# Patient Record
Sex: FEMALE | Race: WHITE | NOT HISPANIC OR LATINO | Employment: OTHER | URBAN - METROPOLITAN AREA
[De-identification: names, ages, dates, MRNs, and addresses within clinical notes are randomized per-mention and may not be internally consistent; named-entity substitution may affect disease eponyms.]

---

## 2017-02-15 ENCOUNTER — LAB VISIT (OUTPATIENT)
Dept: LAB | Facility: HOSPITAL | Age: 47
End: 2017-02-15
Attending: SURGERY
Payer: MEDICARE

## 2017-02-15 DIAGNOSIS — E46 PROTEIN CALORIE MALNUTRITION: ICD-10-CM

## 2017-02-15 DIAGNOSIS — E66.01 MORBID OBESITY DUE TO EXCESS CALORIES: ICD-10-CM

## 2017-02-15 LAB
ALBUMIN SERPL BCP-MCNC: 3.6 G/DL
ALP SERPL-CCNC: 91 U/L
ALT SERPL W/O P-5'-P-CCNC: 11 U/L
ANION GAP SERPL CALC-SCNC: 9 MMOL/L
AST SERPL-CCNC: 15 U/L
BASOPHILS # BLD AUTO: 0 K/UL
BASOPHILS NFR BLD: 0.5 %
BILIRUB SERPL-MCNC: 0.3 MG/DL
BUN SERPL-MCNC: 10 MG/DL
CALCIUM SERPL-MCNC: 9.2 MG/DL
CHLORIDE SERPL-SCNC: 105 MMOL/L
CHOLEST/HDLC SERPL: 4.5 {RATIO}
CO2 SERPL-SCNC: 27 MMOL/L
CREAT SERPL-MCNC: 0.7 MG/DL
DIFFERENTIAL METHOD: ABNORMAL
EOSINOPHIL # BLD AUTO: 0.1 K/UL
EOSINOPHIL NFR BLD: 1.7 %
ERYTHROCYTE [DISTWIDTH] IN BLOOD BY AUTOMATED COUNT: 18 %
EST. GFR  (AFRICAN AMERICAN): >60 ML/MIN/1.73 M^2
EST. GFR  (NON AFRICAN AMERICAN): >60 ML/MIN/1.73 M^2
GLUCOSE SERPL-MCNC: 68 MG/DL
HCT VFR BLD AUTO: 33.2 %
HDL/CHOLESTEROL RATIO: 22.1 %
HDLC SERPL-MCNC: 244 MG/DL
HDLC SERPL-MCNC: 54 MG/DL
HGB BLD-MCNC: 10.6 G/DL
IRON SERPL-MCNC: 39 UG/DL
LDLC SERPL CALC-MCNC: 168.2 MG/DL
LYMPHOCYTES # BLD AUTO: 1.8 K/UL
LYMPHOCYTES NFR BLD: 37.5 %
MCH RBC QN AUTO: 27.1 PG
MCHC RBC AUTO-ENTMCNC: 31.9 %
MCV RBC AUTO: 85 FL
MONOCYTES # BLD AUTO: 0.3 K/UL
MONOCYTES NFR BLD: 7 %
NEUTROPHILS # BLD AUTO: 2.5 K/UL
NEUTROPHILS NFR BLD: 53.3 %
NONHDLC SERPL-MCNC: 190 MG/DL
PLATELET # BLD AUTO: 308 K/UL
PMV BLD AUTO: 8.9 FL
POTASSIUM SERPL-SCNC: 3.9 MMOL/L
PROT SERPL-MCNC: 7.1 G/DL
RBC # BLD AUTO: 3.91 M/UL
SATURATED IRON: 8 %
SODIUM SERPL-SCNC: 141 MMOL/L
TOTAL IRON BINDING CAPACITY: 465 UG/DL
TRANSFERRIN SERPL-MCNC: 314 MG/DL
TRIGL SERPL-MCNC: 109 MG/DL
VIT B12 SERPL-MCNC: 357 PG/ML
WBC # BLD AUTO: 4.7 K/UL

## 2017-02-15 PROCEDURE — 36415 COLL VENOUS BLD VENIPUNCTURE: CPT

## 2017-02-15 PROCEDURE — 85025 COMPLETE CBC W/AUTO DIFF WBC: CPT

## 2017-02-15 PROCEDURE — 84425 ASSAY OF VITAMIN B-1: CPT

## 2017-02-15 PROCEDURE — 83540 ASSAY OF IRON: CPT

## 2017-02-15 PROCEDURE — 80061 LIPID PANEL: CPT

## 2017-02-15 PROCEDURE — 80053 COMPREHEN METABOLIC PANEL: CPT

## 2017-02-15 PROCEDURE — 82607 VITAMIN B-12: CPT

## 2017-02-16 ENCOUNTER — OFFICE VISIT (OUTPATIENT)
Dept: BARIATRICS | Facility: CLINIC | Age: 47
End: 2017-02-16
Payer: MEDICARE

## 2017-02-16 VITALS
HEIGHT: 65 IN | HEART RATE: 63 BPM | DIASTOLIC BLOOD PRESSURE: 86 MMHG | RESPIRATION RATE: 16 BRPM | BODY MASS INDEX: 31.52 KG/M2 | TEMPERATURE: 98 F | WEIGHT: 189.19 LBS | SYSTOLIC BLOOD PRESSURE: 131 MMHG

## 2017-02-16 DIAGNOSIS — E55.9 VITAMIN D DEFICIENCY: ICD-10-CM

## 2017-02-16 DIAGNOSIS — E78.5 HYPERLIPIDEMIA, UNSPECIFIED HYPERLIPIDEMIA TYPE: ICD-10-CM

## 2017-02-16 DIAGNOSIS — E46 PROTEIN CALORIE MALNUTRITION: ICD-10-CM

## 2017-02-16 DIAGNOSIS — E11.9 TYPE 2 DIABETES MELLITUS WITHOUT COMPLICATION, WITHOUT LONG-TERM CURRENT USE OF INSULIN: ICD-10-CM

## 2017-02-16 DIAGNOSIS — E66.01 MORBID OBESITY, UNSPECIFIED OBESITY TYPE: Primary | ICD-10-CM

## 2017-02-16 DIAGNOSIS — E66.01 MORBID OBESITY WITH BMI OF 40.0-44.9, ADULT: ICD-10-CM

## 2017-02-16 PROCEDURE — 3044F HG A1C LEVEL LT 7.0%: CPT | Mod: S$GLB,,, | Performed by: SURGERY

## 2017-02-16 PROCEDURE — 99213 OFFICE O/P EST LOW 20 MIN: CPT | Mod: S$GLB,,, | Performed by: SURGERY

## 2017-02-16 PROCEDURE — 3079F DIAST BP 80-89 MM HG: CPT | Mod: S$GLB,,, | Performed by: SURGERY

## 2017-02-16 PROCEDURE — 99999 PR PBB SHADOW E&M-EST. PATIENT-LVL III: CPT | Mod: PBBFAC,,, | Performed by: SURGERY

## 2017-02-16 PROCEDURE — 3075F SYST BP GE 130 - 139MM HG: CPT | Mod: S$GLB,,, | Performed by: SURGERY

## 2017-02-16 PROCEDURE — 2022F DILAT RTA XM EVC RTNOPTHY: CPT | Mod: S$GLB,,, | Performed by: SURGERY

## 2017-02-16 PROCEDURE — 3060F POS MICROALBUMINURIA REV: CPT | Mod: S$GLB,,, | Performed by: SURGERY

## 2017-02-16 RX ORDER — MULTIVITAMIN
1 TABLET ORAL DAILY
COMMUNITY

## 2017-02-16 RX ORDER — SIMVASTATIN 20 MG/1
20 TABLET, FILM COATED ORAL NIGHTLY
Qty: 90 TABLET | Refills: 3 | Status: SHIPPED | OUTPATIENT
Start: 2017-02-16 | End: 2017-11-07 | Stop reason: SDUPTHER

## 2017-02-16 RX ORDER — FERROUS SULFATE 325(65) MG
325 TABLET ORAL DAILY
Qty: 90 TABLET | Refills: 0 | Status: SHIPPED | OUTPATIENT
Start: 2017-02-16 | End: 2018-10-09

## 2017-02-16 NOTE — MR AVS SNAPSHOT
[facility]  [address]  [address]  [place] [address]  Phone: [phone]  Fax: [phone]                  Bessie Elliott   2017 8:15 AM   Office Visit    Description:  Female : 1970   Provider:  Peggy Jo MD   Department:  [place] [facility]           Reason for Visit     Obesity           Diagnoses this Visit        Comments    Morbid obesity, unspecified obesity type    -  Primary     Morbid obesity with BMI of 40.0-44.9, adult         Type 2 diabetes mellitus without complication, without long-term current use of insulin         Hyperlipidemia, unspecified hyperlipidemia type         Protein calorie malnutrition         Vitamin D deficiency                To Do List           Future Appointments        Provider Department Dept Phone    2017 8:20 AM LAB, [facility] [phone]    2017 9:15 AM MD [facility] [phone]      Goals (5 Years of Data)     None      Follow-Up and Disposition     Return in about 3 months (around 2017).    Follow-up and Disposition History       These Medications        Disp Refills Start End    simvastatin (ZOCOR) 20 MG tablet 90 tablet 3 2017    Take 1 tablet (20 mg total) by mouth every evening. - Oral    Pharmacy: [facility], [address] Ph #: [phone]       ferrous sulfate 325 mg (65 mg iron) Tab tablet 90 tablet 0 2017     Take 1 tablet (325 mg total) by mouth once daily. - Oral    Pharmacy: [facility], [address] Ph #: [phone]         [facility]     [facility] -  Assistance  Registered nurses in the [facility] provide clinical advisement, health education, appointment booking, and other advisory services.  Call for this free service at [phone].            "  Medications           Message regarding Medications     Verify the changes and/or additions to your medication regime listed below are the same as discussed with your clinician today.  If any of these changes or additions are incorrect, please notify your healthcare provider.        START taking these NEW medications        Refills    simvastatin (ZOCOR) 20 MG tablet 3    Sig: Take 1 tablet (20 mg total) by mouth every evening.    Class: Normal    Route: Oral    ferrous sulfate 325 mg (65 mg iron) Tab tablet 0    Sig: Take 1 tablet (325 mg total) by mouth once daily.    Class: Normal    Route: Oral      STOP taking these medications     metronidazole (FLAGYL) 500 MG tablet TAKE ONE TABLET BY MOUTH EVERY 6 HOURS FOR 10 DAYS           Verify that the below list of medications is an accurate representation of the medications you are currently taking.  If none reported, the list may be blank. If incorrect, please contact your healthcare provider. Carry this list with you in case of emergency.           Current Medications     ALBUTEROL SULFATE (VENTOLIN HFA INHL) Inhale into the lungs.    alprazolam (XANAX) 1 MG tablet     aripiprazole (ABILIFY) 20 MG Tab Take 5 mg by mouth once daily.    BD INSULIN PEN NEEDLE UF MINI 31 gauge x 3/16" Ndle     dexlansoprazole (DEXILANT) 60 mg capsule Take 60 mg by mouth once daily.    lamotrigine (LAMICTAL) 150 MG Tab Take 300 mg by mouth every evening.     methocarbamol (ROBAXIN) 500 MG Tab     multivitamin (THERAGRAN) per tablet Take 1 tablet by mouth once daily.    ondansetron (ZOFRAN-ODT) 4 MG TbDL Take 1 tablet (4 mg total) by mouth every 6 (six) hours as needed.    promethazine (PHENERGAN) 25 MG tablet Take 25 mg by mouth every 4 (four) hours.    sertraline (ZOLOFT) 100 MG tablet Take 150 mg by mouth every evening.     sucralfate (CARAFATE) 100 mg/mL suspension Take 10 mLs (1 g total) by mouth 4 (four) times daily.    VICTOZA 3-JOHN 0.6 mg/0.1 mL (18 mg/3 mL) PnIj     zolpidem " "(AMBIEN) 10 mg Tab Take 5 mg by mouth nightly as needed.    ferrous sulfate 325 mg (65 mg iron) Tab tablet Take 1 tablet (325 mg total) by mouth once daily.    simvastatin (ZOCOR) 20 MG tablet Take 1 tablet (20 mg total) by mouth every evening.    ursodiol (ACTIGALL) 300 mg capsule            Clinical Reference Information           Your Vitals Were     BP Pulse Temp Resp Height Weight    131/86 63 98 °F (36.7 °C) (Oral) 16 5' 5" (1.651 m) 85.8 kg (189 lb 3.2 oz)    BMI                31.48 kg/m2          Blood Pressure          Most Recent Value    BP  131/86      Allergies as of 2/16/2017     Ace Inhibitors      Immunizations Administered on Date of Encounter - 2/16/2017     None      Orders Placed During Today's Visit     Future Labs/Procedures Expected by Expires    B12  2/16/2017 4/17/2018    B1  2/16/2017 4/17/2018    CBC w/ Auto Differential  2/16/2017 4/17/2018    CMP  2/16/2017 4/17/2018    Iron Studies  2/16/2017 4/17/2018    Lipid Panel  2/16/2017 4/17/2018    Vitamin D 25 Hydroxy  2/16/2017 4/17/2018      Language Assistance Services     ATTENTION: Language assistance services are available, free of charge. Please call [phone].      ATENCIÓN: Si habla jacob, tiene a zhong disposición servicios gratuitos de asistencia lingüística. Llame al [phone].     CHÚ Ý: N?u b?n nói Ti?ng Vi?t, có các d?ch v? h? tr? ngôn ng? mi?n phí dành cho b?n. G?i s? [phone].         [facility] complies with applicable Federal civil rights laws and does not discriminate on the basis of race, color, national origin, age, disability, or sex.        "

## 2017-02-16 NOTE — PROGRESS NOTES
Post Op Note    Surgery: gastric bypass surgery  Date: 5/16/16  Initial weight: 248  Last weight: 199  Current weight: 189    Constipation: none  Reflux: having some heartburn  Vomiting: milk and hot dogs cause vomiting    Diet:  Breakfast: skip sometimes, egg boiled or scrambled  Drinking plenty of water  Lunch: protein shake or deli meat  Dinner: baked or broiled meat with mixed vegetables  Snack: piece of cake once  Protein shake: permier    Still staying away from sodas    Exercise:  Bought a bicycle to use but limited by knee    MVI: bariatric vitamin, 1 mvi, calcium, b12    Vitals:    02/16/17 0825   BP: 131/86   Pulse: 63   Resp: 16   Temp: 98 °F (36.7 °C)       Body comp:  Fat Percent:  43.1 %  Fat Mass:  81.6 lb  FFM:  107.6 lb  TBW: 77 lb  TBW %:  40.7 %  BMR: 1506 kcal    PE:  NAD  RRR  Soft/nt/nd  Incisions: well healed    Labs: high cholesterol    A/P: s/p gastric bypass    Go back to see Dr. Clarissa German for ongoing knee pain after replacement  Restart cholesterol medicine   Restart iron    Counseling for patient:    Diet: avoid sodas still; stay away from carbs  Exercise: start exercising 20 minutes 3 x week  Vitamins: continue regimen - add iron    Co morbidities:     1. Morbid obesity, unspecified obesity type  CBC w/ Auto Differential    CMP    B1    Lipid Panel   2. Morbid obesity with BMI of 40.0-44.9, adult     3. Type 2 diabetes mellitus without complication, without long-term current use of insulin     4. Hyperlipidemia, unspecified hyperlipidemia type     5. Protein calorie malnutrition  B12    Iron Studies   6. Vitamin D deficiency  Vitamin D 25 Hydroxy       : I met with the patient for 15 minutes and counseled her for over 50% of that time

## 2017-02-17 LAB — VIT B1 SERPL-MCNC: 40 UG/L (ref 38–122)

## 2017-04-06 ENCOUNTER — PATIENT MESSAGE (OUTPATIENT)
Dept: SURGERY | Facility: CLINIC | Age: 47
End: 2017-04-06

## 2017-05-16 ENCOUNTER — OFFICE VISIT (OUTPATIENT)
Dept: BARIATRICS | Facility: CLINIC | Age: 47
End: 2017-05-16
Payer: MEDICARE

## 2017-05-16 VITALS
DIASTOLIC BLOOD PRESSURE: 71 MMHG | WEIGHT: 168 LBS | BODY MASS INDEX: 27.99 KG/M2 | SYSTOLIC BLOOD PRESSURE: 123 MMHG | RESPIRATION RATE: 16 BRPM | HEART RATE: 71 BPM | HEIGHT: 65 IN | TEMPERATURE: 98 F

## 2017-05-16 DIAGNOSIS — E78.5 HYPERLIPIDEMIA, UNSPECIFIED HYPERLIPIDEMIA TYPE: ICD-10-CM

## 2017-05-16 DIAGNOSIS — E11.9 TYPE 2 DIABETES MELLITUS WITHOUT COMPLICATION, WITHOUT LONG-TERM CURRENT USE OF INSULIN: ICD-10-CM

## 2017-05-16 DIAGNOSIS — E55.9 VITAMIN D DEFICIENCY: ICD-10-CM

## 2017-05-16 DIAGNOSIS — E53.8 B12 DEFICIENCY: ICD-10-CM

## 2017-05-16 DIAGNOSIS — E53.8 FOLATE DEFICIENCY: ICD-10-CM

## 2017-05-16 DIAGNOSIS — E51.9 THIAMINE DEFICIENCY: ICD-10-CM

## 2017-05-16 DIAGNOSIS — E66.01 MORBID OBESITY, UNSPECIFIED OBESITY TYPE: Primary | ICD-10-CM

## 2017-05-16 DIAGNOSIS — L98.7 LOOSE SKIN: ICD-10-CM

## 2017-05-16 DIAGNOSIS — E66.01 MORBID OBESITY WITH BMI OF 40.0-44.9, ADULT: ICD-10-CM

## 2017-05-16 DIAGNOSIS — E61.1 IRON DEFICIENCY: ICD-10-CM

## 2017-05-16 PROCEDURE — 99213 OFFICE O/P EST LOW 20 MIN: CPT | Mod: S$GLB,,, | Performed by: SURGERY

## 2017-05-16 PROCEDURE — 99999 PR PBB SHADOW E&M-EST. PATIENT-LVL III: CPT | Mod: PBBFAC,,, | Performed by: SURGERY

## 2017-05-16 PROCEDURE — 3044F HG A1C LEVEL LT 7.0%: CPT | Mod: S$GLB,,, | Performed by: SURGERY

## 2017-05-16 PROCEDURE — 3074F SYST BP LT 130 MM HG: CPT | Mod: S$GLB,,, | Performed by: SURGERY

## 2017-05-16 PROCEDURE — 3060F POS MICROALBUMINURIA REV: CPT | Mod: 8P,S$GLB,, | Performed by: SURGERY

## 2017-05-16 PROCEDURE — 3078F DIAST BP <80 MM HG: CPT | Mod: S$GLB,,, | Performed by: SURGERY

## 2017-05-16 PROCEDURE — 1160F RVW MEDS BY RX/DR IN RCRD: CPT | Mod: S$GLB,,, | Performed by: SURGERY

## 2017-05-16 RX ORDER — ALBUTEROL SULFATE 0.83 MG/ML
2.5 SOLUTION RESPIRATORY (INHALATION) EVERY 6 HOURS PRN
COMMUNITY

## 2017-05-16 RX ORDER — SUMATRIPTAN SUCCINATE 100 MG/1
TABLET ORAL
Refills: 6 | COMMUNITY
Start: 2017-04-03 | End: 2020-12-10 | Stop reason: RX

## 2017-05-16 RX ORDER — HYDROCODONE BITARTRATE AND ACETAMINOPHEN 10; 325 MG/1; MG/1
1 TABLET ORAL 2 TIMES DAILY PRN
Refills: 0 | Status: ON HOLD | COMMUNITY
Start: 2017-05-03 | End: 2017-08-16

## 2017-05-16 RX ORDER — EPINASTINE HYDROCHLORIDE 0.5 MG/ML
1 SOLUTION/ DROPS OPHTHALMIC 2 TIMES DAILY
Refills: 99 | COMMUNITY
Start: 2017-04-03

## 2017-05-16 RX ORDER — TOPIRAMATE 200 MG/1
300 TABLET ORAL 2 TIMES DAILY
COMMUNITY
Start: 2015-09-29 | End: 2020-12-10

## 2017-05-16 RX ORDER — FLUTICASONE PROPIONATE AND SALMETEROL 250; 50 UG/1; UG/1
1 POWDER RESPIRATORY (INHALATION) EVERY MORNING
COMMUNITY
End: 2020-12-10

## 2017-05-16 NOTE — PROGRESS NOTES
Post Op Note    Surgery: gastric bypass surgery  Date: 5/16/16  Initial weight: 248  Last weight: 189  Current weight: 168    Constipation: stool softener, stopped iron pills  Reflux: none  Vomiting: none    Diet:  Breakfast:  Protein shake  Lunch: may do another protein shake, sometimes scrambled egg  Dinner: steamed vegetables with a small meat- broiled hamburger alex  Snack: none  Protein shake: premier    Exercise:  Walking 2-3 times per week 15 minutes  Active throughout day, mows lawn, walking stairs    MVI: 3 mvi daily, b12, calcium with vitamin D, stopped iron pill    Vitals:    05/16/17 0902   BP: 123/71   Pulse: 71   Resp: 16   Temp: 97.8 °F (36.6 °C)       Body comp:  Fat Percent:  39.3 %  Fat Mass:  66 lb  FFM:  102 lb  TBW: 72 lb  TBW %:  42.9 %  BMR: 1413 kcal    PE:  NAD  RRR  Soft/nt/nd  Incisions: well healed    Labs: none    A/P: s/p kam en y gastric bypass     Counseling for patient:    Diet: We talked about ok for healthy eating which includes healthy carbs like bread and some vegetables not on our list.  Still avoid high sugar food products, soda should be a never as well as other sweets avoid pretzels.  Exercise: keep up your exercise routine, as time progresses start increasing duration, frequency, or intensity   Vitamins: will wait on labs to see if we can decrease multivitamin    Co morbidities:     1. Morbid obesity, unspecified obesity type  BMP    CBC w/ Auto Differential    Hepatic Panel    Magnesium    Phosphorus   2. Morbid obesity with BMI of 40.0-44.9, adult     3. Type 2 diabetes mellitus without complication, without long-term current use of insulin  Hg A1c   4. Hyperlipidemia, unspecified hyperlipidemia type  Lipid Profile   5. Vitamin D deficiency  Vitamin D 25 Hydroxy   6. B12 deficiency  Vitamin B12   7. Thiamine deficiency  Vitamin B1   8. Folate deficiency  Folate Serum   9. Loose skin  Ambulatory Referral to Plastic Surgery       : I met with the patient for 15 minutes  and counseled her for over 50% of that time

## 2017-05-16 NOTE — MR AVS SNAPSHOT
[facility]   [address], [address]  [place] [address]  Phone: [phone]  Fax: [phone]                  Bessie Elliott   2017 9:15 AM   Office Visit    Description:  Female : 1970   Provider:  Peggy Jo MD   Department:  [facility]           Reason for Visit     Follow-up           Diagnoses this Visit        Comments    Morbid obesity, unspecified obesity type    -  Primary     Morbid obesity with BMI of 40.0-44.9, adult         Type 2 diabetes mellitus without complication, without long-term current use of insulin         Hyperlipidemia, unspecified hyperlipidemia type         Vitamin D deficiency         B12 deficiency         Thiamine deficiency         Folate deficiency         Loose skin         Iron deficiency                To Do List           Goals (5 Years of Data)     None      Follow-Up and Disposition     Return in about 1 year (around 2018).    Follow-up and Disposition History      [facility]     [facility] Nurse Care Line -  Assistance  Unless otherwise directed by your provider, please contact [facility], our nurse care line that is available for  assistance.     Registered nurses in the [facility] provide: appointment scheduling, clinical advisement, health education, and other advisory services.  Call: [phone] (toll free)               Medications           Message regarding Medications     Verify the changes and/or additions to your medication regime listed below are the same as discussed with your clinician today.  If any of these changes or additions are incorrect, please notify your healthcare provider.             Verify that the below list of medications is an accurate representation of the medications you are currently taking.  If none reported, the list may be blank. If incorrect, please contact your healthcare provider. Carry this list with you in case of emergency.     "       Current Medications     alprazolam (XANAX) 1 MG tablet     aripiprazole (ABILIFY) 20 MG Tab Take 5 mg by mouth once daily.    BD INSULIN PEN NEEDLE UF MINI 31 gauge x 3/16" Ndle     dexlansoprazole (DEXILANT) 60 mg capsule Take 60 mg by mouth once daily.    epinastine 0.05 % ophthalmic solution Place 1 drop into both eyes 2 (two) times daily.    ferrous sulfate 325 mg (65 mg iron) Tab tablet Take 1 tablet (325 mg total) by mouth once daily.    hydrocodone-acetaminophen 10-325mg (NORCO)  mg Tab Take 1 tablet by mouth 2 (two) times daily as needed.    lamotrigine (LAMICTAL) 150 MG Tab Take 300 mg by mouth every evening.     methocarbamol (ROBAXIN) 500 MG Tab     multivitamin (THERAGRAN) per tablet Take 1 tablet by mouth once daily.    sertraline (ZOLOFT) 100 MG tablet Take 150 mg by mouth every evening.     simvastatin (ZOCOR) 20 MG tablet Take 1 tablet (20 mg total) by mouth every evening.    sucralfate (CARAFATE) 100 mg/mL suspension Take 10 mLs (1 g total) by mouth 4 (four) times daily.    topiramate (TOPAMAX) 200 MG Tab Take 200 mg by mouth.    VICTOZA 3-JOHN 0.6 mg/0.1 mL (18 mg/3 mL) PnIj     zolpidem (AMBIEN) 10 mg Tab Take 5 mg by mouth nightly as needed.    albuterol (PROVENTIL) 2.5 mg /3 mL (0.083 %) nebulizer solution 2.5 mg.    ALBUTEROL SULFATE (VENTOLIN HFA INHL) Inhale into the lungs.    fluticasone-salmeterol 250-50 mcg/dose (ADVAIR) 250-50 mcg/dose diskus inhaler Inhale 1 puff into the lungs.    ondansetron (ZOFRAN-ODT) 4 MG TbDL Take 1 tablet (4 mg total) by mouth every 6 (six) hours as needed.    promethazine (PHENERGAN) 25 MG tablet Take 25 mg by mouth every 4 (four) hours.    sumatriptan (IMITREX) 100 MG tablet TAKE ONE TABLET BY MOUTH EVERY DAY AS DIRECTED. may REPEAT one dose in 2 HOURS if no relief. may start with ONE-HALF TABLET    ursodiol (ACTIGALL) 300 mg capsule            Clinical Reference Information           Your Vitals Were     BP Pulse Temp Resp Height Weight    123/71 " "71 97.8 °F (36.6 °C) (Oral) 16 5' 5" (1.651 m) 76.2 kg (168 lb)    BMI                27.96 kg/m2          Blood Pressure          Most Recent Value    BP  123/71      Allergies as of 5/16/2017     Ace Inhibitors      Immunizations Administered on Date of Encounter - 5/16/2017     None      Orders Placed During Today's Visit      Normal Orders This Visit    Ambulatory Referral to Plastic Surgery     Future Labs/Procedures Expected by Expires    BMP  5/16/2017 7/15/2018    CBC w/ Auto Differential  5/16/2017 7/15/2018    Folate Serum  5/16/2017 7/15/2018    Hepatic Panel  5/16/2017 7/15/2018    Hg A1c  5/16/2017 7/15/2018    Iron & TIBC  5/16/2017 7/15/2018    Lipid Profile  5/16/2017 7/15/2018    Magnesium  5/16/2017 7/15/2018    Phosphorus  5/16/2017 7/15/2018    Vitamin B12  5/16/2017 7/15/2018    Vitamin B1  5/16/2017 7/15/2018    Vitamin D 25 Hydroxy  5/16/2017 7/15/2018      Language Assistance Services     ATTENTION: Language assistance services are available, free of charge. Please call [phone].      ATENCIÓN: Si habla jeffañol, tiene a zhong disposición servicios gratuitos de asistencia lingüística. Llame al [phone].     CHÚ Ý: N?u b?n nói Ti?ng Vi?t, có các d?ch v? h? tr? ngôn ng? mi?n phí dành cho b?n. G?i s? [phone].         [facility] complies with applicable Federal civil rights laws and does not discriminate on the basis of race, color, national origin, age, disability, or sex.        "

## 2017-07-27 ENCOUNTER — HOSPITAL ENCOUNTER (EMERGENCY)
Facility: HOSPITAL | Age: 47
Discharge: HOME | End: 2017-07-27
Attending: EMERGENCY MEDICINE
Payer: MEDICARE

## 2017-07-27 VITALS
OXYGEN SATURATION: 100 % | WEIGHT: 160 LBS | HEART RATE: 61 BPM | RESPIRATION RATE: 16 BRPM | BODY MASS INDEX: 26.63 KG/M2 | DIASTOLIC BLOOD PRESSURE: 70 MMHG | TEMPERATURE: 97 F | SYSTOLIC BLOOD PRESSURE: 129 MMHG

## 2017-07-27 DIAGNOSIS — R53.83 FATIGUE, UNSPECIFIED TYPE: Primary | ICD-10-CM

## 2017-07-27 LAB
ALBUMIN SERPL BCP-MCNC: 3.7 G/DL
ALP SERPL-CCNC: 96 U/L
ALT SERPL W/O P-5'-P-CCNC: 11 U/L
ANION GAP SERPL CALC-SCNC: 9 MMOL/L
AST SERPL-CCNC: 14 U/L
BASOPHILS # BLD AUTO: 0 K/UL
BASOPHILS NFR BLD: 0.6 %
BILIRUB SERPL-MCNC: 0.3 MG/DL
BUN SERPL-MCNC: 14 MG/DL
CALCIUM SERPL-MCNC: 8.8 MG/DL
CHLORIDE SERPL-SCNC: 109 MMOL/L
CO2 SERPL-SCNC: 21 MMOL/L
CREAT SERPL-MCNC: 0.9 MG/DL
DIFFERENTIAL METHOD: ABNORMAL
EOSINOPHIL # BLD AUTO: 0.1 K/UL
EOSINOPHIL NFR BLD: 1.1 %
ERYTHROCYTE [DISTWIDTH] IN BLOOD BY AUTOMATED COUNT: 18.1 %
EST. GFR  (AFRICAN AMERICAN): >60 ML/MIN/1.73 M^2
EST. GFR  (NON AFRICAN AMERICAN): >60 ML/MIN/1.73 M^2
GLUCOSE SERPL-MCNC: 86 MG/DL
HCT VFR BLD AUTO: 32.8 %
HGB BLD-MCNC: 10.6 G/DL
LYMPHOCYTES # BLD AUTO: 2.3 K/UL
LYMPHOCYTES NFR BLD: 37 %
MAGNESIUM SERPL-MCNC: 2.2 MG/DL
MCH RBC QN AUTO: 27.6 PG
MCHC RBC AUTO-ENTMCNC: 32.4 G/DL
MCV RBC AUTO: 85 FL
MONOCYTES # BLD AUTO: 0.5 K/UL
MONOCYTES NFR BLD: 8.7 %
NEUTROPHILS # BLD AUTO: 3.2 K/UL
NEUTROPHILS NFR BLD: 52.6 %
PHOSPHATE SERPL-MCNC: 4 MG/DL
PLATELET # BLD AUTO: 260 K/UL
PMV BLD AUTO: 8.3 FL
POTASSIUM SERPL-SCNC: 3.4 MMOL/L
PROT SERPL-MCNC: 7.1 G/DL
RBC # BLD AUTO: 3.85 M/UL
SODIUM SERPL-SCNC: 139 MMOL/L
TSH SERPL DL<=0.005 MIU/L-ACNC: 1.97 UIU/ML
WBC # BLD AUTO: 6.1 K/UL

## 2017-07-27 PROCEDURE — 36415 COLL VENOUS BLD VENIPUNCTURE: CPT

## 2017-07-27 PROCEDURE — 80053 COMPREHEN METABOLIC PANEL: CPT

## 2017-07-27 PROCEDURE — 83735 ASSAY OF MAGNESIUM: CPT

## 2017-07-27 PROCEDURE — 85025 COMPLETE CBC W/AUTO DIFF WBC: CPT

## 2017-07-27 PROCEDURE — 99284 EMERGENCY DEPT VISIT MOD MDM: CPT

## 2017-07-27 PROCEDURE — 84100 ASSAY OF PHOSPHORUS: CPT

## 2017-07-27 PROCEDURE — 84443 ASSAY THYROID STIM HORMONE: CPT

## 2017-07-27 PROCEDURE — 93005 ELECTROCARDIOGRAM TRACING: CPT

## 2017-07-27 PROCEDURE — 25000003 PHARM REV CODE 250: Performed by: EMERGENCY MEDICINE

## 2017-07-27 RX ORDER — POTASSIUM CHLORIDE 20 MEQ/1
20 TABLET, EXTENDED RELEASE ORAL
Status: COMPLETED | OUTPATIENT
Start: 2017-07-27 | End: 2017-07-27

## 2017-07-27 RX ORDER — ACETAMINOPHEN 325 MG/1
650 TABLET ORAL
Status: COMPLETED | OUTPATIENT
Start: 2017-07-27 | End: 2017-07-27

## 2017-07-27 RX ADMIN — POTASSIUM CHLORIDE 20 MEQ: 1500 TABLET, EXTENDED RELEASE ORAL at 10:07

## 2017-07-27 RX ADMIN — ACETAMINOPHEN 650 MG: 325 TABLET, FILM COATED ORAL at 09:07

## 2017-07-27 NOTE — ED PROVIDER NOTES
"Chief complaint:  Fatigue (cant stay awake)       HPI:  Bessie Elliott is a 46 y.o. female with hx of GBP, prior hysterectomy, anemia presenting with feeling of fatigue.  The patient has a history of chronic, unchanged migraine headaches.  She denies confusion, visual changes, numbness, weakness.  She has been compliant with diet.  The patient simply states that she easily falls asleep during the day despite adequate sleep at night.    ROS: As per HPI and below:  Positive for chronic, unchanged headaches.  No visual changes, numbness, weakness, chest pain, syncope, dyspnea him a cough, rhinorrhea, urinary complaints such as dysuria, oliguria, fever, abdominal pain, vomiting, diarrhea, blood in the stools, rashes, swelling.    Review of patient's allergies indicates:   Allergen Reactions    Ace inhibitors Other (See Comments)     Cough       Discharge Medication List as of 7/27/2017 10:05 AM      CONTINUE these medications which have NOT CHANGED    Details   albuterol (PROVENTIL) 2.5 mg /3 mL (0.083 %) nebulizer solution 2.5 mg., Until Discontinued, Historical Med      ALBUTEROL SULFATE (VENTOLIN HFA INHL) Inhale into the lungs., Until Discontinued, Historical Med      alprazolam (XANAX) 1 MG tablet Starting 5/25/2016, Until Discontinued, Historical Med      aripiprazole (ABILIFY) 20 MG Tab Take 5 mg by mouth once daily., Until Discontinued, Historical Med      BD INSULIN PEN NEEDLE UF MINI 31 gauge x 3/16" Ndle Starting 2/10/2016, Until Discontinued, Historical Med      dexlansoprazole (DEXILANT) 60 mg capsule Take 60 mg by mouth once daily., Until Discontinued, Historical Med      epinastine 0.05 % ophthalmic solution Place 1 drop into both eyes 2 (two) times daily., Starting 4/3/2017, Until Discontinued, Historical Med      ferrous sulfate 325 mg (65 mg iron) Tab tablet Take 1 tablet (325 mg total) by mouth once daily., Starting 2/16/2017, Until Discontinued, Normal      fluticasone-salmeterol 250-50 mcg/dose " (ADVAIR) 250-50 mcg/dose diskus inhaler Inhale 1 puff into the lungs., Until Discontinued, Historical Med      hydrocodone-acetaminophen 10-325mg (NORCO)  mg Tab Take 1 tablet by mouth 2 (two) times daily as needed., Starting 5/3/2017, Until Discontinued, Historical Med      lamotrigine (LAMICTAL) 150 MG Tab Take 300 mg by mouth every evening. , Starting 3/28/2016, Until Discontinued, Historical Med      methocarbamol (ROBAXIN) 500 MG Tab Starting 11/3/2016, Until Discontinued, Historical Med      multivitamin (THERAGRAN) per tablet Take 1 tablet by mouth once daily., Until Discontinued, Historical Med      ondansetron (ZOFRAN-ODT) 4 MG TbDL Take 1 tablet (4 mg total) by mouth every 6 (six) hours as needed., Starting 12/8/2016, Until Discontinued, Normal      promethazine (PHENERGAN) 25 MG tablet Take 25 mg by mouth every 4 (four) hours., Until Discontinued, Historical Med      sertraline (ZOLOFT) 100 MG tablet Take 150 mg by mouth every evening. , Until Discontinued, Historical Med      simvastatin (ZOCOR) 20 MG tablet Take 1 tablet (20 mg total) by mouth every evening., Starting 2/16/2017, Until Fri 2/16/18, Normal      sucralfate (CARAFATE) 100 mg/mL suspension Take 10 mLs (1 g total) by mouth 4 (four) times daily., Starting 11/21/2016, Until Discontinued, Normal      sumatriptan (IMITREX) 100 MG tablet TAKE ONE TABLET BY MOUTH EVERY DAY AS DIRECTED. may REPEAT one dose in 2 HOURS if no relief. may start with ONE-HALF TABLET, Historical Med      topiramate (TOPAMAX) 200 MG Tab Take 200 mg by mouth., Starting 9/29/2015, Until Discontinued, Historical Med      ursodiol (ACTIGALL) 300 mg capsule Starting 5/18/2016, Until Discontinued, Historical Med      VICTOZA 3-JOHN 0.6 mg/0.1 mL (18 mg/3 mL) PnIj Starting 11/3/2016, Until Discontinued, Historical Med      zolpidem (AMBIEN) 10 mg Tab Take 5 mg by mouth nightly as needed., Until Discontinued, Historical Med             PMH:  As per HPI and below:  Past  Medical History:   Diagnosis Date    Arthritis     bilateral knees and hands    Asthma     Bipolar 1 disorder     Diabetes mellitus     Hiatal hernia     HLD (hyperlipidemia)     S/P gastric bypass 2016    by     Shoulder injury     left shoulder rotator cuff repair     Past Surgical History:   Procedure Laterality Date     SECTION      GASTRIC BYPASS  2016        HYSTERECTOMY      JOINT REPLACEMENT      bilateral knees    ROTATOR CUFF REPAIR Left 2016    TOTAL KNEE ARTHROPLASTY Bilateral        Social History     Social History    Marital status:      Spouse name: N/A    Number of children: N/A    Years of education: N/A     Social History Main Topics    Smoking status: Never Smoker    Smokeless tobacco: Never Used    Alcohol use No    Drug use: No    Sexual activity: Not Asked     Other Topics Concern    None     Social History Narrative    None       Family History   Problem Relation Age of Onset    Diabetes Mother     Hyperlipidemia Mother     Hypertension Mother     Heart disease Father     Hypertension Brother        Physical Exam:    Vitals:    17 1018   BP: 129/70   Pulse: 61   Resp: 16   Temp:      GENERAL:  No apparent distress.  Alert.    HEENT:  Moist mucous membranes.  Normocephalic and atraumatic.  Normal-appearing conjunctiva.  NECK:  No swelling.  Midline trachea.  Supple.  CARDIOVASCULAR:  Regular rate and rhythm.  2+ radial pulses.  No murmurs auscultated.  PULMONARY:  Lungs clear to auscultation bilaterally.  No wheezes, rales, or rhonci.    ABDOMEN:  Non-tender and non-distended.    EXTREMITIES:  Warm and well perfused.  Brisk capillary refill.  No peripheral edema.  2+ DP and PT pulses.  NEUROLOGICAL:  Normal mental status.  Appropriate and conversant.  5/5 strength and sensation.  CN III through XII intact.    SKIN:  No rashes or ecchymoses.    BACK:  Atraumatic.  No CVA tenderness to palpation.      Labs  "Reviewed   CBC W/ AUTO DIFFERENTIAL - Abnormal; Notable for the following:        Result Value    RBC 3.85 (*)     Hemoglobin 10.6 (*)     Hematocrit 32.8 (*)     RDW 18.1 (*)     MPV 8.3 (*)     All other components within normal limits   COMPREHENSIVE METABOLIC PANEL - Abnormal; Notable for the following:     Potassium 3.4 (*)     CO2 21 (*)     All other components within normal limits   MAGNESIUM   PHOSPHORUS   TSH       Discharge Medication List as of 7/27/2017 10:05 AM      CONTINUE these medications which have NOT CHANGED    Details   albuterol (PROVENTIL) 2.5 mg /3 mL (0.083 %) nebulizer solution 2.5 mg., Until Discontinued, Historical Med      ALBUTEROL SULFATE (VENTOLIN HFA INHL) Inhale into the lungs., Until Discontinued, Historical Med      alprazolam (XANAX) 1 MG tablet Starting 5/25/2016, Until Discontinued, Historical Med      aripiprazole (ABILIFY) 20 MG Tab Take 5 mg by mouth once daily., Until Discontinued, Historical Med      BD INSULIN PEN NEEDLE UF MINI 31 gauge x 3/16" Ndle Starting 2/10/2016, Until Discontinued, Historical Med      dexlansoprazole (DEXILANT) 60 mg capsule Take 60 mg by mouth once daily., Until Discontinued, Historical Med      epinastine 0.05 % ophthalmic solution Place 1 drop into both eyes 2 (two) times daily., Starting 4/3/2017, Until Discontinued, Historical Med      ferrous sulfate 325 mg (65 mg iron) Tab tablet Take 1 tablet (325 mg total) by mouth once daily., Starting 2/16/2017, Until Discontinued, Normal      fluticasone-salmeterol 250-50 mcg/dose (ADVAIR) 250-50 mcg/dose diskus inhaler Inhale 1 puff into the lungs., Until Discontinued, Historical Med      hydrocodone-acetaminophen 10-325mg (NORCO)  mg Tab Take 1 tablet by mouth 2 (two) times daily as needed., Starting 5/3/2017, Until Discontinued, Historical Med      lamotrigine (LAMICTAL) 150 MG Tab Take 300 mg by mouth every evening. , Starting 3/28/2016, Until Discontinued, Historical Med      methocarbamol " (ROBAXIN) 500 MG Tab Starting 11/3/2016, Until Discontinued, Historical Med      multivitamin (THERAGRAN) per tablet Take 1 tablet by mouth once daily., Until Discontinued, Historical Med      ondansetron (ZOFRAN-ODT) 4 MG TbDL Take 1 tablet (4 mg total) by mouth every 6 (six) hours as needed., Starting 12/8/2016, Until Discontinued, Normal      promethazine (PHENERGAN) 25 MG tablet Take 25 mg by mouth every 4 (four) hours., Until Discontinued, Historical Med      sertraline (ZOLOFT) 100 MG tablet Take 150 mg by mouth every evening. , Until Discontinued, Historical Med      simvastatin (ZOCOR) 20 MG tablet Take 1 tablet (20 mg total) by mouth every evening., Starting 2/16/2017, Until Fri 2/16/18, Normal      sucralfate (CARAFATE) 100 mg/mL suspension Take 10 mLs (1 g total) by mouth 4 (four) times daily., Starting 11/21/2016, Until Discontinued, Normal      sumatriptan (IMITREX) 100 MG tablet TAKE ONE TABLET BY MOUTH EVERY DAY AS DIRECTED. may REPEAT one dose in 2 HOURS if no relief. may start with ONE-HALF TABLET, Historical Med      topiramate (TOPAMAX) 200 MG Tab Take 200 mg by mouth., Starting 9/29/2015, Until Discontinued, Historical Med      ursodiol (ACTIGALL) 300 mg capsule Starting 5/18/2016, Until Discontinued, Historical Med      VICTOZA 3-JOHN 0.6 mg/0.1 mL (18 mg/3 mL) PnIj Starting 11/3/2016, Until Discontinued, Historical Med      zolpidem (AMBIEN) 10 mg Tab Take 5 mg by mouth nightly as needed., Until Discontinued, Historical Med             Orders Placed This Encounter   Procedures    CBC auto differential    Comprehensive metabolic panel    Magnesium    Phosphorus    TSH    EKG 12-lead       Imaging Results    None         ED Course   Comment By Time   EKG:  Sinus bradycardia, rate of 55, normal intervals and axis.  There are no acute ST or T wave changes suggestive of acute ischemia or infarction.  No arrhythmia.   Melvin Stokes MD 07/27 0919         MDM:    46 y.o. female with  feeling of fatigue with low sleep latency during the day.  Very low suspicion for emergent intracranial process such as CVA.  I do not think further brain imaging is indicated.  Acetaminophen given for headache consistent with migraine headache here.  Broad workup with other laboratories done to assess for any new process such as electrolyte deficiency, worsened anemia, hypothyroid state.  EKG reviewed with no sign of arrhythmia or ischemia.  I do not think further cardiac biomarker is indicated.  Labs reviewed and patient voiced understanding of need for follow-up for further investigation.  Detailed return precautions reviewed.    Diagnoses:    1. Fatigue     Melvin Stokes MD  07/27/17 [id number]

## 2017-08-14 ENCOUNTER — HOSPITAL ENCOUNTER (OUTPATIENT)
Facility: HOSPITAL | Age: 47
Discharge: LEFT BEFORE DISCHARGE | End: 2017-08-16
Attending: EMERGENCY MEDICINE | Admitting: INTERNAL MEDICINE
Payer: MEDICARE

## 2017-08-14 DIAGNOSIS — R10.13 EPIGASTRIC PAIN: Primary | ICD-10-CM

## 2017-08-14 LAB
ALBUMIN SERPL BCP-MCNC: 3.9 G/DL
ALP SERPL-CCNC: 94 U/L
ALT SERPL W/O P-5'-P-CCNC: 12 U/L
ANION GAP SERPL CALC-SCNC: 10 MMOL/L
AST SERPL-CCNC: 17 U/L
BASOPHILS # BLD AUTO: 0 K/UL
BASOPHILS NFR BLD: 0.5 %
BILIRUB SERPL-MCNC: 0.2 MG/DL
BUN SERPL-MCNC: 13 MG/DL
CALCIUM SERPL-MCNC: 9.1 MG/DL
CHLORIDE SERPL-SCNC: 110 MMOL/L
CO2 SERPL-SCNC: 22 MMOL/L
CREAT SERPL-MCNC: 1 MG/DL
DIFFERENTIAL METHOD: ABNORMAL
EOSINOPHIL # BLD AUTO: 0.2 K/UL
EOSINOPHIL NFR BLD: 2.4 %
ERYTHROCYTE [DISTWIDTH] IN BLOOD BY AUTOMATED COUNT: 17.4 %
EST. GFR  (AFRICAN AMERICAN): >60 ML/MIN/1.73 M^2
EST. GFR  (NON AFRICAN AMERICAN): >60 ML/MIN/1.73 M^2
GLUCOSE SERPL-MCNC: 84 MG/DL
HCT VFR BLD AUTO: 34.1 %
HGB BLD-MCNC: 11.1 G/DL
LIPASE SERPL-CCNC: 17 U/L
LYMPHOCYTES # BLD AUTO: 3.3 K/UL
LYMPHOCYTES NFR BLD: 47.7 %
MCH RBC QN AUTO: 27.9 PG
MCHC RBC AUTO-ENTMCNC: 32.5 G/DL
MCV RBC AUTO: 86 FL
MONOCYTES # BLD AUTO: 0.5 K/UL
MONOCYTES NFR BLD: 7 %
NEUTROPHILS # BLD AUTO: 3 K/UL
NEUTROPHILS NFR BLD: 42.4 %
PLATELET # BLD AUTO: 230 K/UL
PMV BLD AUTO: 9.9 FL
POTASSIUM SERPL-SCNC: 3.8 MMOL/L
PROT SERPL-MCNC: 7.6 G/DL
RBC # BLD AUTO: 3.96 M/UL
SODIUM SERPL-SCNC: 142 MMOL/L
WBC # BLD AUTO: 7 K/UL

## 2017-08-14 PROCEDURE — 96376 TX/PRO/DX INJ SAME DRUG ADON: CPT

## 2017-08-14 PROCEDURE — 99285 EMERGENCY DEPT VISIT HI MDM: CPT | Mod: 25

## 2017-08-14 PROCEDURE — 25000003 PHARM REV CODE 250: Performed by: EMERGENCY MEDICINE

## 2017-08-14 PROCEDURE — 96375 TX/PRO/DX INJ NEW DRUG ADDON: CPT

## 2017-08-14 PROCEDURE — 25500020 PHARM REV CODE 255

## 2017-08-14 PROCEDURE — 85025 COMPLETE CBC W/AUTO DIFF WBC: CPT

## 2017-08-14 PROCEDURE — 83690 ASSAY OF LIPASE: CPT

## 2017-08-14 PROCEDURE — 63600175 PHARM REV CODE 636 W HCPCS: Performed by: EMERGENCY MEDICINE

## 2017-08-14 PROCEDURE — 36415 COLL VENOUS BLD VENIPUNCTURE: CPT

## 2017-08-14 PROCEDURE — 80053 COMPREHEN METABOLIC PANEL: CPT

## 2017-08-14 PROCEDURE — 96374 THER/PROPH/DIAG INJ IV PUSH: CPT

## 2017-08-14 PROCEDURE — 96361 HYDRATE IV INFUSION ADD-ON: CPT

## 2017-08-14 RX ORDER — HYDROMORPHONE HYDROCHLORIDE 1 MG/ML
1 INJECTION, SOLUTION INTRAMUSCULAR; INTRAVENOUS; SUBCUTANEOUS
Status: COMPLETED | OUTPATIENT
Start: 2017-08-14 | End: 2017-08-14

## 2017-08-14 RX ORDER — CELECOXIB 100 MG/1
100 CAPSULE ORAL DAILY
COMMUNITY

## 2017-08-14 RX ORDER — BUTALBITAL, ACETAMINOPHEN AND CAFFEINE 50; 325; 40 MG/1; MG/1; MG/1
1 TABLET ORAL 2 TIMES DAILY PRN
COMMUNITY
End: 2020-11-24

## 2017-08-14 RX ORDER — ONDANSETRON 2 MG/ML
8 INJECTION INTRAMUSCULAR; INTRAVENOUS
Status: COMPLETED | OUTPATIENT
Start: 2017-08-14 | End: 2017-08-14

## 2017-08-14 RX ADMIN — HYDROMORPHONE HYDROCHLORIDE 1 MG: 1 INJECTION, SOLUTION INTRAMUSCULAR; INTRAVENOUS; SUBCUTANEOUS at 09:08

## 2017-08-14 RX ADMIN — HYDROMORPHONE HYDROCHLORIDE 1 MG: 1 INJECTION, SOLUTION INTRAMUSCULAR; INTRAVENOUS; SUBCUTANEOUS at 11:08

## 2017-08-14 RX ADMIN — IOHEXOL 30 ML: 350 INJECTION, SOLUTION INTRAVENOUS at 09:08

## 2017-08-14 RX ADMIN — IOHEXOL 75 ML: 350 INJECTION, SOLUTION INTRAVENOUS at 11:08

## 2017-08-14 RX ADMIN — SODIUM CHLORIDE 1000 ML: 0.9 INJECTION, SOLUTION INTRAVENOUS at 09:08

## 2017-08-14 RX ADMIN — ONDANSETRON 8 MG: 2 INJECTION INTRAMUSCULAR; INTRAVENOUS at 09:08

## 2017-08-15 LAB
ALBUMIN SERPL BCP-MCNC: 3.3 G/DL
ALP SERPL-CCNC: 81 U/L
ALT SERPL W/O P-5'-P-CCNC: 9 U/L
ANION GAP SERPL CALC-SCNC: 6 MMOL/L
AST SERPL-CCNC: 15 U/L
BASOPHILS # BLD AUTO: 0 K/UL
BASOPHILS NFR BLD: 0.4 %
BILIRUB SERPL-MCNC: 0.2 MG/DL
BUN SERPL-MCNC: 10 MG/DL
CALCIUM SERPL-MCNC: 8.7 MG/DL
CHLORIDE SERPL-SCNC: 110 MMOL/L
CO2 SERPL-SCNC: 24 MMOL/L
CREAT SERPL-MCNC: 0.9 MG/DL
DIFFERENTIAL METHOD: ABNORMAL
EOSINOPHIL # BLD AUTO: 0.1 K/UL
EOSINOPHIL NFR BLD: 2.2 %
ERYTHROCYTE [DISTWIDTH] IN BLOOD BY AUTOMATED COUNT: 17.2 %
EST. GFR  (AFRICAN AMERICAN): >60 ML/MIN/1.73 M^2
EST. GFR  (NON AFRICAN AMERICAN): >60 ML/MIN/1.73 M^2
GLUCOSE SERPL-MCNC: 78 MG/DL
HCT VFR BLD AUTO: 30.3 %
HGB BLD-MCNC: 10.1 G/DL
LYMPHOCYTES # BLD AUTO: 2.7 K/UL
LYMPHOCYTES NFR BLD: 52 %
MAGNESIUM SERPL-MCNC: 2.2 MG/DL
MCH RBC QN AUTO: 28.6 PG
MCHC RBC AUTO-ENTMCNC: 33.2 G/DL
MCV RBC AUTO: 86 FL
MONOCYTES # BLD AUTO: 0.4 K/UL
MONOCYTES NFR BLD: 7.5 %
NEUTROPHILS # BLD AUTO: 2 K/UL
NEUTROPHILS NFR BLD: 37.9 %
PHOSPHATE SERPL-MCNC: 3.4 MG/DL
PLATELET # BLD AUTO: 201 K/UL
PMV BLD AUTO: 9.6 FL
POTASSIUM SERPL-SCNC: 3.6 MMOL/L
PROT SERPL-MCNC: 6.4 G/DL
RBC # BLD AUTO: 3.52 M/UL
SODIUM SERPL-SCNC: 140 MMOL/L
WBC # BLD AUTO: 5.2 K/UL

## 2017-08-15 PROCEDURE — 94640 AIRWAY INHALATION TREATMENT: CPT

## 2017-08-15 PROCEDURE — 84100 ASSAY OF PHOSPHORUS: CPT

## 2017-08-15 PROCEDURE — 83735 ASSAY OF MAGNESIUM: CPT

## 2017-08-15 PROCEDURE — 25000003 PHARM REV CODE 250: Performed by: NURSE PRACTITIONER

## 2017-08-15 PROCEDURE — 63600175 PHARM REV CODE 636 W HCPCS: Performed by: HOSPITALIST

## 2017-08-15 PROCEDURE — 63600175 PHARM REV CODE 636 W HCPCS: Performed by: NURSE PRACTITIONER

## 2017-08-15 PROCEDURE — 85025 COMPLETE CBC W/AUTO DIFF WBC: CPT

## 2017-08-15 PROCEDURE — G0378 HOSPITAL OBSERVATION PER HR: HCPCS

## 2017-08-15 PROCEDURE — 96376 TX/PRO/DX INJ SAME DRUG ADON: CPT

## 2017-08-15 PROCEDURE — 80053 COMPREHEN METABOLIC PANEL: CPT

## 2017-08-15 PROCEDURE — 36415 COLL VENOUS BLD VENIPUNCTURE: CPT

## 2017-08-15 PROCEDURE — 99215 OFFICE O/P EST HI 40 MIN: CPT | Mod: ,,, | Performed by: INTERNAL MEDICINE

## 2017-08-15 PROCEDURE — 99214 OFFICE O/P EST MOD 30 MIN: CPT | Mod: ,,, | Performed by: SURGERY

## 2017-08-15 PROCEDURE — 25000242 PHARM REV CODE 250 ALT 637 W/ HCPCS: Performed by: NURSE PRACTITIONER

## 2017-08-15 PROCEDURE — 94761 N-INVAS EAR/PLS OXIMETRY MLT: CPT

## 2017-08-15 PROCEDURE — 25000003 PHARM REV CODE 250: Performed by: HOSPITALIST

## 2017-08-15 RX ORDER — SUCRALFATE 1 G/10ML
1 SUSPENSION ORAL 4 TIMES DAILY
Status: DISCONTINUED | OUTPATIENT
Start: 2017-08-15 | End: 2017-08-16 | Stop reason: HOSPADM

## 2017-08-15 RX ORDER — LANOLIN ALCOHOL/MO/W.PET/CERES
800 CREAM (GRAM) TOPICAL
Status: DISCONTINUED | OUTPATIENT
Start: 2017-08-15 | End: 2017-08-16 | Stop reason: HOSPADM

## 2017-08-15 RX ORDER — MORPHINE SULFATE 2 MG/ML
6 INJECTION, SOLUTION INTRAMUSCULAR; INTRAVENOUS EVERY 4 HOURS PRN
Status: DISCONTINUED | OUTPATIENT
Start: 2017-08-15 | End: 2017-08-16 | Stop reason: HOSPADM

## 2017-08-15 RX ORDER — HYDROCODONE BITARTRATE AND ACETAMINOPHEN 10; 325 MG/1; MG/1
1 TABLET ORAL EVERY 4 HOURS PRN
Status: DISCONTINUED | OUTPATIENT
Start: 2017-08-15 | End: 2017-08-16 | Stop reason: HOSPADM

## 2017-08-15 RX ORDER — FLUTICASONE FUROATE AND VILANTEROL 100; 25 UG/1; UG/1
1 POWDER RESPIRATORY (INHALATION) DAILY
Status: DISCONTINUED | OUTPATIENT
Start: 2017-08-15 | End: 2017-08-16 | Stop reason: HOSPADM

## 2017-08-15 RX ORDER — MORPHINE SULFATE 4 MG/ML
4 INJECTION, SOLUTION INTRAMUSCULAR; INTRAVENOUS EVERY 4 HOURS PRN
Status: DISCONTINUED | OUTPATIENT
Start: 2017-08-15 | End: 2017-08-15

## 2017-08-15 RX ORDER — HYDROCODONE BITARTRATE AND ACETAMINOPHEN 10; 325 MG/1; MG/1
1 TABLET ORAL EVERY 4 HOURS PRN
Status: DISCONTINUED | OUTPATIENT
Start: 2017-08-15 | End: 2017-08-15

## 2017-08-15 RX ORDER — ALPRAZOLAM 1 MG/1
1 TABLET ORAL 2 TIMES DAILY PRN
Status: DISCONTINUED | OUTPATIENT
Start: 2017-08-15 | End: 2017-08-16 | Stop reason: HOSPADM

## 2017-08-15 RX ORDER — ACETAMINOPHEN 500 MG
1000 TABLET ORAL EVERY 6 HOURS PRN
Status: DISCONTINUED | OUTPATIENT
Start: 2017-08-15 | End: 2017-08-16 | Stop reason: HOSPADM

## 2017-08-15 RX ORDER — LAMOTRIGINE 100 MG/1
300 TABLET ORAL NIGHTLY
Status: DISCONTINUED | OUTPATIENT
Start: 2017-08-15 | End: 2017-08-16 | Stop reason: HOSPADM

## 2017-08-15 RX ORDER — SERTRALINE HYDROCHLORIDE 50 MG/1
150 TABLET, FILM COATED ORAL NIGHTLY
Status: DISCONTINUED | OUTPATIENT
Start: 2017-08-15 | End: 2017-08-16 | Stop reason: HOSPADM

## 2017-08-15 RX ORDER — AMOXICILLIN 250 MG
1 CAPSULE ORAL 2 TIMES DAILY
Status: DISCONTINUED | OUTPATIENT
Start: 2017-08-15 | End: 2017-08-16 | Stop reason: HOSPADM

## 2017-08-15 RX ORDER — ZOLPIDEM TARTRATE 5 MG/1
5 TABLET ORAL NIGHTLY PRN
Status: DISCONTINUED | OUTPATIENT
Start: 2017-08-15 | End: 2017-08-16 | Stop reason: HOSPADM

## 2017-08-15 RX ORDER — FERROUS SULFATE 325(65) MG
325 TABLET, DELAYED RELEASE (ENTERIC COATED) ORAL DAILY
Status: DISCONTINUED | OUTPATIENT
Start: 2017-08-15 | End: 2017-08-16 | Stop reason: HOSPADM

## 2017-08-15 RX ORDER — POTASSIUM CHLORIDE 20 MEQ/15ML
60 SOLUTION ORAL
Status: DISCONTINUED | OUTPATIENT
Start: 2017-08-15 | End: 2017-08-16 | Stop reason: HOSPADM

## 2017-08-15 RX ORDER — POTASSIUM CHLORIDE 20 MEQ/15ML
40 SOLUTION ORAL
Status: DISCONTINUED | OUTPATIENT
Start: 2017-08-15 | End: 2017-08-16 | Stop reason: HOSPADM

## 2017-08-15 RX ORDER — HYDROCODONE BITARTRATE AND ACETAMINOPHEN 5; 325 MG/1; MG/1
1 TABLET ORAL EVERY 4 HOURS PRN
Status: DISCONTINUED | OUTPATIENT
Start: 2017-08-15 | End: 2017-08-15

## 2017-08-15 RX ORDER — SIMVASTATIN 10 MG/1
20 TABLET, FILM COATED ORAL NIGHTLY
Status: DISCONTINUED | OUTPATIENT
Start: 2017-08-15 | End: 2017-08-16 | Stop reason: HOSPADM

## 2017-08-15 RX ORDER — ONDANSETRON 2 MG/ML
8 INJECTION INTRAMUSCULAR; INTRAVENOUS EVERY 8 HOURS PRN
Status: DISCONTINUED | OUTPATIENT
Start: 2017-08-15 | End: 2017-08-16 | Stop reason: HOSPADM

## 2017-08-15 RX ORDER — METHOCARBAMOL 500 MG/1
500 TABLET, FILM COATED ORAL 3 TIMES DAILY PRN
Status: DISCONTINUED | OUTPATIENT
Start: 2017-08-15 | End: 2017-08-16 | Stop reason: HOSPADM

## 2017-08-15 RX ORDER — TOPIRAMATE 100 MG/1
300 TABLET, FILM COATED ORAL 2 TIMES DAILY
Status: DISCONTINUED | OUTPATIENT
Start: 2017-08-15 | End: 2017-08-16 | Stop reason: HOSPADM

## 2017-08-15 RX ADMIN — FLUTICASONE FUROATE AND VILANTEROL TRIFENATATE 1 PUFF: 100; 25 POWDER RESPIRATORY (INHALATION) at 08:08

## 2017-08-15 RX ADMIN — STANDARDIZED SENNA CONCENTRATE AND DOCUSATE SODIUM 1 TABLET: 8.6; 5 TABLET, FILM COATED ORAL at 08:08

## 2017-08-15 RX ADMIN — LAMOTRIGINE 300 MG: 100 TABLET ORAL at 08:08

## 2017-08-15 RX ADMIN — HYDROCODONE BITARTRATE AND ACETAMINOPHEN 1 TABLET: 10; 325 TABLET ORAL at 04:08

## 2017-08-15 RX ADMIN — SERTRALINE HYDROCHLORIDE 150 MG: 50 TABLET ORAL at 08:08

## 2017-08-15 RX ADMIN — ONDANSETRON 8 MG: 2 INJECTION INTRAMUSCULAR; INTRAVENOUS at 11:08

## 2017-08-15 RX ADMIN — SIMVASTATIN 20 MG: 10 TABLET, FILM COATED ORAL at 08:08

## 2017-08-15 RX ADMIN — HYDROCODONE BITARTRATE AND ACETAMINOPHEN 1 TABLET: 10; 325 TABLET ORAL at 06:08

## 2017-08-15 RX ADMIN — TOPIRAMATE 300 MG: 100 TABLET, FILM COATED ORAL at 08:08

## 2017-08-15 RX ADMIN — PROMETHAZINE HYDROCHLORIDE 12.5 MG: 25 INJECTION INTRAMUSCULAR; INTRAVENOUS at 05:08

## 2017-08-15 RX ADMIN — SUCRALFATE 1 G: 1 SUSPENSION ORAL at 04:08

## 2017-08-15 RX ADMIN — HYDROCODONE BITARTRATE AND ACETAMINOPHEN 1 TABLET: 10; 325 TABLET ORAL at 01:08

## 2017-08-15 RX ADMIN — SUCRALFATE 1 G: 1 SUSPENSION ORAL at 11:08

## 2017-08-15 RX ADMIN — METHOCARBAMOL 500 MG: 500 TABLET ORAL at 04:08

## 2017-08-15 RX ADMIN — MORPHINE SULFATE 6 MG: 2 INJECTION, SOLUTION INTRAMUSCULAR; INTRAVENOUS at 07:08

## 2017-08-15 RX ADMIN — HYDROCODONE BITARTRATE AND ACETAMINOPHEN 1 TABLET: 10; 325 TABLET ORAL at 11:08

## 2017-08-15 NOTE — HPI
Ms. Elliott presented to ER last night with abdominal pain.  Location:  Epigastric.  Radiation? No.  Associated with vomiting.  Duration: about one week.  Onset:  Sudden.  Severity: 10/10.  No relief after taking her pain killers at home.  She underwent gastric bypass surgery a year ago.  ER physician last night contacted the surgeon, who requested admission to hospitalist service.

## 2017-08-15 NOTE — ED NOTES
Patient identifiers for Bessie Elliott checked and correct.  LOC: Patient is awake, alert, and aware of environment with an appropriate affect. Patient is oriented x 3 and speaking appropriately.  APPEARANCE: Patient resting comfortably and in no acute distress. Patient is clean and well groomed, patient's clothing is properly fastened.  SKIN: The skin is warm and dry. Patient has normal skin turgor and moist mucus membrances. Skin is intact; no bruising or breakdown noted.  MUSCULOSKELETAL: Patient is moving all extremities well, no obvious deformities noted. Pulses intact.   RESPIRATORY: Airway is open and patent. Respirations are spontaneous and non-labored with normal effort and rate.  CARDIAC: Patient has a normal rate and rhythm. No peripheral edema noted. Capillary refill < 3 seconds.  ABDOMEN: No distention noted. Bowel sounds active in all 4 quadrants. Epigastric tenderness, intermittent since surgery, c/o vomiting last night. No vomiting noted. Reports pain is relieved by Dilaudid  NEUROLOGICAL: PERRL. Facial expression is symmetrical. Hand grasps are equal bilaterally. Normal sensation in all extremities when touched with finger.  Allergies reported:   Review of patient's allergies indicates:   Allergen Reactions    Ace inhibitors Other (See Comments)     Cough

## 2017-08-15 NOTE — ED PROVIDER NOTES
Encounter Date: 2017    SCRIBE #1 NOTE: IMounika, am scribing for, and in the presence of, Dr. Michaud.       History     Chief Complaint   Patient presents with    Abdominal Pain     Associated nausea and vomiting. Gastric bypass a year ago. Spoke with Dr. Jo and she said he told her he would admit her.        2017 9:19 PM     Chief Complaint: Upper Abdominal Pain      Bessie Elliott is a 46 y.o. female with a history of Dm, HLD, hiatal hernia, S/P gastric bypass who presents to the ED with complaints of upper abdominal pain associated with nausea and vomiting. She as had abdominal pain for a week. The patient believes the vomiting is secondary to the pain. She has had a gastric bypass a year ago. The patient endorses recently having low iron, potassium, and vitamin C levels. Patient denies chest pain, back pain, coughing, diarrhea, urinary abnormalities, and SOB. SHx includes hysterectomy and gastric bypass. Patient's known drug allergies includes ace inhibitors.       The history is provided by the patient.     Review of patient's allergies indicates:   Allergen Reactions    Ace inhibitors Other (See Comments)     Cough     Past Medical History:   Diagnosis Date    Arthritis     bilateral knees and hands    Asthma     Bipolar 1 disorder     Diabetes mellitus     Hiatal hernia     HLD (hyperlipidemia)     S/P gastric bypass 2016    by     Shoulder injury     left shoulder rotator cuff repair     Past Surgical History:   Procedure Laterality Date     SECTION      GASTRIC BYPASS  2016        HYSTERECTOMY      JOINT REPLACEMENT      bilateral knees    ROTATOR CUFF REPAIR Left 2016    TOTAL KNEE ARTHROPLASTY Bilateral 2015     Family History   Problem Relation Age of Onset    Diabetes Mother     Hyperlipidemia Mother     Hypertension Mother     Heart disease Father     Hypertension Brother      Social History   Substance Use Topics     Smoking status: Never Smoker    Smokeless tobacco: Never Used    Alcohol use No     Review of Systems   Constitutional: Negative for fever.   HENT: Negative for sore throat.    Respiratory: Negative for shortness of breath.    Cardiovascular: Negative for chest pain.   Gastrointestinal: Positive for abdominal pain, nausea and vomiting.   Genitourinary: Negative for dysuria.   Musculoskeletal: Negative for back pain.   Skin: Negative for rash.   Neurological: Negative for weakness.   Hematological: Does not bruise/bleed easily.   All other systems reviewed and are negative.      Physical Exam     Initial Vitals [08/14/17 2040]   BP Pulse Resp Temp SpO2   136/76 (!) 50 16 97.5 °F (36.4 °C) 100 %      MAP       96         Physical Exam    Nursing note and vitals reviewed.  Constitutional: She appears well-developed and well-nourished.   HENT:   Head: Normocephalic and atraumatic.   Eyes: EOM are normal.   Neck: Normal range of motion. Neck supple.   Cardiovascular: Normal rate, regular rhythm and normal heart sounds.   Pulmonary/Chest: Breath sounds normal. No respiratory distress.   Abdominal: Soft. There is tenderness (Mild) in the epigastric area.   Musculoskeletal: Normal range of motion.   Neurological: She is alert and oriented to person, place, and time.   Skin: Skin is warm and dry.   Psychiatric: She has a normal mood and affect. Her behavior is normal. Judgment and thought content normal.         ED Course   Procedures  Labs Reviewed   CBC W/ AUTO DIFFERENTIAL   COMPREHENSIVE METABOLIC PANEL             Medical Decision Making:   History:   Old Medical Records: I decided to obtain old medical records.  Clinical Tests:   Lab Tests: Reviewed and Ordered  Radiological Study: Ordered            Scribe Attestation:   Scribe #1: I performed the above scribed service and the documentation accurately describes the services I performed. I attest to the accuracy of the note.    Attending Attestation:            Physician Attestation for Scribe:  Physician Attestation Statement for Scribe #1: I, Dr. Michaud, reviewed documentation, as scribed by Mounika Bryan in my presence, and it is both accurate and complete.                 ED Course   Comment By Time   Kike to f/u CT read, will be admitted regardless per dr ousmane Michaud MD 08/15 0007   CT-AP:  NAD. S/p GBP.  Small contrast reflux with hiatal hernia. (rad read) Melvin Stokes MD 08/15 0013     Clinical Impression:   There were no encounter diagnoses.            46-year-old female with a gastric bypass years ago presents for a week of epigastric pain and vomiting which has been constant but worsening at times.  I do not suspect myocardial infarction.  Referred to the emergency room by her gastric surgeon whose instructions were to admit her regardless of the CT report.  CT report signed over to Dr. Stokes for follow-up.               Lester Mihcaud MD  08/15/17 [id number]

## 2017-08-15 NOTE — ASSESSMENT & PLAN NOTE
Will consult with Dr. Jo, her bariatric surgeon, for advice on further workup.  Likely will need endoscopic eval to rule out anastomotic ulcer.  Will provide analgesia and anti-emetics.

## 2017-08-15 NOTE — CONSULTS
[facility]  General Surgery  Consult Note    Patient Name: Bessie Elliott  MRN: [id number]  Code Status: Full Code  Admission Date: 8/14/2017  Hospital Length of Stay: 0 days  Attending Physician: Slick Angela MD  Primary Care Provider: SHAY Sanderson    Patient information was obtained from patient and ER records.     Inpatient consult to General Surgery  Consult performed by: BERNICE HENSLEY  Consult ordered by: BEAU EDMONDS  Reason for consult: abdominal pain  Assessment/Recommendations: Gi consult for EGD to eval for marginal ulcer  ruq ultrasound        Subjective:     Principal Problem: Abdominal pain    History of Present Illness: 45 y/o with a history of gastric bypass presents with 1 week worth of sharp epigastric stabbing pain and tenderness, 10/10, associated with nausea and vomiting. Non radiating, intermittent, no worsening factors no alleviating factors.  Happened 1 month ago, CT negative.  Denies smoking, denies nsaid use.    No current facility-administered medications on file prior to encounter.      Current Outpatient Prescriptions on File Prior to Encounter   Medication Sig    albuterol (PROVENTIL) 2.5 mg /3 mL (0.083 %) nebulizer solution Take 2.5 mg by nebulization every 6 (six) hours as needed for Wheezing or Shortness of Breath.     ALBUTEROL SULFATE (VENTOLIN HFA INHL) Inhale into the lungs.    alprazolam (XANAX) 1 MG tablet Take 1 mg by mouth 2 (two) times daily as needed for Anxiety.     dexlansoprazole (DEXILANT) 60 mg capsule Take 60 mg by mouth once daily.    epinastine 0.05 % ophthalmic solution Place 1 drop into both eyes 2 (two) times daily.    ferrous sulfate 325 mg (65 mg iron) Tab tablet Take 1 tablet (325 mg total) by mouth once daily.    fluticasone-salmeterol 250-50 mcg/dose (ADVAIR) 250-50 mcg/dose diskus inhaler Inhale 1 puff into the lungs every morning.     hydrocodone-acetaminophen 10-325mg (NORCO)  mg Tab Take 1 tablet by  mouth 2 (two) times daily as needed for Pain.     lamotrigine (LAMICTAL) 150 MG Tab Take 300 mg by mouth every evening.     methocarbamol (ROBAXIN) 500 MG Tab Take 500 mg by mouth 3 (three) times daily as needed.     multivitamin (THERAGRAN) per tablet Take 1 tablet by mouth once daily.    ondansetron (ZOFRAN-ODT) 4 MG TbDL Take 1 tablet (4 mg total) by mouth every 6 (six) hours as needed. (Patient taking differently: Take 4 mg by mouth every 6 (six) hours as needed (nausea). )    promethazine (PHENERGAN) 25 MG tablet Take 25 mg by mouth 2 (two) times daily as needed for Nausea.     sertraline (ZOLOFT) 100 MG tablet Take 150 mg by mouth every evening.     simvastatin (ZOCOR) 20 MG tablet Take 1 tablet (20 mg total) by mouth every evening.    sucralfate (CARAFATE) 100 mg/mL suspension Take 10 mLs (1 g total) by mouth 4 (four) times daily.    sumatriptan (IMITREX) 100 MG tablet TAKE ONE TABLET BY MOUTH EVERY DAY AS DIRECTED. may REPEAT one dose in 2 HOURS if no relief. may start with ONE-HALF TABLET prn migraines    topiramate (TOPAMAX) 200 MG Tab Take 300 mg by mouth 2 (two) times daily.     zolpidem (AMBIEN) 10 mg Tab Take 10 mg by mouth nightly as needed (insomnia).        Review of patient's allergies indicates:   Allergen Reactions    Ace inhibitors Other (See Comments)     Cough       Past Medical History:   Diagnosis Date    Arthritis     bilateral knees and hands    Asthma     Bipolar 1 disorder     Diabetes mellitus     Hiatal hernia     HLD (hyperlipidemia)     S/P gastric bypass 2016    by     Shoulder injury     left shoulder rotator cuff repair     Past Surgical History:   Procedure Laterality Date     SECTION      GASTRIC BYPASS  2016        HYSTERECTOMY      JOINT REPLACEMENT      bilateral knees    ROTATOR CUFF REPAIR Left 2016    TOTAL KNEE ARTHROPLASTY Bilateral 2015     Family History     Problem Relation (Age of Onset)    Diabetes  Mother    Heart disease Father    Hyperlipidemia Mother    Hypertension Mother, Brother        Social History Main Topics    Smoking status: Never Smoker    Smokeless tobacco: Never Used    Alcohol use No    Drug use: No    Sexual activity: Not on file     Review of Systems   Constitutional: Negative for activity change, appetite change, chills, fatigue and fever.   HENT: Negative for congestion, sinus pressure and sneezing.    Eyes: Negative for discharge and itching.   Respiratory: Negative for apnea, cough, choking and chest tightness.    Cardiovascular: Negative for chest pain and palpitations.   Gastrointestinal: Positive for abdominal pain, nausea and vomiting. Negative for abdominal distention, anal bleeding, blood in stool, constipation, diarrhea and rectal pain.   Endocrine: Negative for polydipsia and polyphagia.   Genitourinary: Negative for difficulty urinating and dysuria.   Musculoskeletal: Negative for arthralgias and back pain.   Skin: Negative for rash and wound.   Neurological: Negative for dizziness, light-headedness and headaches.   Psychiatric/Behavioral: Negative for agitation and confusion.     Objective:     Vital Signs (Most Recent):  Temp: 97.5 °F (36.4 °C) (08/15/17 0738)  Pulse: (!) 53 (08/15/17 0847)  Resp: 16 (08/15/17 0847)  BP: 126/65 (08/15/17 0738)  SpO2: 100 % (08/15/17 0847) Vital Signs (24h Range):  Temp:  [97.5 °F (36.4 °C)] 97.5 °F (36.4 °C)  Pulse:  [50-60] 53  Resp:  [16-18] 16  SpO2:  [100 %] 100 %  BP: (126-152)/(65-80) 126/65     Weight: 76.2 kg (167 lb 15.9 oz)  Body mass index is 27.96 kg/m².    Physical Exam   Constitutional: She is oriented to person, place, and time. She appears well-developed and well-nourished. No distress.   HENT:   Head: Normocephalic and atraumatic.   Mouth/Throat: No oropharyngeal exudate.   Eyes: Conjunctivae and EOM are normal. Pupils are equal, round, and reactive to light. No scleral icterus.   Neck: Normal range of motion. Neck supple.  No JVD present. No tracheal deviation present.   Cardiovascular: Normal rate, regular rhythm and normal heart sounds.    Pulmonary/Chest: Effort normal and breath sounds normal. No stridor.   Abdominal: Soft. Bowel sounds are normal. She exhibits no distension and no mass. There is tenderness. There is no rebound and no guarding.   Well healed gastric bypass scars  Tender in epigastrum     Musculoskeletal: Normal range of motion. She exhibits no edema or tenderness.   Neurological: She is alert and oriented to person, place, and time. No cranial nerve deficit.   Skin: Skin is warm and dry. No rash noted. She is not diaphoretic. No erythema.   Psychiatric: She has a normal mood and affect. Her behavior is normal.   Nursing note and vitals reviewed.      Significant Labs:  CBC:   Recent Labs  Lab 08/15/17  0453   WBC 5.20   RBC 3.52*   HGB 10.1*   HCT 30.3*      MCV 86   MCH 28.6   MCHC 33.2     CMP:   Recent Labs  Lab 08/15/17  0453   GLU 78   CALCIUM 8.7   ALBUMIN 3.3*   PROT 6.4      K 3.6   CO2 24      BUN 10   CREATININE 0.9   ALKPHOS 81   ALT 9*   AST 15   BILITOT 0.2       Significant Diagnostics:  CT: I have reviewed all pertinent results/findings within the past 24 hours and my personal findings are:  no internal hernia, no mesenteric swirling, gallbladder fluid filled distended, no obvious stones, small hiatal hernia    Assessment/Plan:     Epigastric pain    Suspect marginal ulcer vs cholecystitis.  Unlikely internal hernia as she has no signs of obstruction although intermittent internal hernia remains possibility.    RUQ us to assess Gallbladder, may need hida if no obvious stones  GI consult for endoscopy to evaluate for marginal ulcer- continue ppi          VTE Risk Mitigation         Ordered     Low Risk of VTE  Once      08/15/17 0123          Thank you for your consult. I will follow-up with patient. Please contact us if you have any additional questions.    Peggy Jo,  MD  General Surgery  [facility]

## 2017-08-15 NOTE — PLAN OF CARE
08/15/17 1524   Discharge Assessment   Assessment Type Discharge Planning Assessment   Confirmed/corrected address and phone number on facesheet? Yes   Assessment information obtained from? Patient   Prior to hospitilization cognitive status: Alert/Oriented   Prior to hospitalization functional status: Independent   Current cognitive status: Alert/Oriented   Current Functional Status: Independent   Arrived From home or self-care   Lives With alone   Able to Return to Prior Arrangements yes   Is patient able to care for self after discharge? Yes   Patient's perception of discharge disposition home or selfcare   Readmission Within The Last 30 Days no previous admission in last 30 days   Patient currently being followed by outpatient case management? No   Patient currently receives home health services? No   Does the patient currently use HME? No   Patient currently receives private duty nursing? No   Patient currently receives any other outside agency services? No   Equipment Currently Used at Home none   Do you have any problems affording any of your prescribed medications? No   Is the patient taking medications as prescribed? yes   Do you have any financial concerns preventing you from receiving the healthcare you need? No   Does the patient have transportation to healthcare appointments? Yes   Transportation Available car;family or friend will provide   On Dialysis? No   Does the patient receive services at the [facility]? No   Discharge Plan A Home   Patient/Family In Agreement With Plan yes

## 2017-08-15 NOTE — ASSESSMENT & PLAN NOTE
Suspect marginal ulcer vs cholecystitis.  Unlikely internal hernia as she has no signs of obstruction although intermittent internal hernia remains possibility.    RUQ us to assess Gallbladder, may need hida if no obvious stones  GI consult for endoscopy to evaluate for marginal ulcer- continue ppi

## 2017-08-15 NOTE — HPI
45 y/o with a history of gastric bypass presents with 1 week worth of sharp epigastric stabbing pain and tenderness, 10/10, associated with nausea and vomiting. Non radiating, intermittent, no worsening factors no alleviating factors.  Happened 1 month ago, CT negative.  Denies smoking, denies nsaid use.

## 2017-08-15 NOTE — SUBJECTIVE & OBJECTIVE
Past Medical History:   Diagnosis Date    Arthritis     bilateral knees and hands    Asthma     Bipolar 1 disorder     Diabetes mellitus     Hiatal hernia     HLD (hyperlipidemia)     S/P gastric bypass 2016    by     Shoulder injury     left shoulder rotator cuff repair       Past Surgical History:   Procedure Laterality Date     SECTION      GASTRIC BYPASS  2016        HYSTERECTOMY      JOINT REPLACEMENT      bilateral knees    ROTATOR CUFF REPAIR Left 2016    TOTAL KNEE ARTHROPLASTY Bilateral 2015       Review of patient's allergies indicates:   Allergen Reactions    Ace inhibitors Other (See Comments)     Cough       No current facility-administered medications on file prior to encounter.      Current Outpatient Prescriptions on File Prior to Encounter   Medication Sig    albuterol (PROVENTIL) 2.5 mg /3 mL (0.083 %) nebulizer solution Take 2.5 mg by nebulization every 6 (six) hours as needed for Wheezing or Shortness of Breath.     ALBUTEROL SULFATE (VENTOLIN HFA INHL) Inhale into the lungs.    alprazolam (XANAX) 1 MG tablet Take 1 mg by mouth 2 (two) times daily as needed for Anxiety.     dexlansoprazole (DEXILANT) 60 mg capsule Take 60 mg by mouth once daily.    epinastine 0.05 % ophthalmic solution Place 1 drop into both eyes 2 (two) times daily.    ferrous sulfate 325 mg (65 mg iron) Tab tablet Take 1 tablet (325 mg total) by mouth once daily.    fluticasone-salmeterol 250-50 mcg/dose (ADVAIR) 250-50 mcg/dose diskus inhaler Inhale 1 puff into the lungs every morning.     hydrocodone-acetaminophen 10-325mg (NORCO)  mg Tab Take 1 tablet by mouth 2 (two) times daily as needed for Pain.     lamotrigine (LAMICTAL) 150 MG Tab Take 300 mg by mouth every evening.     methocarbamol (ROBAXIN) 500 MG Tab Take 500 mg by mouth 3 (three) times daily as needed.     multivitamin (THERAGRAN) per tablet Take 1 tablet by mouth once daily.    ondansetron  (ZOFRAN-ODT) 4 MG TbDL Take 1 tablet (4 mg total) by mouth every 6 (six) hours as needed. (Patient taking differently: Take 4 mg by mouth every 6 (six) hours as needed (nausea). )    promethazine (PHENERGAN) 25 MG tablet Take 25 mg by mouth 2 (two) times daily as needed for Nausea.     sertraline (ZOLOFT) 100 MG tablet Take 150 mg by mouth every evening.     simvastatin (ZOCOR) 20 MG tablet Take 1 tablet (20 mg total) by mouth every evening.    sucralfate (CARAFATE) 100 mg/mL suspension Take 10 mLs (1 g total) by mouth 4 (four) times daily.    sumatriptan (IMITREX) 100 MG tablet TAKE ONE TABLET BY MOUTH EVERY DAY AS DIRECTED. may REPEAT one dose in 2 HOURS if no relief. may start with ONE-HALF TABLET prn migraines    topiramate (TOPAMAX) 200 MG Tab Take 300 mg by mouth 2 (two) times daily.     zolpidem (AMBIEN) 10 mg Tab Take 10 mg by mouth nightly as needed (insomnia).      Family History     Problem Relation (Age of Onset)    Diabetes Mother    Heart disease Father    Hyperlipidemia Mother    Hypertension Mother, Brother        Social History Main Topics    Smoking status: Never Smoker    Smokeless tobacco: Never Used    Alcohol use No    Drug use: No    Sexual activity: Not on file     Review of Systems   Constitutional: Negative for chills and fever.   Gastrointestinal: Negative for blood in stool, constipation and diarrhea.   All other systems reviewed and are negative.    Objective:     Vital Signs (Most Recent):  Temp: 97.9 °F (36.6 °C) (08/15/17 1100)  Pulse: (!) 53 (08/15/17 1100)  Resp: 20 (08/15/17 1100)  BP: (!) 122/59 (08/15/17 1100)  SpO2: 100 % (08/15/17 1100) Vital Signs (24h Range):  Temp:  [97.5 °F (36.4 °C)-97.9 °F (36.6 °C)] 97.9 °F (36.6 °C)  Pulse:  [50-60] 53  Resp:  [16-20] 20  SpO2:  [100 %] 100 %  BP: (122-152)/(59-80) 122/59     Weight: 76.2 kg (167 lb 15.9 oz)  Body mass index is 27.96 kg/m².    Physical Exam   Constitutional: She is oriented to person, place, and time. No  distress.   HENT:   Head: Normocephalic and atraumatic.   Eyes: EOM are normal. Right eye exhibits no discharge. Left eye exhibits no discharge.   Neck: Normal range of motion. No JVD present.   Cardiovascular: Normal rate and regular rhythm.    No murmur heard.  Pulmonary/Chest: Effort normal and breath sounds normal. She exhibits no tenderness.   Abdominal: Soft. Bowel sounds are normal. She exhibits no distension. There is tenderness in the epigastric area.   Musculoskeletal: She exhibits no edema.   Neurological: She is alert and oriented to person, place, and time.   Skin: Skin is warm and dry. No rash noted. She is not diaphoretic.   Psychiatric: She has a normal mood and affect. Her behavior is normal.        Significant Labs: All pertinent labs within the past 24 hours have been reviewed.    Significant Imaging: I have reviewed all pertinent imaging results/findings within the past 24 hours.

## 2017-08-15 NOTE — SUBJECTIVE & OBJECTIVE
No current facility-administered medications on file prior to encounter.      Current Outpatient Prescriptions on File Prior to Encounter   Medication Sig    albuterol (PROVENTIL) 2.5 mg /3 mL (0.083 %) nebulizer solution Take 2.5 mg by nebulization every 6 (six) hours as needed for Wheezing or Shortness of Breath.     ALBUTEROL SULFATE (VENTOLIN HFA INHL) Inhale into the lungs.    alprazolam (XANAX) 1 MG tablet Take 1 mg by mouth 2 (two) times daily as needed for Anxiety.     dexlansoprazole (DEXILANT) 60 mg capsule Take 60 mg by mouth once daily.    epinastine 0.05 % ophthalmic solution Place 1 drop into both eyes 2 (two) times daily.    ferrous sulfate 325 mg (65 mg iron) Tab tablet Take 1 tablet (325 mg total) by mouth once daily.    fluticasone-salmeterol 250-50 mcg/dose (ADVAIR) 250-50 mcg/dose diskus inhaler Inhale 1 puff into the lungs every morning.     hydrocodone-acetaminophen 10-325mg (NORCO)  mg Tab Take 1 tablet by mouth 2 (two) times daily as needed for Pain.     lamotrigine (LAMICTAL) 150 MG Tab Take 300 mg by mouth every evening.     methocarbamol (ROBAXIN) 500 MG Tab Take 500 mg by mouth 3 (three) times daily as needed.     multivitamin (THERAGRAN) per tablet Take 1 tablet by mouth once daily.    ondansetron (ZOFRAN-ODT) 4 MG TbDL Take 1 tablet (4 mg total) by mouth every 6 (six) hours as needed. (Patient taking differently: Take 4 mg by mouth every 6 (six) hours as needed (nausea). )    promethazine (PHENERGAN) 25 MG tablet Take 25 mg by mouth 2 (two) times daily as needed for Nausea.     sertraline (ZOLOFT) 100 MG tablet Take 150 mg by mouth every evening.     simvastatin (ZOCOR) 20 MG tablet Take 1 tablet (20 mg total) by mouth every evening.    sucralfate (CARAFATE) 100 mg/mL suspension Take 10 mLs (1 g total) by mouth 4 (four) times daily.    sumatriptan (IMITREX) 100 MG tablet TAKE ONE TABLET BY MOUTH EVERY DAY AS DIRECTED. may REPEAT one dose in 2 HOURS if no relief.  may start with ONE-HALF TABLET prn migraines    topiramate (TOPAMAX) 200 MG Tab Take 300 mg by mouth 2 (two) times daily.     zolpidem (AMBIEN) 10 mg Tab Take 10 mg by mouth nightly as needed (insomnia).        Review of patient's allergies indicates:   Allergen Reactions    Ace inhibitors Other (See Comments)     Cough       Past Medical History:   Diagnosis Date    Arthritis     bilateral knees and hands    Asthma     Bipolar 1 disorder     Diabetes mellitus     Hiatal hernia     HLD (hyperlipidemia)     S/P gastric bypass 2016    by     Shoulder injury     left shoulder rotator cuff repair     Past Surgical History:   Procedure Laterality Date     SECTION      GASTRIC BYPASS  2016        HYSTERECTOMY      JOINT REPLACEMENT      bilateral knees    ROTATOR CUFF REPAIR Left 2016    TOTAL KNEE ARTHROPLASTY Bilateral 2015     Family History     Problem Relation (Age of Onset)    Diabetes Mother    Heart disease Father    Hyperlipidemia Mother    Hypertension Mother, Brother        Social History Main Topics    Smoking status: Never Smoker    Smokeless tobacco: Never Used    Alcohol use No    Drug use: No    Sexual activity: Not on file     Review of Systems   Constitutional: Negative for activity change, appetite change, chills, fatigue and fever.   HENT: Negative for congestion, sinus pressure and sneezing.    Eyes: Negative for discharge and itching.   Respiratory: Negative for apnea, cough, choking and chest tightness.    Cardiovascular: Negative for chest pain and palpitations.   Gastrointestinal: Positive for abdominal pain, nausea and vomiting. Negative for abdominal distention, anal bleeding, blood in stool, constipation, diarrhea and rectal pain.   Endocrine: Negative for polydipsia and polyphagia.   Genitourinary: Negative for difficulty urinating and dysuria.   Musculoskeletal: Negative for arthralgias and back pain.   Skin: Negative for rash and  wound.   Neurological: Negative for dizziness, light-headedness and headaches.   Psychiatric/Behavioral: Negative for agitation and confusion.     Objective:     Vital Signs (Most Recent):  Temp: 97.5 °F (36.4 °C) (08/15/17 0738)  Pulse: (!) 53 (08/15/17 0847)  Resp: 16 (08/15/17 0847)  BP: 126/65 (08/15/17 0738)  SpO2: 100 % (08/15/17 0847) Vital Signs (24h Range):  Temp:  [97.5 °F (36.4 °C)] 97.5 °F (36.4 °C)  Pulse:  [50-60] 53  Resp:  [16-18] 16  SpO2:  [100 %] 100 %  BP: (126-152)/(65-80) 126/65     Weight: 76.2 kg (167 lb 15.9 oz)  Body mass index is 27.96 kg/m².    Physical Exam   Constitutional: She is oriented to person, place, and time. She appears well-developed and well-nourished. No distress.   HENT:   Head: Normocephalic and atraumatic.   Mouth/Throat: No oropharyngeal exudate.   Eyes: Conjunctivae and EOM are normal. Pupils are equal, round, and reactive to light. No scleral icterus.   Neck: Normal range of motion. Neck supple. No JVD present. No tracheal deviation present.   Cardiovascular: Normal rate, regular rhythm and normal heart sounds.    Pulmonary/Chest: Effort normal and breath sounds normal. No stridor.   Abdominal: Soft. Bowel sounds are normal. She exhibits no distension and no mass. There is tenderness. There is no rebound and no guarding.   Well healed gastric bypass scars  Tender in epigastrum     Musculoskeletal: Normal range of motion. She exhibits no edema or tenderness.   Neurological: She is alert and oriented to person, place, and time. No cranial nerve deficit.   Skin: Skin is warm and dry. No rash noted. She is not diaphoretic. No erythema.   Psychiatric: She has a normal mood and affect. Her behavior is normal.   Nursing note and vitals reviewed.      Significant Labs:  CBC:   Recent Labs  Lab 08/15/17  0453   WBC 5.20   RBC 3.52*   HGB 10.1*   HCT 30.3*      MCV 86   MCH 28.6   MCHC 33.2     CMP:   Recent Labs  Lab 08/15/17  0454   GLU 78   CALCIUM 8.7   ALBUMIN 3.3*    PROT 6.4      K 3.6   CO2 24      BUN 10   CREATININE 0.9   ALKPHOS 81   ALT 9*   AST 15   BILITOT 0.2       Significant Diagnostics:  CT: I have reviewed all pertinent results/findings within the past 24 hours and my personal findings are:  no internal hernia, no mesenteric swirling, gallbladder fluid filled distended, no obvious stones, small hiatal hernia

## 2017-08-15 NOTE — PLAN OF CARE
Problem: Patient Care Overview  Goal: Plan of Care Review  No acute distress noted Safety maintained Will continue to monitor

## 2017-08-15 NOTE — PROVIDER PROGRESS NOTES - EMERGENCY DEPT.
Encounter Date: 8/14/2017    ED Physician Progress Notes        Physician Note:   This patient was signed out to me by Dr. Michaud pending CT of the abdomen and pelvis with oral and IV contrast for greater than one week of epigastric pain.  I had spoken with Dr. Jo earlier tenderness evening who requested imaging as well as admission to medicine even if negative for further workup.  Unlikely internal hernia or other surgical consultation at this point.  Very low suspicion for emergent obstructive process.  Hiatal hernia evident on CT with anastomotic ulcer also possible.  On my examination shortly before admission the patient has minor epigastric tenderness without voluntary or involuntary guarding.  No distention.  No palpable hernias.  I have spoken with hospitalist service who will assume care.  Imaging and laboratory results from this evening reviewed prior to admission.

## 2017-08-15 NOTE — H&P
[facility]  History & Physical    Patient Name: Bessie Elliott  MRN: [id number]  Admission Date: 2017  Attending Physician: Slick Angela MD   Primary Care Provider: SHAY Sanderson         Patient information was obtained from patient and ER records.     Subjective:     Principal Problem:Epigastric pain    Chief Complaint:   Chief Complaint   Patient presents with    Abdominal Pain     Associated nausea and vomiting. Gastric bypass a year ago. Spoke with Dr. Jo and she said he told her he would admit her.         HPI: Ms. Elliott presented to ER last night with abdominal pain.  Location:  Epigastric.  Radiation? No.  Associated with vomiting.  Duration: about one week.  Onset:  Sudden.  Severity: 10/10.  No relief after taking her pain killers at home.  She underwent gastric bypass surgery a year ago.  ER physician last night contacted the surgeon, who requested admission to hospitalist service.    Past Medical History:   Diagnosis Date    Arthritis     bilateral knees and hands    Asthma     Bipolar 1 disorder     Diabetes mellitus     Hiatal hernia     HLD (hyperlipidemia)     S/P gastric bypass 2016    by     Shoulder injury     left shoulder rotator cuff repair       Past Surgical History:   Procedure Laterality Date     SECTION      GASTRIC BYPASS  2016        HYSTERECTOMY      JOINT REPLACEMENT      bilateral knees    ROTATOR CUFF REPAIR Left 2016    TOTAL KNEE ARTHROPLASTY Bilateral 2015       Review of patient's allergies indicates:   Allergen Reactions    Ace inhibitors Other (See Comments)     Cough       No current facility-administered medications on file prior to encounter.      Current Outpatient Prescriptions on File Prior to Encounter   Medication Sig    albuterol (PROVENTIL) 2.5 mg /3 mL (0.083 %) nebulizer solution Take 2.5 mg by nebulization every 6 (six) hours as needed for Wheezing or  Shortness of Breath.     ALBUTEROL SULFATE (VENTOLIN HFA INHL) Inhale into the lungs.    alprazolam (XANAX) 1 MG tablet Take 1 mg by mouth 2 (two) times daily as needed for Anxiety.     dexlansoprazole (DEXILANT) 60 mg capsule Take 60 mg by mouth once daily.    epinastine 0.05 % ophthalmic solution Place 1 drop into both eyes 2 (two) times daily.    ferrous sulfate 325 mg (65 mg iron) Tab tablet Take 1 tablet (325 mg total) by mouth once daily.    fluticasone-salmeterol 250-50 mcg/dose (ADVAIR) 250-50 mcg/dose diskus inhaler Inhale 1 puff into the lungs every morning.     hydrocodone-acetaminophen 10-325mg (NORCO)  mg Tab Take 1 tablet by mouth 2 (two) times daily as needed for Pain.     lamotrigine (LAMICTAL) 150 MG Tab Take 300 mg by mouth every evening.     methocarbamol (ROBAXIN) 500 MG Tab Take 500 mg by mouth 3 (three) times daily as needed.     multivitamin (THERAGRAN) per tablet Take 1 tablet by mouth once daily.    ondansetron (ZOFRAN-ODT) 4 MG TbDL Take 1 tablet (4 mg total) by mouth every 6 (six) hours as needed. (Patient taking differently: Take 4 mg by mouth every 6 (six) hours as needed (nausea). )    promethazine (PHENERGAN) 25 MG tablet Take 25 mg by mouth 2 (two) times daily as needed for Nausea.     sertraline (ZOLOFT) 100 MG tablet Take 150 mg by mouth every evening.     simvastatin (ZOCOR) 20 MG tablet Take 1 tablet (20 mg total) by mouth every evening.    sucralfate (CARAFATE) 100 mg/mL suspension Take 10 mLs (1 g total) by mouth 4 (four) times daily.    sumatriptan (IMITREX) 100 MG tablet TAKE ONE TABLET BY MOUTH EVERY DAY AS DIRECTED. may REPEAT one dose in 2 HOURS if no relief. may start with ONE-HALF TABLET prn migraines    topiramate (TOPAMAX) 200 MG Tab Take 300 mg by mouth 2 (two) times daily.     zolpidem (AMBIEN) 10 mg Tab Take 10 mg by mouth nightly as needed (insomnia).      Family History     Problem Relation (Age of Onset)    Diabetes Mother    Heart disease  Father    Hyperlipidemia Mother    Hypertension Mother, Brother        Social History Main Topics    Smoking status: Never Smoker    Smokeless tobacco: Never Used    Alcohol use No    Drug use: No    Sexual activity: Not on file     Review of Systems   Constitutional: Negative for chills and fever.   Gastrointestinal: Negative for blood in stool, constipation and diarrhea.   All other systems reviewed and are negative.    Objective:     Vital Signs (Most Recent):  Temp: 97.9 °F (36.6 °C) (08/15/17 1100)  Pulse: (!) 53 (08/15/17 1100)  Resp: 20 (08/15/17 1100)  BP: (!) 122/59 (08/15/17 1100)  SpO2: 100 % (08/15/17 1100) Vital Signs (24h Range):  Temp:  [97.5 °F (36.4 °C)-97.9 °F (36.6 °C)] 97.9 °F (36.6 °C)  Pulse:  [50-60] 53  Resp:  [16-20] 20  SpO2:  [100 %] 100 %  BP: (122-152)/(59-80) 122/59     Weight: 76.2 kg (167 lb 15.9 oz)  Body mass index is 27.96 kg/m².    Physical Exam   Constitutional: She is oriented to person, place, and time. No distress.   HENT:   Head: Normocephalic and atraumatic.   Eyes: EOM are normal. Right eye exhibits no discharge. Left eye exhibits no discharge.   Neck: Normal range of motion. No JVD present.   Cardiovascular: Normal rate and regular rhythm.    No murmur heard.  Pulmonary/Chest: Effort normal and breath sounds normal. She exhibits no tenderness.   Abdominal: Soft. Bowel sounds are normal. She exhibits no distension. There is tenderness in the epigastric area.   Musculoskeletal: She exhibits no edema.   Neurological: She is alert and oriented to person, place, and time.   Skin: Skin is warm and dry. No rash noted. She is not diaphoretic.   Psychiatric: She has a normal mood and affect. Her behavior is normal.        Significant Labs: All pertinent labs within the past 24 hours have been reviewed.    Significant Imaging: I have reviewed all pertinent imaging results/findings within the past 24 hours.    Assessment/Plan:     * Epigastric pain    Will consult with   Deysi, her bariatric surgeon, for advice on further workup.  Likely will need endoscopic eval to rule out anastomotic ulcer.  Will provide analgesia and anti-emetics.            VTE Risk Mitigation         Ordered     Low Risk of VTE  Once      08/15/17 0123             Slick Angela MD  Department of Hospital Medicine   [facility]

## 2017-08-16 ENCOUNTER — TELEPHONE (OUTPATIENT)
Dept: SURGERY | Facility: HOSPITAL | Age: 47
End: 2017-08-16

## 2017-08-16 ENCOUNTER — ANESTHESIA EVENT (OUTPATIENT)
Dept: ENDOSCOPY | Facility: HOSPITAL | Age: 47
End: 2017-08-16
Payer: MEDICARE

## 2017-08-16 ENCOUNTER — ANESTHESIA (OUTPATIENT)
Dept: ENDOSCOPY | Facility: HOSPITAL | Age: 47
End: 2017-08-16
Payer: MEDICARE

## 2017-08-16 ENCOUNTER — SURGERY (OUTPATIENT)
Age: 47
End: 2017-08-16

## 2017-08-16 VITALS
RESPIRATION RATE: 20 BRPM | SYSTOLIC BLOOD PRESSURE: 127 MMHG | RESPIRATION RATE: 30 BRPM | WEIGHT: 168 LBS | OXYGEN SATURATION: 98 % | HEIGHT: 65 IN | BODY MASS INDEX: 27.99 KG/M2 | DIASTOLIC BLOOD PRESSURE: 68 MMHG | TEMPERATURE: 98 F | HEART RATE: 46 BPM

## 2017-08-16 DIAGNOSIS — R10.9 ABDOMINAL PAIN, UNSPECIFIED LOCATION: Primary | ICD-10-CM

## 2017-08-16 LAB
ALBUMIN SERPL BCP-MCNC: 3.3 G/DL
ALP SERPL-CCNC: 79 U/L
ALT SERPL W/O P-5'-P-CCNC: 9 U/L
ANION GAP SERPL CALC-SCNC: 7 MMOL/L
AST SERPL-CCNC: 12 U/L
BASOPHILS # BLD AUTO: 0 K/UL
BASOPHILS NFR BLD: 0.3 %
BILIRUB SERPL-MCNC: 0.2 MG/DL
BUN SERPL-MCNC: 9 MG/DL
CALCIUM SERPL-MCNC: 8.6 MG/DL
CHLORIDE SERPL-SCNC: 111 MMOL/L
CO2 SERPL-SCNC: 22 MMOL/L
CREAT SERPL-MCNC: 0.9 MG/DL
DIFFERENTIAL METHOD: ABNORMAL
EOSINOPHIL # BLD AUTO: 0.1 K/UL
EOSINOPHIL NFR BLD: 1.8 %
ERYTHROCYTE [DISTWIDTH] IN BLOOD BY AUTOMATED COUNT: 16.8 %
EST. GFR  (AFRICAN AMERICAN): >60 ML/MIN/1.73 M^2
EST. GFR  (NON AFRICAN AMERICAN): >60 ML/MIN/1.73 M^2
GLUCOSE SERPL-MCNC: 88 MG/DL
HCT VFR BLD AUTO: 30.9 %
HGB BLD-MCNC: 10.2 G/DL
LYMPHOCYTES # BLD AUTO: 2.5 K/UL
LYMPHOCYTES NFR BLD: 48.2 %
MAGNESIUM SERPL-MCNC: 2.2 MG/DL
MCH RBC QN AUTO: 28.5 PG
MCHC RBC AUTO-ENTMCNC: 32.9 G/DL
MCV RBC AUTO: 87 FL
MONOCYTES # BLD AUTO: 0.3 K/UL
MONOCYTES NFR BLD: 6.6 %
NEUTROPHILS # BLD AUTO: 2.3 K/UL
NEUTROPHILS NFR BLD: 43.1 %
PHOSPHATE SERPL-MCNC: 4 MG/DL
PLATELET # BLD AUTO: 202 K/UL
PMV BLD AUTO: 9.4 FL
POTASSIUM SERPL-SCNC: 3.6 MMOL/L
PROT SERPL-MCNC: 6.4 G/DL
RBC # BLD AUTO: 3.57 M/UL
SODIUM SERPL-SCNC: 140 MMOL/L
WBC # BLD AUTO: 5.3 K/UL

## 2017-08-16 PROCEDURE — 36415 COLL VENOUS BLD VENIPUNCTURE: CPT

## 2017-08-16 PROCEDURE — 83735 ASSAY OF MAGNESIUM: CPT

## 2017-08-16 PROCEDURE — 63600175 PHARM REV CODE 636 W HCPCS: Performed by: NURSE ANESTHETIST, CERTIFIED REGISTERED

## 2017-08-16 PROCEDURE — G0378 HOSPITAL OBSERVATION PER HR: HCPCS

## 2017-08-16 PROCEDURE — 84100 ASSAY OF PHOSPHORUS: CPT

## 2017-08-16 PROCEDURE — 25000003 PHARM REV CODE 250: Performed by: INTERNAL MEDICINE

## 2017-08-16 PROCEDURE — 85025 COMPLETE CBC W/AUTO DIFF WBC: CPT

## 2017-08-16 PROCEDURE — D9220A PRA ANESTHESIA: Mod: CRNA,,, | Performed by: NURSE ANESTHETIST, CERTIFIED REGISTERED

## 2017-08-16 PROCEDURE — 94640 AIRWAY INHALATION TREATMENT: CPT

## 2017-08-16 PROCEDURE — 43235 EGD DIAGNOSTIC BRUSH WASH: CPT | Mod: ,,, | Performed by: INTERNAL MEDICINE

## 2017-08-16 PROCEDURE — 63600175 PHARM REV CODE 636 W HCPCS: Performed by: HOSPITALIST

## 2017-08-16 PROCEDURE — 94761 N-INVAS EAR/PLS OXIMETRY MLT: CPT

## 2017-08-16 PROCEDURE — 43235 EGD DIAGNOSTIC BRUSH WASH: CPT | Performed by: INTERNAL MEDICINE

## 2017-08-16 PROCEDURE — D9220A PRA ANESTHESIA: Mod: ANES,,, | Performed by: ANESTHESIOLOGY

## 2017-08-16 PROCEDURE — 37000009 HC ANESTHESIA EA ADD 15 MINS: Performed by: INTERNAL MEDICINE

## 2017-08-16 PROCEDURE — 37000008 HC ANESTHESIA 1ST 15 MINUTES: Performed by: INTERNAL MEDICINE

## 2017-08-16 PROCEDURE — 80053 COMPREHEN METABOLIC PANEL: CPT

## 2017-08-16 PROCEDURE — 25000003 PHARM REV CODE 250: Performed by: NURSE PRACTITIONER

## 2017-08-16 RX ORDER — PROPOFOL 10 MG/ML
VIAL (ML) INTRAVENOUS
Status: DISCONTINUED | OUTPATIENT
Start: 2017-08-16 | End: 2017-08-16

## 2017-08-16 RX ORDER — SUMATRIPTAN 6 MG/.5ML
6 INJECTION, SOLUTION SUBCUTANEOUS ONCE
Status: COMPLETED | OUTPATIENT
Start: 2017-08-16 | End: 2017-08-16

## 2017-08-16 RX ORDER — HYDROCODONE BITARTRATE AND ACETAMINOPHEN 7.5; 325 MG/15ML; MG/15ML
30 SOLUTION ORAL EVERY 4 HOURS PRN
Qty: 473 ML | Refills: 0 | Status: SHIPPED | OUTPATIENT
Start: 2017-08-16 | End: 2017-08-22

## 2017-08-16 RX ORDER — SODIUM CHLORIDE 9 MG/ML
INJECTION, SOLUTION INTRAVENOUS CONTINUOUS
Status: DISCONTINUED | OUTPATIENT
Start: 2017-08-16 | End: 2017-08-16 | Stop reason: HOSPADM

## 2017-08-16 RX ORDER — PROPOFOL 10 MG/ML
INJECTION, EMULSION INTRAVENOUS
Status: DISCONTINUED
Start: 2017-08-16 | End: 2017-08-16 | Stop reason: HOSPADM

## 2017-08-16 RX ORDER — LIDOCAINE HYDROCHLORIDE 20 MG/ML
INJECTION, SOLUTION EPIDURAL; INFILTRATION; INTRACAUDAL; PERINEURAL
Status: DISCONTINUED
Start: 2017-08-16 | End: 2017-08-16 | Stop reason: HOSPADM

## 2017-08-16 RX ORDER — LIDOCAINE HCL/PF 100 MG/5ML
SYRINGE (ML) INTRAVENOUS
Status: DISCONTINUED | OUTPATIENT
Start: 2017-08-16 | End: 2017-08-16

## 2017-08-16 RX ORDER — GLYCOPYRROLATE 0.2 MG/ML
INJECTION INTRAMUSCULAR; INTRAVENOUS
Status: DISCONTINUED
Start: 2017-08-16 | End: 2017-08-16 | Stop reason: HOSPADM

## 2017-08-16 RX ADMIN — ACETAMINOPHEN 1000 MG: 500 TABLET, FILM COATED ORAL at 12:08

## 2017-08-16 RX ADMIN — LIDOCAINE HYDROCHLORIDE 100 MG: 20 INJECTION, SOLUTION INTRAVENOUS at 12:08

## 2017-08-16 RX ADMIN — PROPOFOL 150 MG: 10 INJECTION, EMULSION INTRAVENOUS at 12:08

## 2017-08-16 RX ADMIN — SODIUM CHLORIDE 1000 ML: 0.9 INJECTION, SOLUTION INTRAVENOUS at 11:08

## 2017-08-16 RX ADMIN — SUMATRIPTAN 6 MG: 6 INJECTION, SOLUTION SUBCUTANEOUS at 10:08

## 2017-08-16 RX ADMIN — SUCRALFATE 1 G: 1 SUSPENSION ORAL at 12:08

## 2017-08-16 RX ADMIN — PROPOFOL 50 MG: 10 INJECTION, EMULSION INTRAVENOUS at 12:08

## 2017-08-16 RX ADMIN — ACETAMINOPHEN 1000 MG: 500 TABLET, FILM COATED ORAL at 07:08

## 2017-08-16 NOTE — ANESTHESIA POSTPROCEDURE EVALUATION
"Anesthesia Post Evaluation    Patient: Bessie Elliott    Procedure(s) Performed: Procedure(s) (LRB):  ESOPHAGOGASTRODUODENOSCOPY (EGD) (N/A)    Final Anesthesia Type: general  Patient location during evaluation: PACU  Patient participation: Yes- Able to Participate  Level of consciousness: awake and alert  Post-procedure vital signs: reviewed and stable  Pain management: adequate  Airway patency: patent  PONV status at discharge: No PONV  Anesthetic complications: no      Cardiovascular status: hemodynamically stable  Respiratory status: unassisted and room air  Hydration status: euvolemic  Follow-up not needed.        Visit Vitals  /68   Pulse (!) 46   Temp 36.5 °C (97.7 °F) (Oral)   Resp 20   Ht 5' 5" (1.651 m)   Wt 76.2 kg (167 lb 15.9 oz)   SpO2 98%   Breastfeeding? No   BMI 27.96 kg/m²       Pain/Gerald Score: Pain Assessment Performed: Yes (8/16/2017  1:15 PM)  Presence of Pain: denies (8/16/2017  1:15 PM)  Pain Rating Prior to Med Admin: 8 (8/16/2017  7:25 AM)  Pain Rating Post Med Admin: 0 (8/15/2017  8:28 PM)  Gerald Score: 10 (8/16/2017  1:15 PM)      "

## 2017-08-16 NOTE — ANESTHESIA PREPROCEDURE EVALUATION
08/16/2017  Bessie Elliott is a 46 y.o., female.    Anesthesia Evaluation    I have reviewed the Patient Summary Reports.    I have reviewed the Nursing Notes.   I have reviewed the Medications.     Review of Systems  Anesthesia Hx:  No problems with previous Anesthesia    Hematology/Oncology:  Hematology Normal   Oncology Normal     EENT/Dental:   Upper dentures   Cardiovascular:   hyperlipidemia    Pulmonary:   Asthma    Renal/:  Renal/ Normal     Hepatic/GI:   Hiatal Hernia,    Musculoskeletal:   Arthritis     Neurological:   Neuromuscular Disease,    Endocrine:   Diabetes, type 2    Psych:   Psychiatric History          Physical Exam  General:  Well nourished    Airway/Jaw/Neck:  Airway Findings: Mouth Opening: Normal Tongue: Normal  General Airway Assessment: Adult  Mallampati: I  TM Distance: Normal, at least 6 cm        Eyes/Ears/Nose:  EYES/EARS/NOSE FINDINGS: Normal   Dental:  Dental Findings: Upper Dentures   Chest/Lungs:  Chest/Lungs Findings: Clear to auscultation, Normal Respiratory Rate     Heart/Vascular:  Heart Findings: Rate: Normal  Rhythm: Regular Rhythm  Sounds: Normal  Heart murmur: negative Vascular Findings: Normal    Abdomen:  Abdomen Findings: Normal    Musculoskeletal:  Musculoskeletal Findings: Normal    Mental Status:  Mental Status Findings: Normal        Anesthesia Plan  Type of Anesthesia, risks & benefits discussed:  Anesthesia Type:  general  Patient's Preference:   Intra-op Monitoring Plan: standard ASA monitors  Intra-op Monitoring Plan Comments:   Post Op Pain Control Plan:   Post Op Pain Control Plan Comments:   Induction:   IV  Beta Blocker:  Patient is not currently on a Beta-Blocker (No further documentation required).       Informed Consent: Patient understands risks and agrees with Anesthesia plan.  Questions answered. Anesthesia consent signed with  patient.  ASA Score: 2     Day of Surgery Review of History & Physical: I have interviewed and examined the patient. I have reviewed the patient's H&P dated:    H&P update referred to the provider.         Ready For Surgery From Anesthesia Perspective.

## 2017-08-16 NOTE — TRANSFER OF CARE
"Anesthesia Transfer of Care Note    Patient: Bessie Elliott    Procedure(s) Performed: Procedure(s) (LRB):  ESOPHAGOGASTRODUODENOSCOPY (EGD) (N/A)    Patient location: PACU    Anesthesia Type: general    Transport from OR: Transported from OR on room air with adequate spontaneous ventilation    Post pain: adequate analgesia    Post assessment: no apparent anesthetic complications and tolerated procedure well    Post vital signs: stable    Level of consciousness: awake    Nausea/Vomiting: no nausea/vomiting    Complications: none    Transfer of care protocol was followed      Last vitals:   Visit Vitals  /67   Pulse (!) 50   Temp 36.5 °C (97.7 °F) (Oral)   Resp 17   Ht 5' 5" (1.651 m)   Wt 76.2 kg (167 lb 15.9 oz)   SpO2 100%   Breastfeeding? No   BMI 27.96 kg/m²     "

## 2017-08-16 NOTE — PROGRESS NOTES
EGD done.  Normal findings with postoperative gastric bypass anatomy.  No ulcer or inflammation.  No biopsies taken.  Resume clear liquids and advance as tolerated.

## 2017-08-16 NOTE — NURSING
"Patient is requesting "food"  Stating "Im starving and haven't eaten since Monday".   Patient reminded she is on a clear liquid diet. Patient then states "Oh and my stomach  Hurts".  No distress noted  "

## 2017-08-16 NOTE — CONSULTS
[facility]     CC: Epigastric pain    HPI 46 y.o. female with epigastric pain, onset a couple of months ago, triggered/worsened by eating although not exacerbated by particular foods, with no alleviating factors.  Pain is sharp and burning without concomitant reflux.  No GI bleeding, dysphagia.  She had gastric bypass about a year ago and symptoms appear to be worsening.  She denies change in bowel habits.  She has had an EGD with me in the past in 10/2016 which did not show ulcer at that time.  EGD was done for similar indication then.  She denies any new complaints.  Consult requested by Dr. Jo to evaluate for marginal ulcer.    Past Medical History:   Diagnosis Date    Arthritis     bilateral knees and hands    Asthma     Bipolar 1 disorder     Diabetes mellitus     Hiatal hernia     HLD (hyperlipidemia)     S/P gastric bypass 2016    by     Shoulder injury     left shoulder rotator cuff repair       Past Surgical History:   Procedure Laterality Date     SECTION      GASTRIC BYPASS  2016        HYSTERECTOMY      JOINT REPLACEMENT      bilateral knees    ROTATOR CUFF REPAIR Left 2016    TOTAL KNEE ARTHROPLASTY Bilateral 2015       Social History   Substance Use Topics    Smoking status: Never Smoker    Smokeless tobacco: Never Used    Alcohol use No       Family History   Problem Relation Age of Onset    Diabetes Mother     Hyperlipidemia Mother     Hypertension Mother     Heart disease Father     Hypertension Brother        Review of Systems  General ROS: negative for - chills, fever or weight loss  Psychological ROS: negative for - hallucination, depression or suicidal ideation  Ophthalmic ROS: negative for - blurry vision, photophobia or eye pain  ENT ROS: negative for - epistaxis, sore throat or rhinorrhea  Respiratory ROS: no cough, shortness of breath, or wheezing  Cardiovascular ROS: no chest pain or dyspnea on  "exertion  Gastrointestinal ROS: + epigastric pain  Genito-Urinary ROS: no dysuria, trouble voiding, or hematuria  Musculoskeletal ROS: negative for - arthralgia, myalgia, weakness  Neurological ROS: no syncope or seizures; no ataxia  Dermatological ROS: negative for pruritis, rash and jaundice    Physical Examination  /69 (BP Location: Left arm, Patient Position: Lying)   Pulse 65   Temp 97.8 °F (36.6 °C) (Oral)   Resp 18   Ht 5' 5" (1.651 m)   Wt 76.2 kg (167 lb 15.9 oz)   SpO2 100%   Breastfeeding? No   BMI 27.96 kg/m²   General appearance: alert, cooperative, no distress  HENT: Normocephalic, atraumatic, neck symmetrical, no nasal discharge   Eyes: conjunctivae/corneas clear, PERRL, EOM's intact, sclera anicteric  Lungs: clear to auscultation bilaterally, no dullness to percussion bilaterally, symmetric expansion, breathing unlabored  Heart: regular rate and rhythm without rub; no displacement of the PMI   Abdomen: Soft with TTP in epigastrum.  + BS  Extremities: extremities symmetric; no clubbing, cyanosis, or edema  Integument: Skin color, texture, turgor normal; no rashes; hair distrubution normal, no jaundice  Neurologic: Alert and oriented X 3, no focal sensory or motor neurologic deficits  Psychiatric: no pressured speech; normal affect; no evidence of impaired cognition, no anxiety/depression     Labs:  Lab Results   Component Value Date    WBC 5.20 08/15/2017    HGB 10.1 (L) 08/15/2017    HCT 30.3 (L) 08/15/2017    MCV 86 08/15/2017     08/15/2017       CMP  Sodium   Date Value Ref Range Status   08/15/2017 140 136 - 145 mmol/L Final     Potassium   Date Value Ref Range Status   08/15/2017 3.6 3.5 - 5.1 mmol/L Final     Chloride   Date Value Ref Range Status   08/15/2017 110 95 - 110 mmol/L Final     CO2   Date Value Ref Range Status   08/15/2017 24 23 - 29 mmol/L Final     Glucose   Date Value Ref Range Status   08/15/2017 78 70 - 110 mg/dL Final     BUN, Bld   Date Value Ref Range " Status   08/15/2017 10 6 - 20 mg/dL Final     Creatinine   Date Value Ref Range Status   08/15/2017 0.9 0.5 - 1.4 mg/dL Final     Calcium   Date Value Ref Range Status   08/15/2017 8.7 8.7 - 10.5 mg/dL Final     Total Protein   Date Value Ref Range Status   08/15/2017 6.4 6.0 - 8.4 g/dL Final     Albumin   Date Value Ref Range Status   08/15/2017 3.3 (L) 3.5 - 5.2 g/dL Final     Total Bilirubin   Date Value Ref Range Status   08/15/2017 0.2 0.1 - 1.0 mg/dL Final     Comment:     For infants and newborns, interpretation of results should be based  on gestational age, weight and in agreement with clinical  observations.  Premature Infant recommended reference ranges:  Up to 24 hours.............<8.0 mg/dL  Up to 48 hours............<12.0 mg/dL  3-5 days..................<15.0 mg/dL  6-29 days.................<15.0 mg/dL       Alkaline Phosphatase   Date Value Ref Range Status   08/15/2017 81 55 - 135 U/L Final     AST   Date Value Ref Range Status   08/15/2017 15 10 - 40 U/L Final     ALT   Date Value Ref Range Status   08/15/2017 9 (L) 10 - 44 U/L Final     Anion Gap   Date Value Ref Range Status   08/15/2017 6 (L) 8 - 16 mmol/L Final     eGFR if    Date Value Ref Range Status   08/15/2017 >60 >60 mL/min/1.73 m^2 Final     eGFR if non    Date Value Ref Range Status   08/15/2017 >60 >60 mL/min/1.73 m^2 Final     Comment:     Calculation used to obtain the estimated glomerular filtration  rate (eGFR) is the CKD-EPI equation. Since race is unknown   in our information system, the eGFR values for   -American and Non--American patients are given   for each creatinine result.             Imaging:  Ultrasound and CT scan were independently visualized and reviewed by me and showed findings consistent with gastric reduction surgery.    I have personally reviewed these images    Case discussed with nursing who relates that patient is NPO.      Assessment:   1.  Epigastric pain  2.   History of gastric bypass      Plan:  1.  Continue current care  2.  PPI  3.  Full liquid diet  4.  EGD tomorrow  5.  Further recommendations to follow after above.  6.  Communication will be sent to the referring MD, Dr. Angela regarding my assessment and plan on this patient via EPIC.      Dudley Torres MD  [facility]  [address]  [place] [address]  Office: [phone]  Fax: [phone]

## 2017-08-16 NOTE — SUBJECTIVE & OBJECTIVE
Interval History: pain persists, us done    Medications:  Continuous Infusions:   Scheduled Meds:   ferrous sulfate  325 mg Oral Daily    fluticasone-vilanterol  1 puff Inhalation Daily    lamotrigine  300 mg Oral QHS    multivitamin  1 tablet Oral Daily    senna-docusate 8.6-50 mg  1 tablet Oral BID    sertraline  150 mg Oral QHS    simvastatin  20 mg Oral QHS    sucralfate  1 g Oral QID    topiramate  300 mg Oral BID     PRN Meds:(pyxis) gi cocktail (mylanta 30 mL, lidocaine 2 % viscous 10 mL, dicyclomine 10 mL) 50 mL, acetaminophen, alprazolam, hydrocodone-acetaminophen 10-325mg, magnesium oxide, magnesium oxide, methocarbamol, morphine, ondansetron, potassium chloride 10%, potassium chloride 10%, promethazine (PHENERGAN) IVPB, zolpidem     Review of patient's allergies indicates:   Allergen Reactions    Ace inhibitors Other (See Comments)     Cough     Objective:     Vital Signs (Most Recent):  Temp: 97.7 °F (36.5 °C) (08/16/17 0500)  Pulse: 60 (08/16/17 0500)  Resp: 18 (08/16/17 0500)  BP: (!) 101/59 (08/16/17 0500)  SpO2: 100 % (08/16/17 0500) Vital Signs (24h Range):  Temp:  [97.5 °F (36.4 °C)-97.9 °F (36.6 °C)] 97.7 °F (36.5 °C)  Pulse:  [50-65] 60  Resp:  [16-20] 18  SpO2:  [100 %] 100 %  BP: (101-126)/(59-69) 101/59     Weight: 76.2 kg (167 lb 15.9 oz)  Body mass index is 27.96 kg/m².    Intake/Output - Last 3 Shifts       08/14 0700 - 08/15 0659 08/15 0700 - 08/16 0659 08/16 0700 - 08/17 0659           Urine Occurrence  3 x     Stool Occurrence  1 x           Physical Exam   Constitutional: She is oriented to person, place, and time. She appears well-developed and well-nourished. No distress.   HENT:   Head: Atraumatic.   Eyes: EOM are normal. Right eye exhibits no discharge. Left eye exhibits no discharge.   Neck: Neck supple. No JVD present.   Cardiovascular: Normal rate and regular rhythm.    Pulmonary/Chest: Effort normal and breath sounds normal.   Abdominal: Soft. Bowel sounds are normal.  She exhibits no distension and no mass. There is tenderness. There is no rebound. No hernia.   Musculoskeletal: Normal range of motion. She exhibits no edema or deformity.   Neurological: She is alert and oriented to person, place, and time.   Skin: Skin is warm and dry. Capillary refill takes less than 2 seconds. She is not diaphoretic.       Significant Labs:  CBC:   Recent Labs  Lab 08/16/17  0449   WBC 5.30   RBC 3.57*   HGB 10.2*   HCT 30.9*      MCV 87   MCH 28.5   MCHC 32.9     CMP:   Recent Labs  Lab 08/16/17  0448   GLU 88   CALCIUM 8.6*   ALBUMIN 3.3*   PROT 6.4      K 3.6   CO2 22*   *   BUN 9   CREATININE 0.9   ALKPHOS 79   ALT 9*   AST 12   BILITOT 0.2       Significant Diagnostics:  U/S: I have reviewed all pertinent results/findings within the past 24 hours and my personal findings are:  no gallstones or signs of acute cholecystitis

## 2017-08-16 NOTE — PROGRESS NOTES
[facility] Surgery  Progress Note    Subjective:     History of Present Illness:  45 y/o with a history of gastric bypass presents with 1 week worth of sharp epigastric stabbing pain and tenderness, 10/10, associated with nausea and vomiting. Non radiating, intermittent, no worsening factors no alleviating factors.  Happened 1 month ago, CT negative.  Denies smoking, denies nsaid use.    Post-Op Info:  Procedure(s) (LRB):  ESOPHAGOGASTRODUODENOSCOPY (EGD) (N/A)         Interval History: pain persists, us done    Medications:  Continuous Infusions:   Scheduled Meds:   ferrous sulfate  325 mg Oral Daily    fluticasone-vilanterol  1 puff Inhalation Daily    lamotrigine  300 mg Oral QHS    multivitamin  1 tablet Oral Daily    senna-docusate 8.6-50 mg  1 tablet Oral BID    sertraline  150 mg Oral QHS    simvastatin  20 mg Oral QHS    sucralfate  1 g Oral QID    topiramate  300 mg Oral BID     PRN Meds:(pyxis) gi cocktail (mylanta 30 mL, lidocaine 2 % viscous 10 mL, dicyclomine 10 mL) 50 mL, acetaminophen, alprazolam, hydrocodone-acetaminophen 10-325mg, magnesium oxide, magnesium oxide, methocarbamol, morphine, ondansetron, potassium chloride 10%, potassium chloride 10%, promethazine (PHENERGAN) IVPB, zolpidem     Review of patient's allergies indicates:   Allergen Reactions    Ace inhibitors Other (See Comments)     Cough     Objective:     Vital Signs (Most Recent):  Temp: 97.7 °F (36.5 °C) (08/16/17 0500)  Pulse: 60 (08/16/17 0500)  Resp: 18 (08/16/17 0500)  BP: (!) 101/59 (08/16/17 0500)  SpO2: 100 % (08/16/17 0500) Vital Signs (24h Range):  Temp:  [97.5 °F (36.4 °C)-97.9 °F (36.6 °C)] 97.7 °F (36.5 °C)  Pulse:  [50-65] 60  Resp:  [16-20] 18  SpO2:  [100 %] 100 %  BP: (101-126)/(59-69) 101/59     Weight: 76.2 kg (167 lb 15.9 oz)  Body mass index is 27.96 kg/m².    Intake/Output - Last 3 Shifts       08/14 0700 - 08/15 0659 08/15 0700 - 08/16 0659 08/16 0700 - 08/17 0659            Urine Occurrence  3 x     Stool Occurrence  1 x           Physical Exam   Constitutional: She is oriented to person, place, and time. She appears well-developed and well-nourished. No distress.   HENT:   Head: Atraumatic.   Eyes: EOM are normal. Right eye exhibits no discharge. Left eye exhibits no discharge.   Neck: Neck supple. No JVD present.   Cardiovascular: Normal rate and regular rhythm.    Pulmonary/Chest: Effort normal and breath sounds normal.   Abdominal: Soft. Bowel sounds are normal. She exhibits no distension and no mass. There is tenderness. There is no rebound. No hernia.   Musculoskeletal: Normal range of motion. She exhibits no edema or deformity.   Neurological: She is alert and oriented to person, place, and time.   Skin: Skin is warm and dry. Capillary refill takes less than 2 seconds. She is not diaphoretic.       Significant Labs:  CBC:   Recent Labs  Lab 08/16/17  0449   WBC 5.30   RBC 3.57*   HGB 10.2*   HCT 30.9*      MCV 87   MCH 28.5   MCHC 32.9     CMP:   Recent Labs  Lab 08/16/17 0448   GLU 88   CALCIUM 8.6*   ALBUMIN 3.3*   PROT 6.4      K 3.6   CO2 22*   *   BUN 9   CREATININE 0.9   ALKPHOS 79   ALT 9*   AST 12   BILITOT 0.2       Significant Diagnostics:  U/S: I have reviewed all pertinent results/findings within the past 24 hours and my personal findings are:  no gallstones or signs of acute cholecystitis    Assessment/Plan:     * Epigastric pain    Suspect marginal ulcer vs cholecystitis.  Unlikely internal hernia as she has no signs of obstruction although intermittent internal hernia remains possibility.    RUQ us - negative- will wait for endoscopy and consider hida  GI consult for endoscopy to evaluate for marginal ulcer- continue ppi            Peggy Jo MD  General Surgery  [facility]

## 2017-08-16 NOTE — PLAN OF CARE
Problem: Patient Care Overview  Goal: Plan of Care Review  Outcome: Ongoing (interventions implemented as appropriate)  No acute distress noted Safety maintained Denies any needs or complaints at present Daughter at bedside Will continue to monitor

## 2017-08-16 NOTE — TELEPHONE ENCOUNTER
Left ama secondary to being in a non private room  Will call in rx for pain medicine and set up hida, her pain continues.

## 2017-08-16 NOTE — ASSESSMENT & PLAN NOTE
Suspect marginal ulcer vs cholecystitis.  Unlikely internal hernia as she has no signs of obstruction although intermittent internal hernia remains possibility.    RUQ us - negative- will wait for endoscopy and consider hida  GI consult for endoscopy to evaluate for marginal ulcer- continue ppi

## 2017-08-17 ENCOUNTER — TELEPHONE (OUTPATIENT)
Dept: MEDSURG UNIT | Facility: HOSPITAL | Age: 47
End: 2017-08-17

## 2017-08-18 ENCOUNTER — TELEPHONE (OUTPATIENT)
Dept: SURGERY | Facility: HOSPITAL | Age: 47
End: 2017-08-18

## 2017-08-18 ENCOUNTER — TELEPHONE (OUTPATIENT)
Dept: BARIATRICS | Facility: CLINIC | Age: 47
End: 2017-08-18

## 2017-08-18 ENCOUNTER — HOSPITAL ENCOUNTER (OUTPATIENT)
Dept: RADIOLOGY | Facility: HOSPITAL | Age: 47
Discharge: HOME | End: 2017-08-18
Attending: SURGERY
Payer: MEDICARE

## 2017-08-18 DIAGNOSIS — R10.9 ABDOMINAL PAIN, UNSPECIFIED LOCATION: ICD-10-CM

## 2017-08-18 DIAGNOSIS — R19.5 LOOSE STOOLS: Primary | ICD-10-CM

## 2017-08-18 PROCEDURE — 25000003 PHARM REV CODE 250

## 2017-08-18 PROCEDURE — 78227 HEPATOBIL SYST IMAGE W/DRUG: CPT | Mod: 26,,, | Performed by: RADIOLOGY

## 2017-08-18 PROCEDURE — A9537 TC99M MEBROFENIN: HCPCS

## 2017-08-18 PROCEDURE — 78227 HEPATOBIL SYST IMAGE W/DRUG: CPT | Mod: TC

## 2017-08-18 RX ORDER — COLESEVELAM 180 1/1
1250 TABLET ORAL 2 TIMES DAILY
Qty: 56 TABLET | Refills: 0 | Status: SHIPPED | OUTPATIENT
Start: 2017-08-18 | End: 2018-10-09

## 2017-08-18 RX ADMIN — LONG CHAIN TRIGLYCERIDES 7.5 GRAM-67.5 KCAL/15 ML ORAL EMULSION 114 ML: at 11:08

## 2017-08-18 NOTE — TELEPHONE ENCOUNTER
HIDA normal  Pain persists  -NO ulcer, Not cholecystitis, CT normal- unlikely internal hernia, having flatus  -Some biliary reflux in remnant stomach on hida  Also complains of loose stools  Will check c diff and consider trying cholestyramine

## 2017-08-22 ENCOUNTER — TELEPHONE (OUTPATIENT)
Dept: BARIATRICS | Facility: CLINIC | Age: 47
End: 2017-08-22

## 2017-08-22 ENCOUNTER — TELEPHONE (OUTPATIENT)
Dept: SURGERY | Facility: HOSPITAL | Age: 47
End: 2017-08-22

## 2017-08-22 RX ORDER — HYDROCODONE BITARTRATE AND ACETAMINOPHEN 7.5; 325 MG/1; MG/1
1-2 TABLET ORAL EVERY 4 HOURS PRN
Qty: 40 TABLET | Refills: 0 | Status: SHIPPED | OUTPATIENT
Start: 2017-08-22 | End: 2017-08-22 | Stop reason: SDUPTHER

## 2017-08-22 RX ORDER — HYDROCODONE BITARTRATE AND ACETAMINOPHEN 7.5; 325 MG/1; MG/1
1-2 TABLET ORAL EVERY 4 HOURS PRN
Qty: 40 TABLET | Refills: 0 | Status: ON HOLD | OUTPATIENT
Start: 2017-08-22 | End: 2023-07-12 | Stop reason: SDUPTHER

## 2017-09-13 ENCOUNTER — TELEPHONE (OUTPATIENT)
Dept: BARIATRICS | Facility: CLINIC | Age: 47
End: 2017-09-13

## 2017-09-13 DIAGNOSIS — G89.18 POST-OP PAIN: ICD-10-CM

## 2017-09-13 RX ORDER — ONDANSETRON 4 MG/1
8 TABLET, ORALLY DISINTEGRATING ORAL EVERY 6 HOURS PRN
Qty: 40 TABLET | Refills: 1 | Status: SHIPPED | OUTPATIENT
Start: 2017-09-13 | End: 2018-10-09

## 2017-09-19 ENCOUNTER — LAB VISIT (OUTPATIENT)
Dept: LAB | Facility: HOSPITAL | Age: 47
End: 2017-09-19
Attending: SURGERY
Payer: MEDICARE

## 2017-09-19 DIAGNOSIS — R19.5 LOOSE STOOLS: ICD-10-CM

## 2017-09-19 LAB
C DIFF GDH STL QL: NEGATIVE
C DIFF TOX A+B STL QL IA: NEGATIVE

## 2017-09-19 PROCEDURE — 87449 NOS EACH ORGANISM AG IA: CPT

## 2017-11-08 RX ORDER — SIMVASTATIN 20 MG/1
20 TABLET, FILM COATED ORAL NIGHTLY
Qty: 90 TABLET | Refills: 3 | Status: SHIPPED | OUTPATIENT
Start: 2017-11-08 | End: 2019-01-03 | Stop reason: SDUPTHER

## 2018-03-05 DIAGNOSIS — R11.10 VOMITING, INTRACTABILITY OF VOMITING NOT SPECIFIED, PRESENCE OF NAUSEA NOT SPECIFIED, UNSPECIFIED VOMITING TYPE: Primary | ICD-10-CM

## 2018-03-19 ENCOUNTER — HOSPITAL ENCOUNTER (OUTPATIENT)
Dept: RADIOLOGY | Facility: HOSPITAL | Age: 48
Discharge: HOME | End: 2018-03-19
Attending: SURGERY
Payer: MEDICARE

## 2018-03-19 DIAGNOSIS — R11.10 VOMITING, INTRACTABILITY OF VOMITING NOT SPECIFIED, PRESENCE OF NAUSEA NOT SPECIFIED, UNSPECIFIED VOMITING TYPE: ICD-10-CM

## 2018-03-19 PROCEDURE — 74245 FL UPPER GI WITH SMALL BOWEL: CPT | Mod: 26,,, | Performed by: RADIOLOGY

## 2018-03-19 PROCEDURE — 74245 FL UPPER GI WITH SMALL BOWEL: CPT | Mod: TC,FY

## 2018-10-02 ENCOUNTER — TELEPHONE (OUTPATIENT)
Dept: BARIATRICS | Facility: CLINIC | Age: 48
End: 2018-10-02

## 2018-10-09 ENCOUNTER — OFFICE VISIT (OUTPATIENT)
Dept: BARIATRICS | Facility: CLINIC | Age: 48
End: 2018-10-09
Payer: MEDICARE

## 2018-10-09 VITALS
DIASTOLIC BLOOD PRESSURE: 73 MMHG | TEMPERATURE: 98 F | WEIGHT: 142.38 LBS | HEART RATE: 69 BPM | SYSTOLIC BLOOD PRESSURE: 126 MMHG | HEIGHT: 65 IN | BODY MASS INDEX: 23.72 KG/M2

## 2018-10-09 DIAGNOSIS — E53.8 B12 DEFICIENCY: ICD-10-CM

## 2018-10-09 DIAGNOSIS — E61.1 IRON DEFICIENCY: ICD-10-CM

## 2018-10-09 DIAGNOSIS — R10.13 EPIGASTRIC PAIN: Primary | ICD-10-CM

## 2018-10-09 DIAGNOSIS — E11.9 TYPE 2 DIABETES MELLITUS WITHOUT COMPLICATION, WITHOUT LONG-TERM CURRENT USE OF INSULIN: ICD-10-CM

## 2018-10-09 DIAGNOSIS — E55.9 VITAMIN D DEFICIENCY: ICD-10-CM

## 2018-10-09 PROCEDURE — 99999 PR PBB SHADOW E&M-EST. PATIENT-LVL III: CPT | Mod: PBBFAC,,, | Performed by: SURGERY

## 2018-10-09 PROCEDURE — 99213 OFFICE O/P EST LOW 20 MIN: CPT | Mod: S$PBB,,, | Performed by: SURGERY

## 2018-10-09 PROCEDURE — 3078F DIAST BP <80 MM HG: CPT | Mod: CPTII,,, | Performed by: SURGERY

## 2018-10-09 PROCEDURE — 3008F BODY MASS INDEX DOCD: CPT | Mod: CPTII,,, | Performed by: SURGERY

## 2018-10-09 PROCEDURE — 3074F SYST BP LT 130 MM HG: CPT | Mod: CPTII,,, | Performed by: SURGERY

## 2018-10-09 PROCEDURE — 99213 OFFICE O/P EST LOW 20 MIN: CPT | Mod: PBBFAC,PN | Performed by: SURGERY

## 2018-10-09 RX ORDER — DEXTROAMPHETAMINE SACCHARATE, AMPHETAMINE ASPARTATE, DEXTROAMPHETAMINE SULFATE AND AMPHETAMINE SULFATE 5; 5; 5; 5 MG/1; MG/1; MG/1; MG/1
20 TABLET ORAL DAILY
COMMUNITY

## 2018-10-09 RX ORDER — TRAZODONE HYDROCHLORIDE 100 MG/1
100 TABLET ORAL NIGHTLY
Refills: 0 | COMMUNITY
Start: 2018-09-17 | End: 2022-05-06

## 2018-10-09 RX ORDER — ESTRADIOL 1 MG/1
1 TABLET ORAL DAILY
Refills: 11 | COMMUNITY
Start: 2018-09-17

## 2018-10-09 RX ORDER — CYANOCOBALAMIN 1000 UG/ML
1000 INJECTION, SOLUTION INTRAMUSCULAR; SUBCUTANEOUS
COMMUNITY

## 2018-10-09 RX ORDER — DEXTROAMPHETAMINE SACCHARATE, AMPHETAMINE ASPARTATE MONOHYDRATE, DEXTROAMPHETAMINE SULFATE AND AMPHETAMINE SULFATE 7.5; 7.5; 7.5; 7.5 MG/1; MG/1; MG/1; MG/1
30 CAPSULE, EXTENDED RELEASE ORAL EVERY MORNING
Refills: 0 | COMMUNITY
Start: 2018-09-26

## 2018-10-09 RX ORDER — ONDANSETRON 4 MG/1
4 TABLET, ORALLY DISINTEGRATING ORAL EVERY 6 HOURS PRN
Qty: 30 TABLET | Refills: 2 | Status: SHIPPED | OUTPATIENT
Start: 2018-10-09 | End: 2019-01-31 | Stop reason: SDUPTHER

## 2018-10-09 NOTE — PROGRESS NOTES
Post Op Note    Surgery: gastric bypass surgery  Date: 5/16/16  Initial weight: 248  Last weight: 168  Current weight: 142    Reflux: senses some heartburn  Vomiting: still gets nausea    Diet:  Breakfast:  Skips mostly or will do sausage, eggs, cheese  Lunch: sometimes skips or will do very small meats and veggies  Dinner: small portions of soup potato, some meats  Snack: chocolate covered raisins  Protein shake: none    No more sodas    Exercise:  none    MVI: taking flintstones mvi    Vitals:    10/09/18 0846   BP: 126/73   Pulse: 69   Temp: 97.9 °F (36.6 °C)       Body comp:  Fat Percent:  29.8 %  Fat Mass:  42.4 lb  FFM:  100 lb  TBW: 69.4 lb  TBW %:  48.7 %  BMR: 1347 kcal    PE:  NAD  RRR  Soft/nt/nd  Incisions: well healed    Labs: none    A/P: s/p gastric bypass     Hx of gastric bypass still having epigastric pain, may be related to hiatal hernia see on UGI or just her anatomy with a gastric bypass. She has had a marginal ulcer in the past and will send her to see gi.    Check labs    Refill zofran    1. Epigastric pain  CBC w/ Auto Differential    CMP    B1    Ambulatory Referral to Gastroenterology   2. Type 2 diabetes mellitus without complication, without long-term current use of insulin  Lipid Panel    Hemoglobin A1c    off all meds   3. Iron deficiency  Iron Studies   4. Vitamin D deficiency  Vitamin D 25 Hydroxy   5. B12 deficiency  B12       -All above: Evaluated, monitored, and treated with diet and exercise program.        : I met with the patient for 15 minutes and counseled her for over 50% of that time

## 2018-10-16 ENCOUNTER — LAB VISIT (OUTPATIENT)
Dept: LAB | Facility: HOSPITAL | Age: 48
End: 2018-10-16
Attending: SURGERY
Payer: MEDICARE

## 2018-10-16 DIAGNOSIS — E55.9 VITAMIN D DEFICIENCY: ICD-10-CM

## 2018-10-16 DIAGNOSIS — R10.13 EPIGASTRIC PAIN: ICD-10-CM

## 2018-10-16 DIAGNOSIS — E11.9 TYPE 2 DIABETES MELLITUS WITHOUT COMPLICATION, WITHOUT LONG-TERM CURRENT USE OF INSULIN: ICD-10-CM

## 2018-10-16 DIAGNOSIS — E53.8 B12 DEFICIENCY: ICD-10-CM

## 2018-10-16 DIAGNOSIS — E61.1 IRON DEFICIENCY: ICD-10-CM

## 2018-10-16 LAB
25(OH)D3+25(OH)D2 SERPL-MCNC: 38 NG/ML
ALBUMIN SERPL BCP-MCNC: 4 G/DL
ALP SERPL-CCNC: 74 U/L
ALT SERPL W/O P-5'-P-CCNC: 9 U/L
ANION GAP SERPL CALC-SCNC: 7 MMOL/L
AST SERPL-CCNC: 13 U/L
BASOPHILS # BLD AUTO: 0 K/UL
BASOPHILS NFR BLD: 0.4 %
BILIRUB SERPL-MCNC: 0.5 MG/DL
BUN SERPL-MCNC: 10 MG/DL
CALCIUM SERPL-MCNC: 9.1 MG/DL
CHLORIDE SERPL-SCNC: 109 MMOL/L
CHOLEST SERPL-MCNC: 163 MG/DL
CHOLEST/HDLC SERPL: 2.5 {RATIO}
CO2 SERPL-SCNC: 25 MMOL/L
CREAT SERPL-MCNC: 0.9 MG/DL
DIFFERENTIAL METHOD: ABNORMAL
EOSINOPHIL # BLD AUTO: 0 K/UL
EOSINOPHIL NFR BLD: 0.6 %
ERYTHROCYTE [DISTWIDTH] IN BLOOD BY AUTOMATED COUNT: 17 %
EST. GFR  (AFRICAN AMERICAN): >60 ML/MIN/1.73 M^2
EST. GFR  (NON AFRICAN AMERICAN): >60 ML/MIN/1.73 M^2
ESTIMATED AVG GLUCOSE: 100 MG/DL
GLUCOSE SERPL-MCNC: 88 MG/DL
HBA1C MFR BLD HPLC: 5.1 %
HCT VFR BLD AUTO: 34.7 %
HDLC SERPL-MCNC: 65 MG/DL
HDLC SERPL: 39.9 %
HGB BLD-MCNC: 11.3 G/DL
IRON SERPL-MCNC: 55 UG/DL
LDLC SERPL CALC-MCNC: 80.6 MG/DL
LYMPHOCYTES # BLD AUTO: 1.9 K/UL
LYMPHOCYTES NFR BLD: 44.3 %
MCH RBC QN AUTO: 29 PG
MCHC RBC AUTO-ENTMCNC: 32.6 G/DL
MCV RBC AUTO: 89 FL
MONOCYTES # BLD AUTO: 0.2 K/UL
MONOCYTES NFR BLD: 5.5 %
NEUTROPHILS # BLD AUTO: 2.1 K/UL
NEUTROPHILS NFR BLD: 49.2 %
NONHDLC SERPL-MCNC: 98 MG/DL
PLATELET # BLD AUTO: 244 K/UL
PMV BLD AUTO: 9.1 FL
POTASSIUM SERPL-SCNC: 3.8 MMOL/L
PROT SERPL-MCNC: 7.4 G/DL
RBC # BLD AUTO: 3.91 M/UL
SATURATED IRON: 11 %
SODIUM SERPL-SCNC: 141 MMOL/L
TOTAL IRON BINDING CAPACITY: 480 UG/DL
TRANSFERRIN SERPL-MCNC: 324 MG/DL
TRIGL SERPL-MCNC: 87 MG/DL
VIT B12 SERPL-MCNC: 389 PG/ML
WBC # BLD AUTO: 4.3 K/UL

## 2018-10-16 PROCEDURE — 83036 HEMOGLOBIN GLYCOSYLATED A1C: CPT

## 2018-10-16 PROCEDURE — 80053 COMPREHEN METABOLIC PANEL: CPT

## 2018-10-16 PROCEDURE — 84425 ASSAY OF VITAMIN B-1: CPT

## 2018-10-16 PROCEDURE — 85025 COMPLETE CBC W/AUTO DIFF WBC: CPT

## 2018-10-16 PROCEDURE — 82607 VITAMIN B-12: CPT

## 2018-10-16 PROCEDURE — 83540 ASSAY OF IRON: CPT

## 2018-10-16 PROCEDURE — 82306 VITAMIN D 25 HYDROXY: CPT

## 2018-10-16 PROCEDURE — 80061 LIPID PANEL: CPT

## 2018-10-19 LAB — VIT B1 SERPL-MCNC: 34 UG/L (ref 38–122)

## 2018-12-12 ENCOUNTER — INITIAL CONSULT (OUTPATIENT)
Dept: GASTROENTEROLOGY | Facility: CLINIC | Age: 48
End: 2018-12-12
Payer: MEDICARE

## 2018-12-12 VITALS
BODY MASS INDEX: 24.1 KG/M2 | SYSTOLIC BLOOD PRESSURE: 117 MMHG | HEIGHT: 65 IN | WEIGHT: 144.63 LBS | HEART RATE: 69 BPM | DIASTOLIC BLOOD PRESSURE: 78 MMHG

## 2018-12-12 DIAGNOSIS — D50.9 IRON DEFICIENCY ANEMIA, UNSPECIFIED IRON DEFICIENCY ANEMIA TYPE: ICD-10-CM

## 2018-12-12 DIAGNOSIS — R10.13 EPIGASTRIC PAIN: Primary | ICD-10-CM

## 2018-12-12 DIAGNOSIS — Z98.84 HISTORY OF GASTRIC BYPASS: ICD-10-CM

## 2018-12-12 DIAGNOSIS — K21.9 GASTROESOPHAGEAL REFLUX DISEASE, ESOPHAGITIS PRESENCE NOT SPECIFIED: ICD-10-CM

## 2018-12-12 PROCEDURE — 3008F BODY MASS INDEX DOCD: CPT | Mod: CPTII,S$GLB,, | Performed by: INTERNAL MEDICINE

## 2018-12-12 PROCEDURE — 3074F SYST BP LT 130 MM HG: CPT | Mod: CPTII,S$GLB,, | Performed by: INTERNAL MEDICINE

## 2018-12-12 PROCEDURE — 99999 PR PBB SHADOW E&M-EST. PATIENT-LVL III: CPT | Mod: PBBFAC,,, | Performed by: INTERNAL MEDICINE

## 2018-12-12 PROCEDURE — 99214 OFFICE O/P EST MOD 30 MIN: CPT | Mod: S$GLB,,, | Performed by: INTERNAL MEDICINE

## 2018-12-12 PROCEDURE — 3078F DIAST BP <80 MM HG: CPT | Mod: CPTII,S$GLB,, | Performed by: INTERNAL MEDICINE

## 2018-12-12 RX ORDER — PANTOPRAZOLE SODIUM 20 MG/1
20 TABLET, DELAYED RELEASE ORAL DAILY
Qty: 90 TABLET | Refills: 3 | Status: SHIPPED | OUTPATIENT
Start: 2018-12-12 | End: 2019-10-08 | Stop reason: ALTCHOICE

## 2018-12-12 NOTE — H&P (VIEW-ONLY)
"Subjective:       Patient ID: Bessie Elliott is a 48 y.o. female.    This is an established patient.      Chief Complaint: Abdominal Pain (burning)    Gastroesophageal Reflux   She complains of abdominal pain (epigastric) and heartburn. She reports no chest pain, no coughing, no nausea, no sore throat or no wheezing. This is a chronic problem. The current episode started more than 1 month ago. The problem occurs frequently. The problem has been gradually worsening. The heartburn duration is several minutes. The heartburn is of moderate intensity. The heartburn wakes her from sleep. The heartburn does not limit her activity. The heartburn changes (worse with lying down and worse later in the day/at night) with position. The symptoms are aggravated by lying down. Associated symptoms include anemia (iron deficient). Pertinent negatives include no fatigue, melena or weight loss. There are no known risk factors. She has tried a PPI (once daily in AM) for the symptoms. The treatment provided mild relief. Past procedures include an EGD (showed post surgical anatomy but no ulcer or inflammation).   She relates that she has not had a colonoscopy in about 15 years.    Review of Systems   Constitutional: Negative for chills, fatigue, fever and weight loss.   HENT: Negative for sore throat and trouble swallowing.    Respiratory: Negative for cough, shortness of breath and wheezing.    Cardiovascular: Negative for chest pain and palpitations.   Gastrointestinal: Positive for abdominal pain (epigastric) and heartburn. Negative for blood in stool, melena, nausea and vomiting.   All other systems reviewed and are negative.      Objective:       Vitals:    12/12/18 1324   BP: 117/78   Pulse: 69   Weight: 65.6 kg (144 lb 10 oz)   Height: 5' 5" (1.651 m)       Physical Exam   Constitutional: She is oriented to person, place, and time. She appears well-developed and well-nourished.   HENT:   Head: Normocephalic and atraumatic. "   Eyes: Pupils are equal, round, and reactive to light. No scleral icterus.   Cardiovascular: Normal rate and regular rhythm.   No murmur heard.  Pulmonary/Chest: Effort normal and breath sounds normal. She has no wheezes.   Abdominal: Soft. Bowel sounds are normal. She exhibits no distension. There is no tenderness.   Neurological: She is alert and oriented to person, place, and time.         Lab Results   Component Value Date    WBC 4.30 10/16/2018    HGB 11.3 (L) 10/16/2018    HCT 34.7 (L) 10/16/2018    MCV 89 10/16/2018     10/16/2018         Assessment:       1. Epigastric pain    2. Gastroesophageal reflux disease, esophagitis presence not specified    3. History of gastric bypass    4. Iron deficiency anemia, unspecified iron deficiency anemia type        Plan:       1.  Continue dexilant in AM  2.  Add evening PPI before dinner as most of her breakthrough symptoms seem to be at night  3.  Colonoscopy for STARR  4.  Avoid NSAIDs  5.  Further recommendations to follow after above.  6.  Antireflux precautions including avoidance of late night eating and lying down soon after eating.

## 2018-12-12 NOTE — LETTER
December 12, 2018      Peggy Jo MD  [address]  [address]  [place] [address]           [facility]  [address]  [place] [address]  Phone: [phone]          Patient: Bessie Elliott   MR Number: [id number]   YOB: 1970   Date of Visit: 12/12/2018       Dear Dr. Peggy Jo:    Thank you for referring Bessie Elliott to me for evaluation. Attached you will find relevant portions of my assessment and plan of care.    If you have questions, please do not hesitate to call me. I look forward to following Bessie Elliott along with you.    Sincerely,    Dudley Araiza MD    Enclosure  CC:  No Recipients    If you would like to receive this communication electronically, please contact [email or web address] or [phone] to request more information on "[employer] Link access.    For providers and/or their staff who would like to refer a patient to [facility], please contact us through our one-stop-shop provider referral line, [facility], at [phone].    If you feel you have received this communication in error or would no longer like to receive these types of communications, please e-mail [email or web address]

## 2019-01-07 RX ORDER — SIMVASTATIN 20 MG/1
20 TABLET, FILM COATED ORAL NIGHTLY
Qty: 90 TABLET | Refills: 3 | Status: SHIPPED | OUTPATIENT
Start: 2019-01-07 | End: 2022-08-08

## 2019-01-08 ENCOUNTER — HOSPITAL ENCOUNTER (OUTPATIENT)
Facility: HOSPITAL | Age: 49
Discharge: HOME | End: 2019-01-08
Attending: INTERNAL MEDICINE | Admitting: INTERNAL MEDICINE
Payer: MEDICARE

## 2019-01-08 ENCOUNTER — ANESTHESIA EVENT (OUTPATIENT)
Dept: ENDOSCOPY | Facility: HOSPITAL | Age: 49
End: 2019-01-08
Payer: MEDICARE

## 2019-01-08 ENCOUNTER — ANESTHESIA (OUTPATIENT)
Dept: ENDOSCOPY | Facility: HOSPITAL | Age: 49
End: 2019-01-08
Payer: MEDICARE

## 2019-01-08 DIAGNOSIS — D50.9 IRON (FE) DEFICIENCY ANEMIA: ICD-10-CM

## 2019-01-08 PROCEDURE — 37000009 HC ANESTHESIA EA ADD 15 MINS: Performed by: INTERNAL MEDICINE

## 2019-01-08 PROCEDURE — 45378 DIAGNOSTIC COLONOSCOPY: CPT | Performed by: INTERNAL MEDICINE

## 2019-01-08 PROCEDURE — D9220A PRA ANESTHESIA: ICD-10-PCS | Mod: CRNA,,, | Performed by: NURSE ANESTHETIST, CERTIFIED REGISTERED

## 2019-01-08 PROCEDURE — 63600175 PHARM REV CODE 636 W HCPCS: Performed by: NURSE ANESTHETIST, CERTIFIED REGISTERED

## 2019-01-08 PROCEDURE — 37000008 HC ANESTHESIA 1ST 15 MINUTES: Performed by: INTERNAL MEDICINE

## 2019-01-08 PROCEDURE — D9220A PRA ANESTHESIA: Mod: ANES,,, | Performed by: ANESTHESIOLOGY

## 2019-01-08 PROCEDURE — 00813 ANES UPR LWR GI NDSC PX: CPT | Performed by: INTERNAL MEDICINE

## 2019-01-08 PROCEDURE — D9220A PRA ANESTHESIA: Mod: CRNA,,, | Performed by: NURSE ANESTHETIST, CERTIFIED REGISTERED

## 2019-01-08 PROCEDURE — 25000003 PHARM REV CODE 250: Performed by: INTERNAL MEDICINE

## 2019-01-08 PROCEDURE — 43235 PR EGD, FLEX, DIAGNOSTIC: ICD-10-PCS | Mod: 51,,, | Performed by: INTERNAL MEDICINE

## 2019-01-08 PROCEDURE — 43235 EGD DIAGNOSTIC BRUSH WASH: CPT | Mod: 51,,, | Performed by: INTERNAL MEDICINE

## 2019-01-08 PROCEDURE — 45378 PR COLONOSCOPY,DIAGNOSTIC: ICD-10-PCS | Mod: ,,, | Performed by: INTERNAL MEDICINE

## 2019-01-08 PROCEDURE — 43235 EGD DIAGNOSTIC BRUSH WASH: CPT | Performed by: INTERNAL MEDICINE

## 2019-01-08 PROCEDURE — 45378 DIAGNOSTIC COLONOSCOPY: CPT | Mod: ,,, | Performed by: INTERNAL MEDICINE

## 2019-01-08 PROCEDURE — D9220A PRA ANESTHESIA: ICD-10-PCS | Mod: ANES,,, | Performed by: ANESTHESIOLOGY

## 2019-01-08 RX ORDER — PROPOFOL 10 MG/ML
INJECTION, EMULSION INTRAVENOUS
Status: COMPLETED
Start: 2019-01-08 | End: 2019-01-08

## 2019-01-08 RX ORDER — LIDOCAINE HCL/PF 100 MG/5ML
SYRINGE (ML) INTRAVENOUS
Status: DISCONTINUED | OUTPATIENT
Start: 2019-01-08 | End: 2019-01-08

## 2019-01-08 RX ORDER — SODIUM CHLORIDE 9 MG/ML
INJECTION, SOLUTION INTRAVENOUS CONTINUOUS
Status: DISCONTINUED | OUTPATIENT
Start: 2019-01-08 | End: 2019-01-08 | Stop reason: HOSPADM

## 2019-01-08 RX ORDER — LIDOCAINE HYDROCHLORIDE 20 MG/ML
INJECTION, SOLUTION EPIDURAL; INFILTRATION; INTRACAUDAL; PERINEURAL
Status: DISCONTINUED
Start: 2019-01-08 | End: 2019-01-08 | Stop reason: HOSPADM

## 2019-01-08 RX ORDER — PROPOFOL 10 MG/ML
INJECTION, EMULSION INTRAVENOUS
Status: DISCONTINUED | OUTPATIENT
Start: 2019-01-08 | End: 2019-01-08

## 2019-01-08 RX ADMIN — PROPOFOL 50 MG: 10 INJECTION, EMULSION INTRAVENOUS at 11:01

## 2019-01-08 RX ADMIN — LIDOCAINE HYDROCHLORIDE 100 MG: 20 INJECTION, SOLUTION INTRAVENOUS at 11:01

## 2019-01-08 RX ADMIN — PROPOFOL 100 MG: 10 INJECTION, EMULSION INTRAVENOUS at 11:01

## 2019-01-08 RX ADMIN — SODIUM CHLORIDE: 0.9 INJECTION, SOLUTION INTRAVENOUS at 09:01

## 2019-01-08 NOTE — INTERVAL H&P NOTE
The patient has been examined and the H&P has been reviewed:    I concur with the findings and changes have been noted since the H&P was written: Patient now complains of scant hematemesis for the last couple of weeks and has history of gastric bypass.    Anesthesia/Surgery risks, benefits and alternative options discussed and understood by patient/family.          Active Hospital Problems    Diagnosis  POA    Iron (Fe) deficiency anemia [D50.9]  Yes      Resolved Hospital Problems   No resolved problems to display.

## 2019-01-08 NOTE — OR NURSING
Pt. Awake and alert, vital signs stable.  Tolerated liquids without nausea.  Denies pain. Ambulates readily to the bathroom.  IV removed per policy, catheter intact. Discharge instructions reviewed with patient and her friend, and written instructions given to patient, who verbalized understanding.  Dr. Araiza  spoke with pt. And answered questions,  and states pt. Is ready to discharge. Discharge home with family per wheelchair to lobby.

## 2019-01-08 NOTE — TRANSFER OF CARE
"Anesthesia Transfer of Care Note    Patient: Bessie Elliott    Procedure(s) Performed: Procedure(s) (LRB):  COLONOSCOPY (N/A)  EGD (ESOPHAGOGASTRODUODENOSCOPY) (N/A)    Patient location: PACU    Anesthesia Type: general    Transport from OR: Transported from OR on 2-3 L/min O2 by NC with adequate spontaneous ventilation    Post pain: adequate analgesia    Post assessment: no apparent anesthetic complications and tolerated procedure well    Post vital signs: stable    Level of consciousness: sedated and responds to stimulation    Nausea/Vomiting: no nausea/vomiting    Complications: none    Transfer of care protocol was followed      Last vitals:   Visit Vitals  /67   Pulse (!) 59   Temp 36.8 °C (98.2 °F) (Tympanic)   Resp 18   Ht 5' 5" (1.651 m)   Wt 65.3 kg (144 lb)   SpO2 100%   Breastfeeding? No   BMI 23.96 kg/m²     "

## 2019-01-08 NOTE — [employer] PATIENT INSTRUCTIONS
Discharge Summary/Instructions after an Endoscopic Procedure  Patient Name: Bessie Elliott  Patient MRN: [id number]  Patient YOB: 1970 Tuesday, January 08, 2019  Dudley Torres MD  RESTRICTIONS:  During your procedure today, you received medications for sedation.  These   medications may affect your judgment, balance and coordination.  Therefore,   for 24 hours, you have the following restrictions:   - DO NOT drive a car, operate machinery, make legal/financial decisions,   sign important papers or drink alcohol.    ACTIVITY:  Today: no heavy lifting, straining or running due to procedural   sedation/anesthesia.  The following day: return to full activity including work.  DIET:  Eat and drink normally unless instructed otherwise.     TREATMENT FOR COMMON SIDE EFFECTS:  - Mild abdominal pain, nausea, belching, bloating or excessive gas:  rest,   eat lightly and use a heating pad.  - Sore Throat: treat with throat lozenges and/or gargle with warm salt   water.  - Because air was used during the procedure, expelling large amounts of air   from your rectum or belching is normal.  - If a bowel prep was taken, you may not have a bowel movement for 1-3 days.    This is normal.  SYMPTOMS TO WATCH FOR AND REPORT TO YOUR PHYSICIAN:  1. Abdominal pain or bloating, other than gas cramps.  2. Chest pain.  3. Back pain.  4. Signs of infection such as: chills or fever occurring within 24 hours   after the procedure.  5. Rectal bleeding, which would show as bright red, maroon, or black stools.   (A tablespoon of blood from the rectum is not serious, especially if   hemorrhoids are present.)  6. Vomiting.  7. Weakness or dizziness.  GO DIRECTLY TO THE NEAREST EMERGENCY ROOM IF YOU HAVE ANY OF THE FOLLOWING:      Difficulty breathing              Chills and/or fever over 101 F   Persistent vomiting and/or vomiting blood   Severe abdominal pain   Severe chest pain   Black, tarry stools   Bleeding- more than one  tablespoon   Any other symptom or condition that you feel may need urgent attention  Your doctor recommends these additional instructions:  If any biopsies were taken, your doctors clinic will contact you in 1 to 2   weeks with any results.  - Patient has a contact number available for emergencies.  The signs and   symptoms of potential delayed complications were discussed with the   patient.  Return to normal activities tomorrow.  Written discharge   instructions were provided to the patient.   - High fiber diet.   - Continue present medications.   - No aspirin, ibuprofen, naproxen, or other non-steroidal anti-inflammatory   drugs.   - Repeat colonoscopy in 10 years for screening purposes.   - Discharge patient to home (ambulatory).   - Return to my office PRN.   - To visualize the small bowel, perform video capsule endoscopy at   appointment to be scheduled.  For questions, problems or results please call your physician - Dudley Torres MD at Work:  [phone].  [facility], EMERGENCY ROOM PHONE NUMBER: [phone]  IF A COMPLICATION OR EMERGENCY SITUATION ARISES AND YOU ARE UNABLE TO REACH   YOUR PHYSICIAN - GO DIRECTLY TO THE EMERGENCY ROOM.  Dudley Torres MD  1/8/2019 11:45:31 AM  This report has been verified and signed electronically.  [employer]

## 2019-01-08 NOTE — PLAN OF CARE
Have you had a colonoscopy LESS THAN 3 years ago?   * If YES, answer these questions*: NO    1. Did patient have a prior colonic polyp in a previous surveillance/diagnostic colonoscopy and is 18 years or older on date of encounter?  2. Documentation of < 3 year interval since the patients last colonoscopy due to medical reasons (eg., last colonoscopy incomplete, last colonoscopy had inadequate prep, piecemeal removal of adenomas, or last colonoscopy found > 10 adenomas) ?

## 2019-01-08 NOTE — DISCHARGE INSTRUCTIONS
Hemorrhoids    Hemorrhoids are swollen and inflamed veins inside the rectum and near the anus. The rectum is the last several inches of the colon. The anus is the passage between the rectum and the outside of the body.  Causes  The veins can become swollen due to increased pressure in them. This is most often caused by:  · Chronic constipation or diarrhea  · Straining when having a bowel movement  · Sitting too long on the toilet  · A low-fiber diet  · Pregnancy  Symptoms  · Bleeding from the rectum (this may be noticeable after bowel movements)  · Lump near the anus  · Itching around the anus  · Pain around the anus  There are different types of hemorrhoids. Depending on the type you have and the severity, you may be able to treat yourself at home. In some cases, a procedure may be the best treatment option. Your healthcare provider can tell you more about this, if needed.  Home care  General care  · To get relief from pain or itching, try:  ¨ Topical products. Your healthcare provider may prescribe or recommend creams, ointments, or pads that can be applied to the hemorrhoid. Use these exactly as directed.  ¨ Medicines. Your healthcare provider may recommend stool softeners, suppositories, or laxatives to help manage constipation. Use these exactly as directed.  ¨ Sitz baths. A sitz bath involves sitting in a few inches of warm bath water. Be careful not to make the water so hot that you burn yourself--test it before sitting in it. Soak for about 10 to 15 minutes a few times a day. This may help relieve pain.  Tips to help prevent hemorrhoids  · Eat more fiber. Fiber adds bulk to stool and absorbs water as it moves through your colon. This makes stool softer and easier to pass.  ¨ Increase the fiber in your diet with more fiber-rich foods. These include fresh fruit, vegetables, and whole grains.  ¨ Take a fiber supplement or bulking agent, if advised to by your provider. These include products such as psyllium  or methylcellulose.  · Drink plenty of water, if directed to by your provider. This can help keep stool soft.  · Be more active. Frequent exercise aids digestion and helps prevent constipation. It may also help make bowel movements more regular.  · Dont strain during bowel movements. This can make hemorrhoids more likely. Also, dont sit on the toilet for long periods of time.  Follow-up care  Follow up with your healthcare provider, or as advised. If a culture or imaging tests were done, you will be notified of the results when they are ready. This may take a few days or longer.  When to seek medical advice  Call your healthcare provider right away if any of these occur:  · Increased bleeding from the rectum  · Increased pain around the rectum or anus  · Weakness or dizziness  Call 911  Call 911 or return to the emergency department right away if any of these occur:  · Trouble breathing or swallowing  · Fainting or loss of consciousness  · Unusually fast heart rate  · Vomiting blood  · Large amounts of blood in stool  Date Last Reviewed: 6/22/2015 © 2000-2017 [employer]. [address] [place] [address]. All rights reserved. This information is not intended as a substitute for professional medical care. Always follow your healthcare professional's instructions.        Discharge Instructions: Eating a High-Fiber Diet  Your health care provider has prescribed a high-fiber diet for you. Fiber is what gives strength and structure to plants. Most grains, beans, vegetables, and fruits contain fiber. Foods rich in fiber are often low in calories and fat, but they fill you up more. These foods may also reduce the risk of certain health problems.  There are two types of fiber:  · Insoluble fiber. This is found in whole grains, cereals, and certain fruits and vegetables (such as apple skins, corn, and beans). Insoluble fiber is made up mainly of plant cell walls. It may prevent constipation and  reduce the risk of certain types of cancer.  · Soluble fiber. This type of fiber is found in oats, beans, nuts, and certain fruits and vegetables (such as strawberries and peas). Soluble fiber turns to gel in the digestive system, slowing the movement of the digestive tract. It helps control blood sugar levels and can reduce cholesterol, which may help lower the risk of heart disease. Soluble fiber can also help control appetite.     Home care  · Know how much fiber you need a day. The recommended daily amount of fiber is 25 grams for women and 38 grams for men. After age 50, daily fiber needs drop to 21 grams for women and 30 grams for men.  · Ask your doctor about a fiber supplement. (Always take fiber supplements with a large glass of water.)  · Keep track of how much fiber you eat.  · Eat a variety of foods high in fiber.  · Learn to read and understand food labels.  · Ask your healthcare provider how much water you should be drinking.  · Look for these high-fiber foods:  ¨ Whole-grain breads and cereals  § 6 ounces a day give you about 18 grams of fiber (1 ounce is equal to 1 slice of bread, 1 cup of dry cereal, or 1/2 cup of cooked rice).  § Include wheat and oat bran cereals, whole-wheat muffins or toast, and corn tortillas in your meals.  ¨ Fruits   § 2 cups a day give you about 8 grams of fiber.  § Apples, oranges, strawberries, pears, and bananas are good sources.  § Fruit juice does not contain as much fiber as the fruit it was made from.  ¨ Vegetables  § 2½ cups a day give you about 11 grams of fiber. Add asparagus, carrots, broccoli, peas, and corn to your meals.  ¨ Legumes  § 1/4 cup a day (in place of meat) gives you about 4 grams of fiber. Try navy beans, lentils, chickpeas, and soybeans.  ¨ Seeds   § A small handful of seeds gives you about 3 grams of fiber. Try sunflower seeds.  Follow-up  Make a follow-up appointment with a nutritionist as directed by our staff.  Date Last Reviewed: 6/1/2015  ©  "5702-3879 [employer]. [address] [place] [address], [place] [address]. All rights reserved. This information is not intended as a substitute for professional medical care. Always follow your healthcare professional's instructions.          Discharge Instructions: After Your Surgery/Procedure  Youve just had surgery. During surgery you were given medicine called anesthesia to keep you relaxed and free of pain. After surgery you may have some pain or nausea. This is common. Here are some tips for feeling better and getting well after surgery.     Stay on schedule with your medication.   Going home  Your doctor or nurse will show you how to take care of yourself when you go home. He or she will also answer your questions. Have an adult family member or friend drive you home.      For your safety we recommend these precaution for the first 24 hours after your procedure:  · Do not drive or use heavy equipment.  · Do not make important decisions or sign legal papers.  · Do not drink alcohol.  · Have someone stay with you, if needed. He or she can watch for problems and help keep you safe.  · Your concentration, balance, coordination, and judgement may be impaired for many hours after anesthesia.  Use caution when ambulating or standing up.     · You may feel weak and "washed out" after anesthesia and surgery.      Subtle residual effects of general anesthesia or sedation with regional / local anesthesia can last more than 24 hours.  Rest for the remainder of the day or longer if your Doctor/Surgeon has advised you to do so.  Although you may feel normal within the first 24 hours, your reflexes and mental ability may be impaired without you realizing it.  You may feel dizzy, lightheaded or sleepy for 24 hours or longer.      Be sure to go to all follow-up visits with your doctor. And rest after your surgery for as long as your doctor tells you to.  Coping with pain  If you have pain after surgery, pain medicine " will help you feel better. Take it as told, before pain becomes severe. Also, ask your doctor or pharmacist about other ways to control pain. This might be with heat, ice, or relaxation. And follow any other instructions your surgeon or nurse gives you.  Tips for taking pain medicine  To get the best relief possible, remember these points:  · Pain medicines can upset your stomach. Taking them with a little food may help.  · Most pain relievers taken by mouth need at least 20 to 30 minutes to start to work.  · Taking medicine on a schedule can help you remember to take it. Try to time your medicine so that you can take it before starting an activity. This might be before you get dressed, go for a walk, or sit down for dinner.  · Constipation is a common side effect of pain medicines. Call your doctor before taking any medicines such as laxatives or stool softeners to help ease constipation. Also ask if you should skip any foods. Drinking lots of fluids and eating foods such as fruits and vegetables that are high in fiber can also help. Remember, do not take laxatives unless your surgeon has prescribed them.  · Drinking alcohol and taking pain medicine can cause dizziness and slow your breathing. It can even be deadly. Do not drink alcohol while taking pain medicine.  · Pain medicine can make you react more slowly to things. Do not drive or run machinery while taking pain medicine.  Your health care provider may tell you to take acetaminophen to help ease your pain. Ask him or her how much you are supposed to take each day. Acetaminophen or other pain relievers may interact with your prescription medicines or other over-the-counter (OTC) drugs. Some prescription medicines have acetaminophen and other ingredients. Using both prescription and OTC acetaminophen for pain can cause you to overdose. Read the labels on your OTC medicines with care. This will help you to clearly know the list of ingredients, how much to take,  and any warnings. It may also help you not take too much acetaminophen. If you have questions or do not understand the information, ask your pharmacist or health care provider to explain it to you before you take the OTC medicine.  Managing nausea  Some people have an upset stomach after surgery. This is often because of anesthesia, pain, or pain medicine, or the stress of surgery. These tips will help you handle nausea and eat healthy foods as you get better. If you were on a special food plan before surgery, ask your doctor if you should follow it while you get better. These tips may help:  · Do not push yourself to eat. Your body will tell you when to eat and how much.  · Start off with clear liquids and soup. They are easier to digest.  · Next try semi-solid foods, such as mashed potatoes, applesauce, and gelatin, as you feel ready.  · Slowly move to solid foods. Dont eat fatty, rich, or spicy foods at first.  · Do not force yourself to have 3 large meals a day. Instead eat smaller amounts more often.  · Take pain medicines with a small amount of solid food, such as crackers or toast, to avoid nausea.     Call your surgeon if  · You still have pain an hour after taking medicine. The medicine may not be strong enough.  · You feel too sleepy, dizzy, or groggy. The medicine may be too strong.  · You have side effects like nausea, vomiting, or skin changes, such as rash, itching, or hives.       If you have obstructive sleep apnea  You were given anesthesia medicine during surgery to keep you comfortable and free of pain. After surgery, you may have more apnea spells because of this medicine and other medicines you were given. The spells may last longer than usual.   At home:  · Keep using the continuous positive airway pressure (CPAP) device when you sleep. Unless your health care provider tells you not to, use it when you sleep, day or night. CPAP is a common device used to treat obstructive sleep apnea.  · Talk  with your provider before taking any pain medicine, muscle relaxants, or sedatives. Your provider will tell you about the possible dangers of taking these medicines.  © 2426-3286 [employer]. [address] [place] [address], [place] [address]. All rights reserved. This information is not intended as a substitute for professional medical care. Always follow your healthcare professional's instructions.

## 2019-01-08 NOTE — ANESTHESIA POSTPROCEDURE EVALUATION
"Anesthesia Post Evaluation    Patient: Bessie Elliott    Procedure(s) Performed: Procedure(s) (LRB):  COLONOSCOPY (N/A)  EGD (ESOPHAGOGASTRODUODENOSCOPY) (N/A)    Final Anesthesia Type: general  Patient location during evaluation: PACU  Patient participation: Yes- Able to Participate  Level of consciousness: awake and alert and oriented  Post-procedure vital signs: reviewed and stable  Pain management: adequate  Airway patency: patent  PONV status at discharge: No PONV  Anesthetic complications: no      Cardiovascular status: blood pressure returned to baseline and stable  Respiratory status: unassisted and spontaneous ventilation  Hydration status: euvolemic  Follow-up not needed.        Visit Vitals  /71   Pulse (!) 56   Temp 36.8 °C (98.2 °F) (Tympanic)   Resp 16   Ht 5' 5" (1.651 m)   Wt 65.3 kg (144 lb)   SpO2 100%   Breastfeeding? No   BMI 23.96 kg/m²       Pain/Gerald Score: No Data Recorded      "

## 2019-01-08 NOTE — [employer] PATIENT INSTRUCTIONS
Discharge Summary/Instructions after an Endoscopic Procedure  Patient Name: Bessie Elliott  Patient MRN: [id number]  Patient YOB: 1970 Tuesday, January 08, 2019  Dudley Torres MD  RESTRICTIONS:  During your procedure today, you received medications for sedation.  These   medications may affect your judgment, balance and coordination.  Therefore,   for 24 hours, you have the following restrictions:   - DO NOT drive a car, operate machinery, make legal/financial decisions,   sign important papers or drink alcohol.    ACTIVITY:  Today: no heavy lifting, straining or running due to procedural   sedation/anesthesia.  The following day: return to full activity including work.  DIET:  Eat and drink normally unless instructed otherwise.     TREATMENT FOR COMMON SIDE EFFECTS:  - Mild abdominal pain, nausea, belching, bloating or excessive gas:  rest,   eat lightly and use a heating pad.  - Sore Throat: treat with throat lozenges and/or gargle with warm salt   water.  - Because air was used during the procedure, expelling large amounts of air   from your rectum or belching is normal.  - If a bowel prep was taken, you may not have a bowel movement for 1-3 days.    This is normal.  SYMPTOMS TO WATCH FOR AND REPORT TO YOUR PHYSICIAN:  1. Abdominal pain or bloating, other than gas cramps.  2. Chest pain.  3. Back pain.  4. Signs of infection such as: chills or fever occurring within 24 hours   after the procedure.  5. Rectal bleeding, which would show as bright red, maroon, or black stools.   (A tablespoon of blood from the rectum is not serious, especially if   hemorrhoids are present.)  6. Vomiting.  7. Weakness or dizziness.  GO DIRECTLY TO THE NEAREST EMERGENCY ROOM IF YOU HAVE ANY OF THE FOLLOWING:      Difficulty breathing              Chills and/or fever over 101 F   Persistent vomiting and/or vomiting blood   Severe abdominal pain   Severe chest pain   Black, tarry stools   Bleeding- more than one  tablespoon   Any other symptom or condition that you feel may need urgent attention  Your doctor recommends these additional instructions:  If any biopsies were taken, your doctors clinic will contact you in 1 to 2   weeks with any results.  - Patient has a contact number available for emergencies.  The signs and   symptoms of potential delayed complications were discussed with the   patient.  Return to normal activities tomorrow.  Written discharge   instructions were provided to the patient.   - Resume previous diet.   - Continue present medications.   - No aspirin, ibuprofen, naproxen, or other non-steroidal anti-inflammatory   drugs.   - Discharge patient to home (ambulatory).   - Follow an antireflux regimen.   - Perform a colonoscopy today.   - Return to my office PRN.  For questions, problems or results please call your physician - Dudley Torres MD at Work:  [phone].  [facility], EMERGENCY ROOM PHONE NUMBER: [phone]  IF A COMPLICATION OR EMERGENCY SITUATION ARISES AND YOU ARE UNABLE TO REACH   YOUR PHYSICIAN - GO DIRECTLY TO THE EMERGENCY ROOM.  Dudley Torres MD  1/8/2019 11:25:33 AM  This report has been verified and signed electronically.  [employer]

## 2019-01-08 NOTE — ANESTHESIA PREPROCEDURE EVALUATION
01/08/2019  Bessie Elliott is a 48 y.o., female.    Anesthesia Evaluation    I have reviewed the Patient Summary Reports.    I have reviewed the Nursing Notes.   I have reviewed the Medications.     Review of Systems  Anesthesia Hx:  No problems with previous Anesthesia    Social:  Non-Smoker    Cardiovascular:   hyperlipidemia    Pulmonary:   Asthma asymptomatic and mild    Renal/:  Renal/ Normal     Hepatic/GI:   Hiatal Hernia, S/P Gastric Bypass   Musculoskeletal:   Arthritis     Neurological:  Neurology Normal    Endocrine:   Diabetes, well controlled, type 2    Psych:   Psychiatric History (Bipolar 1)          Physical Exam  General:  Well nourished    Airway/Jaw/Neck:  Airway Findings: Mouth Opening: Normal Tongue: Normal  General Airway Assessment: Adult  Oropharynx Findings:  Mallampati: II  Jaw/Neck Findings:  Neck ROM: Normal ROM     Eyes/Ears/Nose:  Eyes/Ears/Nose Findings:    Dental:  Dental Findings:   Chest/Lungs:  Chest/Lungs Findings: Normal Respiratory Rate     Heart/Vascular:  Heart Findings: Rate: Normal  Rhythm: Regular Rhythm        Mental Status:  Mental Status Findings:  Cooperative, Alert and Oriented         Anesthesia Plan  Type of Anesthesia, risks & benefits discussed:  Anesthesia Type:  general  Patient's Preference:   Intra-op Monitoring Plan: standard ASA monitors  Intra-op Monitoring Plan Comments:   Post Op Pain Control Plan: multimodal analgesia  Post Op Pain Control Plan Comments:   Induction:   IV  Beta Blocker:  Patient is not currently on a Beta-Blocker (No further documentation required).       Informed Consent: Patient understands risks and agrees with Anesthesia plan.  Questions answered. Anesthesia consent signed with patient.  ASA Score: 3     Day of Surgery Review of History & Physical:  There are no significant changes.   H&P completed by Anesthesiologist.        Ready For Surgery From Anesthesia Perspective.

## 2019-01-09 ENCOUNTER — PATIENT MESSAGE (OUTPATIENT)
Dept: GASTROENTEROLOGY | Facility: CLINIC | Age: 49
End: 2019-01-09

## 2019-01-09 VITALS
DIASTOLIC BLOOD PRESSURE: 64 MMHG | HEART RATE: 64 BPM | WEIGHT: 144 LBS | SYSTOLIC BLOOD PRESSURE: 126 MMHG | TEMPERATURE: 98 F | HEIGHT: 65 IN | BODY MASS INDEX: 23.99 KG/M2 | OXYGEN SATURATION: 99 % | RESPIRATION RATE: 16 BRPM

## 2019-01-09 DIAGNOSIS — D50.9 IRON DEFICIENCY ANEMIA, UNSPECIFIED IRON DEFICIENCY ANEMIA TYPE: Primary | ICD-10-CM

## 2019-01-23 RX ORDER — SODIUM CHLORIDE 0.9 % (FLUSH) 0.9 %
3 SYRINGE (ML) INJECTION EVERY 8 HOURS
Status: CANCELLED | OUTPATIENT
Start: 2019-01-23

## 2019-01-23 RX ORDER — LIDOCAINE HYDROCHLORIDE 10 MG/ML
1 INJECTION, SOLUTION EPIDURAL; INFILTRATION; INTRACAUDAL; PERINEURAL ONCE
Status: CANCELLED | OUTPATIENT
Start: 2019-01-23 | End: 2019-01-23

## 2019-01-23 RX ORDER — SODIUM CHLORIDE, SODIUM LACTATE, POTASSIUM CHLORIDE, CALCIUM CHLORIDE 600; 310; 30; 20 MG/100ML; MG/100ML; MG/100ML; MG/100ML
10 INJECTION, SOLUTION INTRAVENOUS CONTINUOUS
Status: CANCELLED | OUTPATIENT
Start: 2019-01-24

## 2019-01-24 ENCOUNTER — HOSPITAL ENCOUNTER (OUTPATIENT)
Facility: HOSPITAL | Age: 49
Discharge: HOME | End: 2019-01-24
Attending: INTERNAL MEDICINE | Admitting: INTERNAL MEDICINE
Payer: MEDICARE

## 2019-01-24 DIAGNOSIS — D50.9 IDA (IRON DEFICIENCY ANEMIA): ICD-10-CM

## 2019-01-24 PROCEDURE — 25000003 PHARM REV CODE 250

## 2019-01-24 PROCEDURE — 91110 PR GI TRACT CAPSULE ENDOSCOPY: ICD-10-PCS | Mod: 26,,, | Performed by: INTERNAL MEDICINE

## 2019-01-24 PROCEDURE — 91110 GI TRC IMG INTRAL ESOPH-ILE: CPT | Mod: 26,,, | Performed by: INTERNAL MEDICINE

## 2019-01-24 PROCEDURE — 91110 GI TRC IMG INTRAL ESOPH-ILE: CPT | Performed by: INTERNAL MEDICINE

## 2019-01-24 RX ORDER — DEXTROMETHORPHAN/PSEUDOEPHED 2.5-7.5/.8
40 DROPS ORAL 4 TIMES DAILY PRN
Status: DISCONTINUED | OUTPATIENT
Start: 2019-01-24 | End: 2019-01-24 | Stop reason: HOSPADM

## 2019-01-24 RX ORDER — DEXTROMETHORPHAN/PSEUDOEPHED 2.5-7.5/.8
DROPS ORAL
Status: COMPLETED
Start: 2019-01-24 | End: 2019-01-24

## 2019-01-24 RX ADMIN — SIMETHICONE: 20 SUSPENSION/ DROPS ORAL at 07:01

## 2019-01-24 NOTE — PLAN OF CARE
Patient here in endoscopy for capsule endoscopy;  All instructions and explanations regarding procedure given and reviewed.  Patient verbalizes understanding.  Prep and clear liquids done and successful as per stated by patient.  Patient given mylicon to drink as ordered.  Vital signs within normal limits.  Sensor belt placed on patient without difficulty.  Dr. Torres at bedside to obtain consent from patient.  Patient does not verbalize any concerns to Dr. Torres.  Capsule swallowed at 0740 without difficutly.  All post instructions reviewed with patient and patient verbalizes understanding.  Patient discharged to home in stable conditionl.

## 2019-01-24 NOTE — H&P
CC: STARR    48 year old female with above. States that symptoms are absent, no alleviating/exacerbating factors. No family history of CA. No bleeding or weight loss.     ROS:  No headache, no fever/chills, no chest pain/SOB, no nausea/vomiting/diarrhea/constipation/GI bleeding/abdominal pain, no dysuria/hematuria.    VSSAF   Exam:   Alert and oriented x 3; no apparent distress   PERRLA, sclera anicteric  CV: Regular rate/rhythm, normal PMI   Lungs: Clear bilaterally with no wheeze/rales   Abdomen: Soft, NT/ND, normal bowel sounds   Ext: No cyanosis, clubbing     Impression:   As above    Plan:   Proceed with endoscopy. Further recs to follow.

## 2019-01-28 ENCOUNTER — PATIENT MESSAGE (OUTPATIENT)
Dept: GASTROENTEROLOGY | Facility: CLINIC | Age: 49
End: 2019-01-28

## 2019-01-28 NOTE — [employer] PATIENT INSTRUCTIONS
Discharge Summary/Instructions after an Endoscopic Procedure  Patient Name: Bessie Elliott  Patient MRN: [id number]  Patient YOB: 1970 Thursday, January 24, 2019  Dudley Torres MD  RESTRICTIONS:  During your procedure today, you received medications for sedation.  These   medications may affect your judgment, balance and coordination.  Therefore,   for 24 hours, you have the following restrictions:   - DO NOT drive a car, operate machinery, make legal/financial decisions,   sign important papers or drink alcohol.    ACTIVITY:  Today: no heavy lifting, straining or running due to procedural   sedation/anesthesia.  The following day: return to full activity including work.  DIET:  Eat and drink normally unless instructed otherwise.     TREATMENT FOR COMMON SIDE EFFECTS:  - Mild abdominal pain, nausea, belching, bloating or excessive gas:  rest,   eat lightly and use a heating pad.  - Sore Throat: treat with throat lozenges and/or gargle with warm salt   water.  - Because air was used during the procedure, expelling large amounts of air   from your rectum or belching is normal.  - If a bowel prep was taken, you may not have a bowel movement for 1-3 days.    This is normal.  SYMPTOMS TO WATCH FOR AND REPORT TO YOUR PHYSICIAN:  1. Abdominal pain or bloating, other than gas cramps.  2. Chest pain.  3. Back pain.  4. Signs of infection such as: chills or fever occurring within 24 hours   after the procedure.  5. Rectal bleeding, which would show as bright red, maroon, or black stools.   (A tablespoon of blood from the rectum is not serious, especially if   hemorrhoids are present.)  6. Vomiting.  7. Weakness or dizziness.  GO DIRECTLY TO THE NEAREST EMERGENCY ROOM IF YOU HAVE ANY OF THE FOLLOWING:      Difficulty breathing              Chills and/or fever over 101 F   Persistent vomiting and/or vomiting blood   Severe abdominal pain   Severe chest pain   Black, tarry stools   Bleeding- more than one  tablespoon   Any other symptom or condition that you feel may need urgent attention  Your doctor recommends these additional instructions:  If any biopsies were taken, your doctors clinic will contact you in 1 to 2   weeks with any results.  1.  Continue current medications  2.  Iron supplementation PRN  3.  No further GI endoscopy at this time unless overt bleeding occurs  4.  Follow up with PCP  5.  Follow up in my office in 3-6 months  For questions, problems or results please call your physician - Dudley Torres MD at Work:  [phone].  [facility], EMERGENCY ROOM PHONE NUMBER: [phone]  IF A COMPLICATION OR EMERGENCY SITUATION ARISES AND YOU ARE UNABLE TO REACH   YOUR PHYSICIAN - GO DIRECTLY TO THE EMERGENCY ROOM.  Dudley Torres MD  1/28/2019 2:19:04 PM  This report has been verified and signed electronically.  [employer]

## 2019-02-01 RX ORDER — ONDANSETRON 4 MG/1
4 TABLET, ORALLY DISINTEGRATING ORAL EVERY 6 HOURS PRN
Qty: 30 TABLET | Refills: 2 | Status: SHIPPED | OUTPATIENT
Start: 2019-02-01 | End: 2020-12-10

## 2019-08-12 RX ORDER — ONDANSETRON 4 MG/1
4 TABLET, ORALLY DISINTEGRATING ORAL EVERY 6 HOURS PRN
Qty: 30 TABLET | Refills: 0 | Status: SHIPPED | OUTPATIENT
Start: 2019-08-12 | End: 2019-09-26 | Stop reason: SDUPTHER

## 2019-09-26 RX ORDER — ONDANSETRON 4 MG/1
4 TABLET, ORALLY DISINTEGRATING ORAL EVERY 6 HOURS PRN
Qty: 30 TABLET | Refills: 0 | Status: SHIPPED | OUTPATIENT
Start: 2019-09-26 | End: 2020-12-10

## 2019-10-08 ENCOUNTER — PATIENT MESSAGE (OUTPATIENT)
Dept: GASTROENTEROLOGY | Facility: CLINIC | Age: 49
End: 2019-10-08

## 2019-10-08 RX ORDER — DEXLANSOPRAZOLE 60 MG/1
60 CAPSULE, DELAYED RELEASE ORAL DAILY
Qty: 90 CAPSULE | Refills: 3 | Status: SHIPPED | OUTPATIENT
Start: 2019-10-08 | End: 2022-08-08

## 2019-10-29 ENCOUNTER — HOSPITAL ENCOUNTER (EMERGENCY)
Facility: HOSPITAL | Age: 49
Discharge: HOME | End: 2019-10-29
Attending: EMERGENCY MEDICINE
Payer: MEDICARE

## 2019-10-29 VITALS
BODY MASS INDEX: 21.49 KG/M2 | HEART RATE: 56 BPM | SYSTOLIC BLOOD PRESSURE: 133 MMHG | TEMPERATURE: 98 F | OXYGEN SATURATION: 100 % | DIASTOLIC BLOOD PRESSURE: 65 MMHG | RESPIRATION RATE: 16 BRPM | HEIGHT: 65 IN | WEIGHT: 129 LBS

## 2019-10-29 DIAGNOSIS — G43.009 MIGRAINE WITHOUT AURA AND WITHOUT STATUS MIGRAINOSUS, NOT INTRACTABLE: Primary | ICD-10-CM

## 2019-10-29 LAB
ALBUMIN SERPL BCP-MCNC: 3.7 G/DL (ref 3.5–5.2)
ALP SERPL-CCNC: 57 U/L (ref 55–135)
ALT SERPL W/O P-5'-P-CCNC: 13 U/L (ref 10–44)
ANION GAP SERPL CALC-SCNC: 7 MMOL/L (ref 8–16)
AST SERPL-CCNC: 16 U/L (ref 10–40)
BASOPHILS # BLD AUTO: 0.01 K/UL (ref 0–0.2)
BASOPHILS NFR BLD: 0.3 % (ref 0–1.9)
BILIRUB SERPL-MCNC: 0.3 MG/DL (ref 0.1–1)
BUN SERPL-MCNC: 10 MG/DL (ref 6–20)
CALCIUM SERPL-MCNC: 8.4 MG/DL (ref 8.7–10.5)
CHLORIDE SERPL-SCNC: 105 MMOL/L (ref 95–110)
CO2 SERPL-SCNC: 27 MMOL/L (ref 23–29)
CREAT SERPL-MCNC: 0.7 MG/DL (ref 0.5–1.4)
DIFFERENTIAL METHOD: ABNORMAL
EOSINOPHIL # BLD AUTO: 0 K/UL (ref 0–0.5)
EOSINOPHIL NFR BLD: 0.8 % (ref 0–8)
ERYTHROCYTE [DISTWIDTH] IN BLOOD BY AUTOMATED COUNT: 20 % (ref 11.5–14.5)
EST. GFR  (AFRICAN AMERICAN): >60 ML/MIN/1.73 M^2
EST. GFR  (NON AFRICAN AMERICAN): >60 ML/MIN/1.73 M^2
GLUCOSE SERPL-MCNC: 95 MG/DL (ref 70–110)
HCT VFR BLD AUTO: 33.7 % (ref 37–48.5)
HGB BLD-MCNC: 10.4 G/DL (ref 12–16)
IMM GRANULOCYTES # BLD AUTO: 0 K/UL (ref 0–0.04)
IMM GRANULOCYTES NFR BLD AUTO: 0 % (ref 0–0.5)
LYMPHOCYTES # BLD AUTO: 2 K/UL (ref 1–4.8)
LYMPHOCYTES NFR BLD: 50.9 % (ref 18–48)
MAGNESIUM SERPL-MCNC: 2.2 MG/DL (ref 1.6–2.6)
MCH RBC QN AUTO: 28.6 PG (ref 27–31)
MCHC RBC AUTO-ENTMCNC: 30.9 G/DL (ref 32–36)
MCV RBC AUTO: 93 FL (ref 82–98)
MONOCYTES # BLD AUTO: 0.2 K/UL (ref 0.3–1)
MONOCYTES NFR BLD: 6 % (ref 4–15)
NEUTROPHILS # BLD AUTO: 1.6 K/UL (ref 1.8–7.7)
NEUTROPHILS NFR BLD: 42 % (ref 38–73)
NRBC BLD-RTO: 0 /100 WBC
PLATELET # BLD AUTO: 202 K/UL (ref 150–350)
PMV BLD AUTO: 11 FL (ref 9.2–12.9)
POTASSIUM SERPL-SCNC: 4.2 MMOL/L (ref 3.5–5.1)
PROT SERPL-MCNC: 6.4 G/DL (ref 6–8.4)
RBC # BLD AUTO: 3.64 M/UL (ref 4–5.4)
SODIUM SERPL-SCNC: 139 MMOL/L (ref 136–145)
WBC # BLD AUTO: 3.83 K/UL (ref 3.9–12.7)

## 2019-10-29 PROCEDURE — 99285 EMERGENCY DEPT VISIT HI MDM: CPT | Mod: 25

## 2019-10-29 PROCEDURE — 96366 THER/PROPH/DIAG IV INF ADDON: CPT

## 2019-10-29 PROCEDURE — 83735 ASSAY OF MAGNESIUM: CPT

## 2019-10-29 PROCEDURE — 85025 COMPLETE CBC W/AUTO DIFF WBC: CPT

## 2019-10-29 PROCEDURE — 96365 THER/PROPH/DIAG IV INF INIT: CPT

## 2019-10-29 PROCEDURE — 80053 COMPREHEN METABOLIC PANEL: CPT

## 2019-10-29 PROCEDURE — 96375 TX/PRO/DX INJ NEW DRUG ADDON: CPT

## 2019-10-29 PROCEDURE — 63600175 PHARM REV CODE 636 W HCPCS: Performed by: EMERGENCY MEDICINE

## 2019-10-29 RX ORDER — DIPHENHYDRAMINE HYDROCHLORIDE 50 MG/ML
25 INJECTION INTRAMUSCULAR; INTRAVENOUS
Status: COMPLETED | OUTPATIENT
Start: 2019-10-29 | End: 2019-10-29

## 2019-10-29 RX ADMIN — SODIUM CHLORIDE 1000 ML: 0.9 INJECTION, SOLUTION INTRAVENOUS at 01:10

## 2019-10-29 RX ADMIN — PROCHLORPERAZINE EDISYLATE 10 MG: 5 INJECTION INTRAMUSCULAR; INTRAVENOUS at 12:10

## 2019-10-29 RX ADMIN — DIPHENHYDRAMINE HYDROCHLORIDE 25 MG: 50 INJECTION INTRAMUSCULAR; INTRAVENOUS at 01:10

## 2019-10-29 NOTE — ED PROVIDER NOTES
Encounter Date: 10/29/2019       History 49-year-old female presents emergency room she has a long history of migraine headaches she has no current neurologist because she states that he no longer takes her insurance she has not seen a neurologist although on review of her medical records she was instructed see her neurologist several months ago.  Patient frequents the emergency department often for migraine headaches.  She denies any changes in her headaches denies worse headache of her life denies any recent fevers or febrile illnesses she just reports that her regular medications are not helping her headache which she has had for the last 2 days.   No chief complaint on file.    HPI  Review of patient's allergies indicates:   Allergen Reactions    Ace inhibitors Other (See Comments)     Cough    Morphine Other (See Comments)     headache     Past Medical History:   Diagnosis Date    Arthritis     bilateral knees and hands    Asthma     Bipolar 1 disorder     Diabetes mellitus     Hiatal hernia     HLD (hyperlipidemia)     Morbid obesity 2016    S/P gastric bypass 2016    by     Shoulder injury     left shoulder rotator cuff repair     Past Surgical History:   Procedure Laterality Date     SECTION      COLONOSCOPY N/A 2019    Procedure: COLONOSCOPY;  Surgeon: Dudley Araiza MD;  Location: [facility];  Service: Endoscopy;  Laterality: N/A;    ESOPHAGOGASTRODUODENOSCOPY N/A 2019    Procedure: EGD (ESOPHAGOGASTRODUODENOSCOPY);  Surgeon: Dudley Araiza MD;  Location: [facility];  Service: Endoscopy;  Laterality: N/A;    GASTRIC BYPASS  2016        HYSTERECTOMY      INTRALUMINAL GASTROINTESTINAL TRACT IMAGING VIA CAPSULE N/A 2019    Procedure: IMAGING PROCEDURE, GI TRACT, INTRALUMINAL, VIA CAPSULE;  Surgeon: Dudley Araiza MD;  Location: [facility];  Service: Endoscopy;  Laterality: N/A;    JOINT REPLACEMENT      bilateral knees     ROTATOR CUFF REPAIR Left 2016    TOTAL KNEE ARTHROPLASTY Bilateral 2015     Family History   Problem Relation Age of Onset    Diabetes Mother     Hyperlipidemia Mother     Hypertension Mother     Heart disease Father     Hypertension Brother     Breast cancer Sister      Social History     Tobacco Use    Smoking status: Never Smoker    Smokeless tobacco: Never Used   Substance Use Topics    Alcohol use: No    Drug use: No     Review of Systems   Constitutional: Negative.  Negative for fever.   HENT: Negative.    Eyes: Negative.    Respiratory: Negative.    Cardiovascular: Negative.    Gastrointestinal: Negative.    Endocrine: Negative.    Genitourinary: Negative.    Musculoskeletal: Negative.    Skin: Negative.    Allergic/Immunologic: Negative.    Neurological: Positive for headaches.   Hematological: Negative.    Psychiatric/Behavioral: Negative.        Physical Exam     Initial Vitals [10/29/19 1105]   BP Pulse Resp Temp SpO2   129/80 66 16 98.3 °F (36.8 °C) 100 %      MAP       --         Physical Exam    Nursing note and vitals reviewed.  Constitutional: She appears well-developed and well-nourished.   HENT:   Head: Normocephalic and atraumatic.   Right Ear: External ear normal.   Left Ear: External ear normal.   Nose: Nose normal.   Mouth/Throat: Oropharynx is clear and moist.   Eyes: EOM are normal. Pupils are equal, round, and reactive to light.   Neck: Normal range of motion. Neck supple.   Cardiovascular: Normal rate, regular rhythm, normal heart sounds and intact distal pulses.   Pulmonary/Chest: Breath sounds normal.   Abdominal: Soft. Bowel sounds are normal.   Musculoskeletal: Normal range of motion.   Neurological: She is alert and oriented to person, place, and time. She has normal strength and normal reflexes. GCS score is 15. GCS eye subscore is 4. GCS verbal subscore is 5. GCS motor subscore is 6.   Skin: Skin is warm and dry.   Psychiatric: She has a normal mood and affect.         ED  Course   Procedures  Labs Reviewed   COMPREHENSIVE METABOLIC PANEL   MAGNESIUM   CBC W/ AUTO DIFFERENTIAL   URINALYSIS, REFLEX TO URINE CULTURE          Imaging Results          CT Head without contrast (Final result)  Result time 10/29/19 12:29:59    Final result by German Michael MD (10/29/19 12:29:59)                 Impression:      No acute intracranial process      Electronically signed by: German Michael MD  Date:    10/29/2019  Time:    12:29             Narrative:    CLINICAL HISTORY:  ([id number] y/o  (1970) F    headache;Headache;    TECHNIQUE:  (A#[id number], exam time 10/29/2019 12:28)    CT HEAD WITHOUT CONTRAST [id number]    Axial CT of the brain without contrast using soft tissue and bone algorithm.    CMS MANDATED QUALITY DATA - CT RADIATION - 436    All CT scans at this facility utilize dose modulation, iterative reconstruction, and/or weight based dosing when appropriate to reduce radiation dose to as low as reasonably achievable.    COMPARISON:  11.17.18    FINDINGS:  No acute intracranial hemorrhage, edema or mass effect, and no acute parenchymal abnormality. There is no hydrocephalus, herniation or midline shift, and the basal and suprasellar cisterns are within normal limits. The osseous structures show no acute skull fracture. The ventricles and sulci are normal. There is normal gray white differentiation. Orbital contents appear within normal limits. External auditory canals are unremarkable. The visualized paranasal sinuses and mastoid air cells are essentially clear.                                                      Clinical Impression:       ICD-10-CM ICD-9-CM   1. Migraine without aura and without status migrainosus, not intractable G43.009 346.10                                Rosaura Kelly, SHAY  10/29/19 9426

## 2019-10-29 NOTE — ED TRIAGE NOTES
Pt states has had a migraine for the past 2 days that her prescribed meds aren't taking care of it

## 2020-05-17 ENCOUNTER — HOSPITAL ENCOUNTER (EMERGENCY)
Facility: HOSPITAL | Age: 50
Discharge: HOME | End: 2020-05-17
Attending: EMERGENCY MEDICINE
Payer: MEDICARE

## 2020-05-17 VITALS
DIASTOLIC BLOOD PRESSURE: 68 MMHG | RESPIRATION RATE: 18 BRPM | HEART RATE: 60 BPM | HEIGHT: 64 IN | BODY MASS INDEX: 22.2 KG/M2 | TEMPERATURE: 99 F | OXYGEN SATURATION: 100 % | WEIGHT: 130 LBS | SYSTOLIC BLOOD PRESSURE: 122 MMHG

## 2020-05-17 DIAGNOSIS — S49.92XA LEFT UPPER ARM INJURY, INITIAL ENCOUNTER: ICD-10-CM

## 2020-05-17 DIAGNOSIS — S46.912A MUSCLE STRAIN OF LEFT UPPER ARM, INITIAL ENCOUNTER: ICD-10-CM

## 2020-05-17 DIAGNOSIS — M79.602 PAIN OF LEFT UPPER EXTREMITY: Primary | ICD-10-CM

## 2020-05-17 PROCEDURE — 99283 EMERGENCY DEPT VISIT LOW MDM: CPT | Mod: 25

## 2020-05-17 PROCEDURE — 25000003 PHARM REV CODE 250: Performed by: PHYSICIAN ASSISTANT

## 2020-05-17 RX ADMIN — IBUPROFEN 600 MG: 400 TABLET, FILM COATED ORAL at 01:05

## 2020-05-17 NOTE — ED PROVIDER NOTES
Encounter Date: 2020       History     Chief Complaint   Patient presents with    Arm Pain     LEFT, FA X 5 DAYS AGO     49-year-old female presenting complaint of left elbow arm pain after after moving a chair on 1 day.  Patient states was cleaning the chair and hit a wall and jerked arm.  Denies any direct contact with arm.   Patient states pain with range of motion at elbow and forearm left side and holding her coke soda.  Patient is right handed patient denies taking medications for it.  Patient states no other complaints.          Review of patient's allergies indicates:   Allergen Reactions    Ace inhibitors Other (See Comments)     Cough    Morphine Other (See Comments)     headache     Past Medical History:   Diagnosis Date    Arthritis     bilateral knees and hands    Asthma     Bipolar 1 disorder     Diabetes mellitus     Hiatal hernia     HLD (hyperlipidemia)     Morbid obesity 2016    S/P gastric bypass 2016    by     Shoulder injury     left shoulder rotator cuff repair     Past Surgical History:   Procedure Laterality Date     SECTION      COLONOSCOPY N/A 2019    Procedure: COLONOSCOPY;  Surgeon: Dudley Araiza MD;  Location: [facility];  Service: Endoscopy;  Laterality: N/A;    ESOPHAGOGASTRODUODENOSCOPY N/A 2019    Procedure: EGD (ESOPHAGOGASTRODUODENOSCOPY);  Surgeon: Dudley Araiza MD;  Location: [facility];  Service: Endoscopy;  Laterality: N/A;    GASTRIC BYPASS  2016        HYSTERECTOMY      INTRALUMINAL GASTROINTESTINAL TRACT IMAGING VIA CAPSULE N/A 2019    Procedure: IMAGING PROCEDURE, GI TRACT, INTRALUMINAL, VIA CAPSULE;  Surgeon: Dudley Araiza MD;  Location: [facility];  Service: Endoscopy;  Laterality: N/A;    JOINT REPLACEMENT      bilateral knees    ROTATOR CUFF REPAIR Left 2016    TOTAL KNEE ARTHROPLASTY Bilateral      Family History   Problem Relation Age of Onset    Diabetes Mother      Hyperlipidemia Mother     Hypertension Mother     Heart disease Father     Hypertension Brother     Breast cancer Sister      Social History     Tobacco Use    Smoking status: Never Smoker    Smokeless tobacco: Never Used   Substance Use Topics    Alcohol use: No    Drug use: No     Review of Systems   Musculoskeletal: Positive for arthralgias, joint swelling and myalgias.   All other systems reviewed and are negative.      Physical Exam     Initial Vitals [05/17/20 1058]   BP Pulse Resp Temp SpO2   137/63 68 16 98.5 °F (36.9 °C) 100 %      MAP       --         Physical Exam    Nursing note and vitals reviewed.  Constitutional: She appears well-developed and well-nourished.   HENT:   Head: Normocephalic and atraumatic.   Right Ear: External ear normal.   Left Ear: External ear normal.   Eyes: EOM are normal.   Neck: Neck supple.   Cardiovascular: Normal rate and regular rhythm.   Pulmonary/Chest: Breath sounds normal. No respiratory distress.   Abdominal: Soft. Bowel sounds are normal.   Musculoskeletal: Normal range of motion. She exhibits tenderness.   Tenderness over left elbow and forearm near elbow neurovascular intact distally 2+ pulses radial good cap refill full range of motion of digits nontender wrist or hand no humeral tenderness   Neurological: She is alert and oriented to person, place, and time. She has normal strength. No sensory deficit. GCS score is 15. GCS eye subscore is 4. GCS verbal subscore is 5. GCS motor subscore is 6.   Skin: Skin is warm. Capillary refill takes less than 2 seconds.   Psychiatric: She has a normal mood and affect. Her behavior is normal. Thought content normal.         ED Course   Procedures  Labs Reviewed - No data to display       Imaging Results          X-Ray Elbow Complete Left (Final result)  Result time 05/17/20 11:21:33    Final result by Mitzy Gaviria MD (05/17/20 11:21:33)                 Narrative:    4 views of the left elbow    Clinical history is  pain    There are no fractures, dislocations or acute osseous abnormalities.  Soft tissues are normal.    IMPRESSION: Negative left elbow    Electronically Signed by Mitzy Gaviria M.D. on 5/17/2020 11:57 AM                             X-Ray Forearm Left (Final result)  Result time 05/17/20 11:21:33    Final result by Mitzy Gaviria MD (05/17/20 11:21:33)                 Narrative:    Reason: Pain    Findings:  2 views left forearm show no fracture, dislocation, or destructive  osseous lesion. Soft tissues are unremarkable.    Impression:  Negative exam.    Electronically Signed by Mitzy Gaviria M.D. on 5/17/2020 11:59 AM                               Medical Decision Making:   Clinical Tests:   Radiological Study: Ordered and Reviewed  ED Management:  Reviewed x-rays resulted negative advised open Ace wrap and sling advised help with Ortho if continued pain.  If severe pain return to the emergency room.  May take Motrin or Tylenol over-the-counter for pain                                 Clinical Impression:       ICD-10-CM ICD-9-CM   1. Pain of left upper extremity M79.602 729.5   2. Left upper arm injury, initial encounter S49.92XA 959.2   3. Muscle strain of left upper arm, initial encounter S46.912A 840.9         Disposition:   Disposition: Discharged  Condition: Stable     ED Disposition Condition    Discharge Stable        ED Prescriptions     None        Follow-up Information     Follow up With Specialties Details Why Contact Info    Dru Bee MD Internal Medicine In 2 days  [address]  [place] [address]  [phone]      Reinier Garcia MD Sports Medicine, Orthopedic Surgery In 2 days  [address] [place] [address] [address]  [facility] [address]  [phone]                                       Sugey Tovar PA-C  05/17/20 0049

## 2020-05-17 NOTE — DISCHARGE INSTRUCTIONS
Advised help with orthopedic continued pain rest ice elevation compression Ace wrap sling.  Take Motrin for pain Tylenol continue pain repeat x-rays with your primary doctor or orthopedic.

## 2020-11-10 ENCOUNTER — HOSPITAL ENCOUNTER (EMERGENCY)
Facility: HOSPITAL | Age: 50
Discharge: HOME | End: 2020-11-10
Attending: EMERGENCY MEDICINE
Payer: MEDICARE

## 2020-11-10 VITALS
WEIGHT: 142 LBS | HEIGHT: 65 IN | HEART RATE: 53 BPM | BODY MASS INDEX: 23.66 KG/M2 | DIASTOLIC BLOOD PRESSURE: 66 MMHG | TEMPERATURE: 98 F | RESPIRATION RATE: 18 BRPM | SYSTOLIC BLOOD PRESSURE: 134 MMHG | OXYGEN SATURATION: 100 %

## 2020-11-10 DIAGNOSIS — G43.801 OTHER MIGRAINE WITH STATUS MIGRAINOSUS, NOT INTRACTABLE: Primary | ICD-10-CM

## 2020-11-10 PROCEDURE — 25000003 PHARM REV CODE 250: Performed by: EMERGENCY MEDICINE

## 2020-11-10 PROCEDURE — 96374 THER/PROPH/DIAG INJ IV PUSH: CPT

## 2020-11-10 PROCEDURE — 99284 EMERGENCY DEPT VISIT MOD MDM: CPT | Mod: 25

## 2020-11-10 PROCEDURE — 63600175 PHARM REV CODE 636 W HCPCS: Performed by: EMERGENCY MEDICINE

## 2020-11-10 PROCEDURE — 96375 TX/PRO/DX INJ NEW DRUG ADDON: CPT

## 2020-11-10 RX ORDER — PROCHLORPERAZINE EDISYLATE 5 MG/ML
10 INJECTION INTRAMUSCULAR; INTRAVENOUS
Status: COMPLETED | OUTPATIENT
Start: 2020-11-10 | End: 2020-11-10

## 2020-11-10 RX ORDER — SODIUM CHLORIDE 9 MG/ML
1000 INJECTION, SOLUTION INTRAVENOUS
Status: COMPLETED | OUTPATIENT
Start: 2020-11-10 | End: 2020-11-10

## 2020-11-10 RX ORDER — DIPHENHYDRAMINE HYDROCHLORIDE 50 MG/ML
25 INJECTION INTRAMUSCULAR; INTRAVENOUS
Status: COMPLETED | OUTPATIENT
Start: 2020-11-10 | End: 2020-11-10

## 2020-11-10 RX ADMIN — SODIUM CHLORIDE 1000 ML: 0.9 INJECTION, SOLUTION INTRAVENOUS at 01:11

## 2020-11-10 RX ADMIN — DIPHENHYDRAMINE HYDROCHLORIDE 25 MG: 50 INJECTION INTRAMUSCULAR; INTRAVENOUS at 01:11

## 2020-11-10 RX ADMIN — PROCHLORPERAZINE EDISYLATE 10 MG: 5 INJECTION INTRAMUSCULAR; INTRAVENOUS at 01:11

## 2020-11-10 NOTE — ED PROVIDER NOTES
Encounter Date: 11/10/2020       History     Chief Complaint   Patient presents with    Headache     Patient here with reported migraine headache onset over last 5 days patient has taken Imitrex her last doses at around 2:00 a.m. states despite taking her prescribed Imitrex the symptoms continue she reports associated nausea vomiting and photophobia states headache is like her previous migraine headaches states she gets very frequent headaches and occasionally does have to come to the emergency department for further treatment        Review of patient's allergies indicates:   Allergen Reactions    Ace inhibitors Other (See Comments)     Cough    Morphine Other (See Comments)     headache     Past Medical History:   Diagnosis Date    Arthritis     bilateral knees and hands    Asthma     Bipolar 1 disorder     Diabetes mellitus     Hiatal hernia     HLD (hyperlipidemia)     Morbid obesity 2016    S/P gastric bypass 2016    by     Shoulder injury     left shoulder rotator cuff repair     Past Surgical History:   Procedure Laterality Date     SECTION      COLONOSCOPY N/A 2019    Procedure: COLONOSCOPY;  Surgeon: Dudley Araiza MD;  Location: [facility];  Service: Endoscopy;  Laterality: N/A;    ESOPHAGOGASTRODUODENOSCOPY N/A 2019    Procedure: EGD (ESOPHAGOGASTRODUODENOSCOPY);  Surgeon: Dudley Araiza MD;  Location: [facility];  Service: Endoscopy;  Laterality: N/A;    GASTRIC BYPASS  2016        HYSTERECTOMY      INTRALUMINAL GASTROINTESTINAL TRACT IMAGING VIA CAPSULE N/A 2019    Procedure: IMAGING PROCEDURE, GI TRACT, INTRALUMINAL, VIA CAPSULE;  Surgeon: Dudley Araiza MD;  Location: [facility];  Service: Endoscopy;  Laterality: N/A;    JOINT REPLACEMENT      bilateral knees    ROTATOR CUFF REPAIR Left 2016    TOTAL KNEE ARTHROPLASTY Bilateral      Family History   Problem Relation Age of Onset    Diabetes Mother      Hyperlipidemia Mother     Hypertension Mother     Heart disease Father     Hypertension Brother     Breast cancer Sister      Social History     Tobacco Use    Smoking status: Never Smoker    Smokeless tobacco: Never Used   Substance Use Topics    Alcohol use: No    Drug use: No     Review of Systems   Constitutional: Negative for chills and fever.   HENT: Negative for congestion, ear pain and sore throat.    Eyes: Positive for photophobia. Negative for pain.   Respiratory: Negative for cough and shortness of breath.    Cardiovascular: Negative for chest pain and leg swelling.   Gastrointestinal: Positive for nausea. Negative for abdominal pain, constipation, diarrhea and vomiting.   Genitourinary: Negative for dysuria, frequency and hematuria.   Skin: Negative for rash.   Neurological: Positive for headaches. Negative for weakness.   Hematological: Negative for adenopathy.       Physical Exam     Initial Vitals [11/10/20 1135]   BP Pulse Resp Temp SpO2   135/71 60 18 97.7 °F (36.5 °C) 100 %      MAP       --         Physical Exam    Constitutional: She appears well-developed and well-nourished. She appears distressed.   HENT:   Head: Normocephalic and atraumatic.   Right Ear: External ear normal.   Left Ear: External ear normal.   Mouth/Throat: Oropharynx is clear and moist.   Eyes: Conjunctivae and EOM are normal. Pupils are equal, round, and reactive to light.   Neck: Normal range of motion. Neck supple.   Cardiovascular: Normal rate, regular rhythm, normal heart sounds and intact distal pulses.   Pulmonary/Chest: Breath sounds normal. No respiratory distress.   Abdominal: Soft. Bowel sounds are normal. There is no abdominal tenderness.   Musculoskeletal: Normal range of motion. No edema.   Neurological: She is alert and oriented to person, place, and time. She has normal strength. No cranial nerve deficit. GCS score is 15. GCS eye subscore is 4. GCS verbal subscore is 5. GCS motor subscore is 6.   Skin:  Skin is warm and dry. Capillary refill takes less than 2 seconds. No rash noted.   Psychiatric: She has a normal mood and affect. Her behavior is normal.         ED Course   Procedures  Labs Reviewed - No data to display       Imaging Results    None          Medical Decision Making:   ED Management:  Patient received Compazine Benadryl and IV fluids in the emergency department with resolution of her migraine will release with recommendations for outpatient follow-up patient has Imitrex at the house                             Clinical Impression:     ICD-10-CM ICD-9-CM   1. Other migraine with status migrainosus, not intractable  G43.801 346.82                          ED Disposition Condition    Discharge Stable        ED Prescriptions     None        Follow-up Information     Follow up With Specialties Details Why Contact Info    Dru Bee MD Internal Medicine Call in 1 day for further evaluation and treatment [address] [facility] [place] [address] [phone]                                         Lee Grubbs MD  11/10/20 7917

## 2020-11-10 NOTE — FIRST PROVIDER EVALUATION
Medical screening exam completed.  I have conducted a focused provider triage encounter, findings are as follows:    Brief history of present illness:  Headache  5 days history of migraines reports vomiting AND PHOTOPHOBIA     There were no vitals filed for this visit.    Pertinent physical exam:  HRR Lungs CTA     Brief workup plan:  HA work up    Preliminary workup initiated; this workup will be continued and followed by the physician or advanced practice provider that is assigned to the patient when roomed.

## 2020-11-16 ENCOUNTER — TELEPHONE (OUTPATIENT)
Dept: SURGERY | Facility: CLINIC | Age: 50
End: 2020-11-16

## 2020-11-16 NOTE — TELEPHONE ENCOUNTER
----- Message from Abigail Ortega sent at 11/16/2020  7:04 AM CST -----  Contact: call pt [phone]   Type: Needs Medical Advice  Who Called  pt   Best Call Back Number:call pt [phone]  Additional Information:    pt is  calling  with  questions  about  ins   that   dr juan // pt  stated she spoke to a  lady  who   works  on  ins   // please call  for  details

## 2020-11-16 NOTE — TELEPHONE ENCOUNTER
Called pt, scheduled f/u appt, scheduled consult with daughter, daughter had heart sx at 11 months, will discuss with md. pt acknowledged.

## 2020-11-24 ENCOUNTER — HOSPITAL ENCOUNTER (EMERGENCY)
Facility: HOSPITAL | Age: 50
Discharge: HOME | End: 2020-11-24
Attending: EMERGENCY MEDICINE
Payer: MEDICARE

## 2020-11-24 VITALS
DIASTOLIC BLOOD PRESSURE: 75 MMHG | OXYGEN SATURATION: 100 % | HEIGHT: 65 IN | SYSTOLIC BLOOD PRESSURE: 158 MMHG | BODY MASS INDEX: 23.32 KG/M2 | TEMPERATURE: 99 F | RESPIRATION RATE: 18 BRPM | WEIGHT: 140 LBS | HEART RATE: 59 BPM

## 2020-11-24 DIAGNOSIS — G43.809 OTHER MIGRAINE WITHOUT STATUS MIGRAINOSUS, NOT INTRACTABLE: Primary | ICD-10-CM

## 2020-11-24 PROCEDURE — 96374 THER/PROPH/DIAG INJ IV PUSH: CPT

## 2020-11-24 PROCEDURE — 99284 EMERGENCY DEPT VISIT MOD MDM: CPT | Mod: 25

## 2020-11-24 PROCEDURE — 96375 TX/PRO/DX INJ NEW DRUG ADDON: CPT

## 2020-11-24 PROCEDURE — 63600175 PHARM REV CODE 636 W HCPCS: Performed by: EMERGENCY MEDICINE

## 2020-11-24 RX ORDER — PROCHLORPERAZINE EDISYLATE 5 MG/ML
10 INJECTION INTRAMUSCULAR; INTRAVENOUS
Status: COMPLETED | OUTPATIENT
Start: 2020-11-24 | End: 2020-11-24

## 2020-11-24 RX ORDER — DIPHENHYDRAMINE HYDROCHLORIDE 50 MG/ML
12.5 INJECTION INTRAMUSCULAR; INTRAVENOUS
Status: COMPLETED | OUTPATIENT
Start: 2020-11-24 | End: 2020-11-24

## 2020-11-24 RX ORDER — BUTALBITAL, ACETAMINOPHEN AND CAFFEINE 50; 325; 40 MG/1; MG/1; MG/1
1 TABLET ORAL EVERY 6 HOURS PRN
Qty: 14 TABLET | Refills: 0 | Status: SHIPPED | OUTPATIENT
Start: 2020-11-24 | End: 2020-12-10

## 2020-11-24 RX ADMIN — PROCHLORPERAZINE EDISYLATE 10 MG: 5 INJECTION INTRAMUSCULAR; INTRAVENOUS at 05:11

## 2020-11-24 RX ADMIN — DIPHENHYDRAMINE HYDROCHLORIDE 12.5 MG: 50 INJECTION INTRAMUSCULAR; INTRAVENOUS at 05:11

## 2020-11-24 NOTE — ED PROVIDER NOTES
Encounter Date: 2020       History     Chief Complaint   Patient presents with    Migraine     X 1 DAY    Nausea     50-year-old female presents complaining of headache, patient describes headache as frontal she denies sudden onset she denies unusual character patient reports she has migraine headaches and this is consistent with her usual migraine headache patient denies trauma patient denies fever patient denies cough or shortness of breath.        Review of patient's allergies indicates:   Allergen Reactions    Ace inhibitors Other (See Comments)     Cough    Morphine Other (See Comments)     headache     Past Medical History:   Diagnosis Date    Arthritis     bilateral knees and hands    Asthma     Bipolar 1 disorder     Diabetes mellitus     Hiatal hernia     HLD (hyperlipidemia)     Migraine headache     Morbid obesity 2016    S/P gastric bypass 2016    by     Shoulder injury     left shoulder rotator cuff repair     Past Surgical History:   Procedure Laterality Date     SECTION      COLONOSCOPY N/A 2019    Procedure: COLONOSCOPY;  Surgeon: Dudley Araiza MD;  Location: [facility] ENDO;  Service: Endoscopy;  Laterality: N/A;    ESOPHAGOGASTRODUODENOSCOPY N/A 2019    Procedure: EGD (ESOPHAGOGASTRODUODENOSCOPY);  Surgeon: Dudley Araiza MD;  Location: [facility];  Service: Endoscopy;  Laterality: N/A;    GASTRIC BYPASS  2016        HYSTERECTOMY      INTRALUMINAL GASTROINTESTINAL TRACT IMAGING VIA CAPSULE N/A 2019    Procedure: IMAGING PROCEDURE, GI TRACT, INTRALUMINAL, VIA CAPSULE;  Surgeon: Dudley Araiza MD;  Location: [facility] ENDO;  Service: Endoscopy;  Laterality: N/A;    JOINT REPLACEMENT      bilateral knees    ROTATOR CUFF REPAIR Left 2016    TOTAL KNEE ARTHROPLASTY Bilateral      Family History   Problem Relation Age of Onset    Diabetes Mother     Hyperlipidemia Mother     Hypertension Mother     Heart disease  Father     Hypertension Brother     Breast cancer Sister      Social History     Tobacco Use    Smoking status: Never Smoker    Smokeless tobacco: Never Used   Substance Use Topics    Alcohol use: No    Drug use: No     Review of Systems   Constitutional: Negative for fever.   HENT: Negative for congestion, rhinorrhea, sore throat and trouble swallowing.    Eyes: Negative for visual disturbance.   Respiratory: Negative for cough, chest tightness, shortness of breath and wheezing.    Cardiovascular: Negative for chest pain, palpitations and leg swelling.   Gastrointestinal: Negative for abdominal distention, abdominal pain, constipation, diarrhea, nausea and vomiting.   Genitourinary: Negative for difficulty urinating, dysuria, flank pain and frequency.   Musculoskeletal: Negative for arthralgias, back pain, joint swelling and neck pain.   Skin: Negative for color change and rash.   Neurological: Positive for headaches. Negative for dizziness, syncope, speech difficulty, weakness and numbness.   All other systems reviewed and are negative.      Physical Exam     Initial Vitals [11/24/20 1722]   BP Pulse Resp Temp SpO2   (!) 146/64 (!) 55 20 98.5 °F (36.9 °C) 98 %      MAP       --         Physical Exam    Nursing note and vitals reviewed.  Constitutional: She appears well-developed and well-nourished. She is not diaphoretic. No distress.   HENT:   Head: Normocephalic and atraumatic.   Right Ear: External ear normal.   Left Ear: External ear normal.   Nose: Nose normal.   Mouth/Throat: Oropharynx is clear and moist. No oropharyngeal exudate.   Eyes: Conjunctivae and EOM are normal. Pupils are equal, round, and reactive to light. Right eye exhibits no discharge. Left eye exhibits no discharge. No scleral icterus.   Neck: Normal range of motion. Neck supple. No thyromegaly present. No tracheal deviation present. No JVD present.   Cardiovascular: Normal rate, regular rhythm, normal heart sounds and intact distal  pulses. Exam reveals no gallop and no friction rub.    No murmur heard.  Pulmonary/Chest: Breath sounds normal. No stridor. No respiratory distress. She has no wheezes. She has no rhonchi. She has no rales. She exhibits no tenderness.   Abdominal: Soft. Bowel sounds are normal. She exhibits no distension and no mass. There is no abdominal tenderness. There is no rebound and no guarding.   Musculoskeletal: Normal range of motion. No tenderness or edema.   Lymphadenopathy:     She has no cervical adenopathy.   Neurological: She is alert and oriented to person, place, and time. She has normal strength. She displays normal reflexes. No cranial nerve deficit or sensory deficit.   Skin: Skin is warm and dry. No rash and no abscess noted. No erythema. No pallor.         ED Course   Procedures  Labs Reviewed - No data to display       Imaging Results    None          Medical Decision Making:   History:   Old Medical Records: I decided to obtain old medical records.  Initial Assessment:   Emergent evaluation of a 50-year-old female presenting with headache patient will be given medications for her migraine headache I do not suspect meningitis patient has no fever or neck stiffness patient has normal neurologic exam and physical exam patient has no sudden onset of headache in she denies worst headache of life.              Attending Attestation:             Attending ED Notes:   Patient reports that she is feeling much better headache has resolved.  Patient will be discharged home, she is diagnosed with migraine I will start her on a course of Fioricet and she is cautioned to return immediately for any worsening or any further concerns otherwise she is to follow up with Neurology in the next 2-3 days.                    Clinical Impression:     ICD-10-CM ICD-9-CM   1. Other migraine without status migrainosus, not intractable  G43.809 346.80                          ED Disposition Condition    Discharge Stable        ED  Prescriptions     Medication Sig Dispense Start Date End Date Auth. Provider    butalbital-acetaminophen-caffeine -40 mg (FIORICET, ESGIC) -40 mg per tablet Take 1 tablet by mouth every 6 (six) hours as needed for Headaches. 14 tablet 11/24/2020 12/24/2020 Colin Hall MD        Follow-up Information     Follow up With Specialties Details Why Contact Info Additional Information    Dru Bee MD Internal Medicine Schedule an appointment as soon as possible for a visit in 2 days  [address] [place] [address]  [phone]       [facility] Emergency Medicine  If symptoms worsen [address] [address] [phone] 1st floor    Kashmir Mills MD Vascular Neurology, Neurology Schedule an appointment as soon as possible for a visit in 2 days  [address] [facility]  [facility] [address]  [phone]                                          Colin Hall MD  11/24/20 3551

## 2020-11-24 NOTE — ED NOTES
First contact with patient. She is lying on stretcher crying. States she has had a migraine headache since yesterday. She has a history of migraine Has. See Dr. Lee in [place] for her CRESPO.

## 2020-12-01 ENCOUNTER — TELEPHONE (OUTPATIENT)
Dept: BARIATRICS | Facility: CLINIC | Age: 50
End: 2020-12-01

## 2020-12-01 NOTE — TELEPHONE ENCOUNTER
----- Message from Alix Dent sent at 12/1/2020  1:39 PM CST -----  Regarding: refill  Contact: patient  Type:  RX Refill Request    Who Called:  Patient   Refill or New Rx:  Refill  RX Name and Strength:  Zofran    How is the patient currently taking it? (ex. 1XDay):  as needed  Is this a 30 day or 90 day RX:  30day  Preferred Pharmacy with phone number:    [email or web address] [address]  Phone: [phone] Fax: [phone]     Local or Mail Order:  Local  Ordering Provider:  Deysi Yoder  [facility] Call Back Number:  [phone] ([place])     Case number [id number]

## 2020-12-04 RX ORDER — ONDANSETRON 4 MG/1
4 TABLET, ORALLY DISINTEGRATING ORAL EVERY 6 HOURS PRN
Qty: 30 TABLET | Refills: 2 | OUTPATIENT
Start: 2020-12-04

## 2020-12-10 ENCOUNTER — OFFICE VISIT (OUTPATIENT)
Dept: BARIATRICS | Facility: CLINIC | Age: 50
End: 2020-12-10
Payer: MEDICARE

## 2020-12-10 ENCOUNTER — HOSPITAL ENCOUNTER (EMERGENCY)
Facility: HOSPITAL | Age: 50
Discharge: HOME | End: 2020-12-10
Attending: EMERGENCY MEDICINE
Payer: MEDICARE

## 2020-12-10 VITALS
WEIGHT: 135 LBS | DIASTOLIC BLOOD PRESSURE: 63 MMHG | HEIGHT: 65 IN | BODY MASS INDEX: 22.49 KG/M2 | RESPIRATION RATE: 19 BRPM | OXYGEN SATURATION: 97 % | HEART RATE: 65 BPM | SYSTOLIC BLOOD PRESSURE: 130 MMHG | TEMPERATURE: 99 F

## 2020-12-10 VITALS
BODY MASS INDEX: 23.69 KG/M2 | HEIGHT: 65 IN | HEART RATE: 71 BPM | DIASTOLIC BLOOD PRESSURE: 69 MMHG | TEMPERATURE: 97 F | SYSTOLIC BLOOD PRESSURE: 132 MMHG | RESPIRATION RATE: 16 BRPM | WEIGHT: 142.19 LBS

## 2020-12-10 DIAGNOSIS — E53.8 B12 DEFICIENCY: ICD-10-CM

## 2020-12-10 DIAGNOSIS — E55.9 VITAMIN D DEFICIENCY: ICD-10-CM

## 2020-12-10 DIAGNOSIS — R07.9 CHEST PAIN: ICD-10-CM

## 2020-12-10 DIAGNOSIS — R10.13 EPIGASTRIC PAIN: Primary | ICD-10-CM

## 2020-12-10 DIAGNOSIS — G43.009 MIGRAINE WITHOUT AURA AND WITHOUT STATUS MIGRAINOSUS, NOT INTRACTABLE: Primary | ICD-10-CM

## 2020-12-10 DIAGNOSIS — E78.5 DYSLIPIDEMIA: ICD-10-CM

## 2020-12-10 DIAGNOSIS — E61.1 IRON DEFICIENCY: ICD-10-CM

## 2020-12-10 LAB
ALBUMIN SERPL BCP-MCNC: 3.6 G/DL (ref 3.5–5.2)
ALP SERPL-CCNC: 74 U/L (ref 55–135)
ALT SERPL W/O P-5'-P-CCNC: 17 U/L (ref 10–44)
ANION GAP SERPL CALC-SCNC: 5 MMOL/L (ref 8–16)
AST SERPL-CCNC: 20 U/L (ref 10–40)
BASOPHILS # BLD AUTO: 0.03 K/UL (ref 0–0.2)
BASOPHILS NFR BLD: 0.5 % (ref 0–1.9)
BILIRUB SERPL-MCNC: 0.4 MG/DL (ref 0.1–1)
BNP SERPL-MCNC: 104 PG/ML (ref 0–99)
BUN SERPL-MCNC: 14 MG/DL (ref 6–20)
CALCIUM SERPL-MCNC: 7.8 MG/DL (ref 8.7–10.5)
CHLORIDE SERPL-SCNC: 107 MMOL/L (ref 95–110)
CO2 SERPL-SCNC: 28 MMOL/L (ref 23–29)
CREAT SERPL-MCNC: 0.9 MG/DL (ref 0.5–1.4)
DIFFERENTIAL METHOD: ABNORMAL
EOSINOPHIL # BLD AUTO: 0.1 K/UL (ref 0–0.5)
EOSINOPHIL NFR BLD: 1.8 % (ref 0–8)
ERYTHROCYTE [DISTWIDTH] IN BLOOD BY AUTOMATED COUNT: 13.2 % (ref 11.5–14.5)
EST. GFR  (AFRICAN AMERICAN): >60 ML/MIN/1.73 M^2
EST. GFR  (NON AFRICAN AMERICAN): >60 ML/MIN/1.73 M^2
GLUCOSE SERPL-MCNC: 115 MG/DL (ref 70–110)
HCT VFR BLD AUTO: 34.3 % (ref 37–48.5)
HGB BLD-MCNC: 10.4 G/DL (ref 12–16)
IMM GRANULOCYTES # BLD AUTO: 0.01 K/UL (ref 0–0.04)
IMM GRANULOCYTES NFR BLD AUTO: 0.2 % (ref 0–0.5)
LIPASE SERPL-CCNC: 32 U/L (ref 4–60)
LYMPHOCYTES # BLD AUTO: 2.1 K/UL (ref 1–4.8)
LYMPHOCYTES NFR BLD: 33.4 % (ref 18–48)
MCH RBC QN AUTO: 30.2 PG (ref 27–31)
MCHC RBC AUTO-ENTMCNC: 30.3 G/DL (ref 32–36)
MCV RBC AUTO: 100 FL (ref 82–98)
MONOCYTES # BLD AUTO: 0.4 K/UL (ref 0.3–1)
MONOCYTES NFR BLD: 6.4 % (ref 4–15)
NEUTROPHILS # BLD AUTO: 3.5 K/UL (ref 1.8–7.7)
NEUTROPHILS NFR BLD: 57.7 % (ref 38–73)
NRBC BLD-RTO: 0 /100 WBC
PLATELET # BLD AUTO: 296 K/UL (ref 150–350)
PMV BLD AUTO: 10.6 FL (ref 9.2–12.9)
POTASSIUM SERPL-SCNC: 4.1 MMOL/L (ref 3.5–5.1)
PROT SERPL-MCNC: 6.7 G/DL (ref 6–8.4)
RBC # BLD AUTO: 3.44 M/UL (ref 4–5.4)
SODIUM SERPL-SCNC: 140 MMOL/L (ref 136–145)
TROPONIN I SERPL DL<=0.01 NG/ML-MCNC: <0.03 NG/ML
WBC # BLD AUTO: 6.13 K/UL (ref 3.9–12.7)

## 2020-12-10 PROCEDURE — 96365 THER/PROPH/DIAG IV INF INIT: CPT

## 2020-12-10 PROCEDURE — C9113 INJ PANTOPRAZOLE SODIUM, VIA: HCPCS | Performed by: EMERGENCY MEDICINE

## 2020-12-10 PROCEDURE — 99285 EMERGENCY DEPT VISIT HI MDM: CPT | Mod: 25

## 2020-12-10 PROCEDURE — 80053 COMPREHEN METABOLIC PANEL: CPT

## 2020-12-10 PROCEDURE — 84484 ASSAY OF TROPONIN QUANT: CPT

## 2020-12-10 PROCEDURE — 83880 ASSAY OF NATRIURETIC PEPTIDE: CPT

## 2020-12-10 PROCEDURE — 25000003 PHARM REV CODE 250: Performed by: EMERGENCY MEDICINE

## 2020-12-10 PROCEDURE — 96366 THER/PROPH/DIAG IV INF ADDON: CPT

## 2020-12-10 PROCEDURE — 3078F DIAST BP <80 MM HG: CPT | Mod: CPTII,S$GLB,, | Performed by: SURGERY

## 2020-12-10 PROCEDURE — 3008F PR BODY MASS INDEX (BMI) DOCUMENTED: ICD-10-PCS | Mod: CPTII,S$GLB,, | Performed by: SURGERY

## 2020-12-10 PROCEDURE — 1125F AMNT PAIN NOTED PAIN PRSNT: CPT | Mod: S$GLB,,, | Performed by: SURGERY

## 2020-12-10 PROCEDURE — 93010 EKG 12-LEAD: ICD-10-PCS | Mod: ,,, | Performed by: INTERNAL MEDICINE

## 2020-12-10 PROCEDURE — 3075F SYST BP GE 130 - 139MM HG: CPT | Mod: CPTII,S$GLB,, | Performed by: SURGERY

## 2020-12-10 PROCEDURE — 3078F PR MOST RECENT DIASTOLIC BLOOD PRESSURE < 80 MM HG: ICD-10-PCS | Mod: CPTII,S$GLB,, | Performed by: SURGERY

## 2020-12-10 PROCEDURE — 93005 ELECTROCARDIOGRAM TRACING: CPT | Performed by: INTERNAL MEDICINE

## 2020-12-10 PROCEDURE — 99213 OFFICE O/P EST LOW 20 MIN: CPT | Mod: S$GLB,,, | Performed by: SURGERY

## 2020-12-10 PROCEDURE — 83690 ASSAY OF LIPASE: CPT

## 2020-12-10 PROCEDURE — 3075F PR MOST RECENT SYSTOLIC BLOOD PRESS GE 130-139MM HG: ICD-10-PCS | Mod: CPTII,S$GLB,, | Performed by: SURGERY

## 2020-12-10 PROCEDURE — 99999 PR PBB SHADOW E&M-EST. PATIENT-LVL V: CPT | Mod: PBBFAC,,, | Performed by: SURGERY

## 2020-12-10 PROCEDURE — 99999 PR PBB SHADOW E&M-EST. PATIENT-LVL V: ICD-10-PCS | Mod: PBBFAC,,, | Performed by: SURGERY

## 2020-12-10 PROCEDURE — 1125F PR PAIN SEVERITY QUANTIFIED, PAIN PRESENT: ICD-10-PCS | Mod: S$GLB,,, | Performed by: SURGERY

## 2020-12-10 PROCEDURE — 3008F BODY MASS INDEX DOCD: CPT | Mod: CPTII,S$GLB,, | Performed by: SURGERY

## 2020-12-10 PROCEDURE — 96375 TX/PRO/DX INJ NEW DRUG ADDON: CPT

## 2020-12-10 PROCEDURE — 99213 PR OFFICE/OUTPT VISIT, EST, LEVL III, 20-29 MIN: ICD-10-PCS | Mod: S$GLB,,, | Performed by: SURGERY

## 2020-12-10 PROCEDURE — 85025 COMPLETE CBC W/AUTO DIFF WBC: CPT

## 2020-12-10 PROCEDURE — 36415 COLL VENOUS BLD VENIPUNCTURE: CPT

## 2020-12-10 PROCEDURE — 63600175 PHARM REV CODE 636 W HCPCS: Performed by: EMERGENCY MEDICINE

## 2020-12-10 PROCEDURE — 93010 ELECTROCARDIOGRAM REPORT: CPT | Mod: ,,, | Performed by: INTERNAL MEDICINE

## 2020-12-10 RX ORDER — SULINDAC 200 MG/1
200 TABLET ORAL 2 TIMES DAILY
COMMUNITY

## 2020-12-10 RX ORDER — ASPIRIN 325 MG
325 TABLET ORAL
Status: COMPLETED | OUTPATIENT
Start: 2020-12-10 | End: 2020-12-10

## 2020-12-10 RX ORDER — SUMATRIPTAN SUCCINATE 100 MG/1
100 TABLET ORAL
COMMUNITY

## 2020-12-10 RX ORDER — PROMETHAZINE HYDROCHLORIDE 25 MG/1
25 TABLET ORAL EVERY 6 HOURS PRN
Qty: 15 TABLET | Refills: 0 | Status: SHIPPED | OUTPATIENT
Start: 2020-12-10 | End: 2021-01-31

## 2020-12-10 RX ORDER — GABAPENTIN 300 MG/1
300 CAPSULE ORAL 2 TIMES DAILY
COMMUNITY
Start: 2019-12-19

## 2020-12-10 RX ORDER — AMOXICILLIN 500 MG/1
500 CAPSULE ORAL
COMMUNITY

## 2020-12-10 RX ORDER — BUTALBITAL, ACETAMINOPHEN AND CAFFEINE 50; 325; 40 MG/1; MG/1; MG/1
1 TABLET ORAL EVERY 4 HOURS PRN
Qty: 20 TABLET | Refills: 0 | Status: SHIPPED | OUTPATIENT
Start: 2020-12-10 | End: 2021-01-09

## 2020-12-10 RX ORDER — PROCHLORPERAZINE EDISYLATE 5 MG/ML
10 INJECTION INTRAMUSCULAR; INTRAVENOUS
Status: COMPLETED | OUTPATIENT
Start: 2020-12-10 | End: 2020-12-10

## 2020-12-10 RX ORDER — HYDROMORPHONE HYDROCHLORIDE 1 MG/ML
1 INJECTION, SOLUTION INTRAMUSCULAR; INTRAVENOUS; SUBCUTANEOUS
Status: COMPLETED | OUTPATIENT
Start: 2020-12-10 | End: 2020-12-10

## 2020-12-10 RX ORDER — FAMOTIDINE 20 MG/1
20 TABLET, FILM COATED ORAL 2 TIMES DAILY
Qty: 20 TABLET | Refills: 0 | Status: SHIPPED | OUTPATIENT
Start: 2020-12-10 | End: 2022-05-06

## 2020-12-10 RX ORDER — TIZANIDINE 4 MG/1
4 TABLET ORAL 2 TIMES DAILY
COMMUNITY
Start: 2020-10-23

## 2020-12-10 RX ORDER — DIPHENHYDRAMINE HYDROCHLORIDE 50 MG/ML
25 INJECTION INTRAMUSCULAR; INTRAVENOUS
Status: COMPLETED | OUTPATIENT
Start: 2020-12-10 | End: 2020-12-10

## 2020-12-10 RX ORDER — CEFUROXIME AXETIL 250 MG/1
6 TABLET ORAL
COMMUNITY

## 2020-12-10 RX ORDER — ONDANSETRON 4 MG/1
4 TABLET, ORALLY DISINTEGRATING ORAL EVERY 6 HOURS PRN
Qty: 30 TABLET | Refills: 3 | Status: SHIPPED | OUTPATIENT
Start: 2020-12-10

## 2020-12-10 RX ORDER — PANTOPRAZOLE SODIUM 40 MG/10ML
40 INJECTION, POWDER, LYOPHILIZED, FOR SOLUTION INTRAVENOUS
Status: COMPLETED | OUTPATIENT
Start: 2020-12-10 | End: 2020-12-10

## 2020-12-10 RX ORDER — OLOPATADINE HYDROCHLORIDE 2 MG/ML
1 SOLUTION/ DROPS OPHTHALMIC DAILY
COMMUNITY

## 2020-12-10 RX ADMIN — SODIUM CHLORIDE 1000 ML: 0.9 INJECTION, SOLUTION INTRAVENOUS at 05:12

## 2020-12-10 RX ADMIN — SODIUM CHLORIDE 500 ML: 0.9 INJECTION, SOLUTION INTRAVENOUS at 05:12

## 2020-12-10 RX ADMIN — PROCHLORPERAZINE EDISYLATE 10 MG: 5 INJECTION INTRAMUSCULAR; INTRAVENOUS at 05:12

## 2020-12-10 RX ADMIN — PROMETHAZINE HYDROCHLORIDE 12.5 MG: 25 INJECTION INTRAMUSCULAR; INTRAVENOUS at 05:12

## 2020-12-10 RX ADMIN — DICYCLOMINE HYDROCHLORIDE 50 ML: 10 SOLUTION ORAL at 05:12

## 2020-12-10 RX ADMIN — ASPIRIN 325 MG ORAL TABLET 325 MG: 325 PILL ORAL at 05:12

## 2020-12-10 RX ADMIN — DIPHENHYDRAMINE HYDROCHLORIDE 25 MG: 50 INJECTION INTRAMUSCULAR; INTRAVENOUS at 05:12

## 2020-12-10 RX ADMIN — HYDROMORPHONE HYDROCHLORIDE 1 MG: 1 INJECTION, SOLUTION INTRAMUSCULAR; INTRAVENOUS; SUBCUTANEOUS at 07:12

## 2020-12-10 RX ADMIN — PANTOPRAZOLE SODIUM 40 MG: 40 INJECTION, POWDER, LYOPHILIZED, FOR SOLUTION INTRAVENOUS at 05:12

## 2020-12-10 NOTE — PROGRESS NOTES
Post Op Note    Surgery: gastric bypass surgery  Date: 5/16/16  Initial weight: 248  Last weight: 142  Current weight: 142    Still getting nauseated and vomits   Gets reflux and heartburn- nothing improves or worsens- dexilant sorta helps but comes back     Diet:    Doing well with eating  Fluctuates maybe 10 pounds  No sodas    Exercise:  none    MVI: [place] mvi intermittently    Vitals:    12/10/20 1013   BP: 132/69   Pulse: 71   Resp: 16   Temp: 97.3 °F (36.3 °C)       Body comp:  Fat Percent:  29.1 %  Fat Mass:  41.4 lb  FFM:  100.8 lb  TBW: 70 lb  TBW %:  49.2 %  BMR: 1352 kcal    PE:  NAD  RRR  Soft/nt/nd    Labs: none    A/P: s/p gastric bypass    Chronic nausea and vomiting after the surgery.  She does relate that she has struggled with reflux since before the surgery and occasional nausea.  She has been struggling with this for quite some time and has tailored her diet around avoiding the vomiting and taking medications to avoid the nausea.  I will refill her Zofran for now and plan to continue workup which includes an upper GI and CT scan.  She has trouble with this for quite some time and I am unclear as this is representative of a complication from the bypass but we will investigate this with further workup.  Her endoscopy has been reviewed and there is no obvious cause as to her nausea and vomiting on the endoscopy.     Counseling for patient:    Diet:  She is doing very well with her dieting controlling her weight and should continue this.  Exercise:  For her overall health I think she needs to do at least some exercising around 20 min 3 times per week  Vitamins:  Needs to get back on a daily multivitamin Flintstones are okay for now would check vitamin labs.    Co morbidities:     1. Epigastric pain  CBC w/ Auto Differential    CMP    B1    ondansetron (ZOFRAN-ODT) 4 MG TbDL    FL Upper GI to include esophagram    CT Abdomen Pelvis  Without Contrast   2. B12 deficiency  B12   3. Iron deficiency   Iron Studies   4. Vitamin D deficiency  Vitamin D 25 Hydroxy   5. Dyslipidemia  Lipid Panel       -All above: Evaluated, monitored, and treated with diet and exercise program.        : I met with the patient for 15 minutes and counseled her for over 50% of that time

## 2020-12-10 NOTE — ED PROVIDER NOTES
Encounter Date: 12/10/2020       History     Chief Complaint   Patient presents with    Chest Pain     50-year-old female with history of migraines presented emergency department with 1 week of headache consistent with her migraine headache.  Patient said the headache is not improving and is constant and rates 10/10.  Patient denies photophobia neck stiffness.  Denies fever or chills.  Patient states he has nausea and vomiting and she states it is secondary to migraine and this was going on for the past week and over the past 2 days has heaviness in the chest and pressure in the chest constantly for past 2 days.  Patient could not tell me if anything makes it better or worse.  Denies dysuria or hematuria weakness or numbness or fever or chills.        Review of patient's allergies indicates:   Allergen Reactions    Ace inhibitors Other (See Comments)     Cough    Morphine Other (See Comments)     headache     Past Medical History:   Diagnosis Date    Arthritis     bilateral knees and hands    Asthma     Bipolar 1 disorder     Diabetes mellitus     Hiatal hernia     HLD (hyperlipidemia)     Migraine headache     Morbid obesity 2016    S/P gastric bypass 2016    by     Shoulder injury     left shoulder rotator cuff repair     Past Surgical History:   Procedure Laterality Date     SECTION      COLONOSCOPY N/A 2019    Procedure: COLONOSCOPY;  Surgeon: Dudley Araiza MD;  Location: [facility];  Service: Endoscopy;  Laterality: N/A;    ESOPHAGOGASTRODUODENOSCOPY N/A 2019    Procedure: EGD (ESOPHAGOGASTRODUODENOSCOPY);  Surgeon: Dudley Araiza MD;  Location: [facility];  Service: Endoscopy;  Laterality: N/A;    GASTRIC BYPASS  2016        HYSTERECTOMY      INTRALUMINAL GASTROINTESTINAL TRACT IMAGING VIA CAPSULE N/A 2019    Procedure: IMAGING PROCEDURE, GI TRACT, INTRALUMINAL, VIA CAPSULE;  Surgeon: Dudley Araiza MD;  Location: [facility];   Service: Endoscopy;  Laterality: N/A;    JOINT REPLACEMENT      bilateral knees    ROTATOR CUFF REPAIR Left 2016    TOTAL KNEE ARTHROPLASTY Bilateral 2015     Family History   Problem Relation Age of Onset    Diabetes Mother     Hyperlipidemia Mother     Hypertension Mother     Heart disease Father     Hypertension Brother     Breast cancer Sister      Social History     Tobacco Use    Smoking status: Never Smoker    Smokeless tobacco: Never Used   Substance Use Topics    Alcohol use: No    Drug use: No     Review of Systems   Constitutional: Positive for fatigue.   HENT: Negative.    Eyes: Negative.    Respiratory: Negative.    Cardiovascular: Positive for chest pain.   Gastrointestinal: Positive for nausea and vomiting.   Endocrine: Negative.    Genitourinary: Negative.    Musculoskeletal: Negative.    Skin: Negative.    Allergic/Immunologic: Negative.    Neurological: Positive for headaches.   Hematological: Negative.    Psychiatric/Behavioral: Negative.    All other systems reviewed and are negative.      Physical Exam     Initial Vitals [12/10/20 1439]   BP Pulse Resp Temp SpO2   (!) 143/69 66 17 98.8 °F (37.1 °C) 100 %      MAP       --         Physical Exam    Nursing note and vitals reviewed.  Constitutional: She appears well-developed and well-nourished.   HENT:   Head: Normocephalic and atraumatic.   Nose: Nose normal.   Mouth/Throat: Oropharynx is clear and moist.   Eyes: Conjunctivae and EOM are normal. Pupils are equal, round, and reactive to light.   Neck: Normal range of motion. Neck supple. No thyromegaly present. No tracheal deviation present. No JVD present.   Cardiovascular: Normal rate, regular rhythm, normal heart sounds and intact distal pulses.   No murmur heard.  Pulmonary/Chest: Breath sounds normal. No stridor. No respiratory distress. She has no wheezes. She has no rales.   Abdominal: Soft. Bowel sounds are normal. She exhibits no distension. There is no abdominal tenderness.    Musculoskeletal: Normal range of motion. No edema.   Neurological: She is alert and oriented to person, place, and time. She has normal strength. No cranial nerve deficit or sensory deficit. GCS score is 15. GCS eye subscore is 4. GCS verbal subscore is 5. GCS motor subscore is 6.   Skin: Skin is warm. Capillary refill takes less than 2 seconds.   Psychiatric: She has a normal mood and affect. Thought content normal.         ED Course   Procedures  Labs Reviewed   CBC W/ AUTO DIFFERENTIAL - Abnormal; Notable for the following components:       Result Value    RBC 3.44 (*)     Hemoglobin 10.4 (*)     Hematocrit 34.3 (*)      (*)     MCHC 30.3 (*)     All other components within normal limits   COMPREHENSIVE METABOLIC PANEL - Abnormal; Notable for the following components:    Glucose 115 (*)     Calcium 7.8 (*)     Anion Gap 5 (*)     All other components within normal limits   B-TYPE NATRIURETIC PEPTIDE - Abnormal; Notable for the following components:     (*)     All other components within normal limits   TROPONIN I   LIPASE   SARS-COV-2 RNA AMPLIFICATION, QUAL   TROPONIN I     EKG Readings: (Independently Interpreted)   Rhythm: Normal Sinus Rhythm. Ectopy: No Ectopy. Conduction: Normal. ST Segments: Normal ST Segments. T Waves: Normal. Clinical Impression: Normal Sinus Rhythm     ECG Results          EKG 12-lead (In process)  Result time 12/10/20 15:21:48    In process by Interface, Lab In [facility] (12/10/20 15:21:48)                 Narrative:    Test Reason : R07.9,    Vent. Rate : 068 BPM     Atrial Rate : 068 BPM     P-R Int : 162 ms          QRS Dur : 102 ms      QT Int : 404 ms       P-R-T Axes : 075 -08 058 degrees     QTc Int : 429 ms    Normal sinus rhythm  Normal ECG  When compared with ECG of 27-JUL-2017 09:16,  No significant change was found    Referred By: [email or web address]   SELF           Confirmed By:                             Imaging Results          CT Head Without Contrast (Final  result)  Result time 12/10/20 15:27:55    Final result by Reinier Loaiza MD (12/10/20 15:27:55)                 Narrative:    CMS MANDATED QUALITY DATA - CT RADIATION - 436    All CT scans at this facility utilize dose modulation, iterative  reconstruction, and/or weight based dosing when appropriate to reduce  radiation dose to as low as reasonably achievable.        Reason: Headache, acute, normal neuro exam    TECHNIQUE: Head CT without IV contrast.    COMPARISON: None    FINDINGS:    Gray-white differentiation is maintained without hemorrhage, midline  shift, or mass effect.    The ventricles and cisterns are maintained.    Calvarium is intact. A mucous retention cyst versus polyp is noted in  the left maxillary sinus otherwise the paranasal sinuses are clear.    IMPRESSION:    No acute intracranial abnormality.        Electronically Signed by Reinier Loaiza on 12/10/2020 3:34 PM                             X-Ray Chest PA And Lateral (Final result)  Result time 12/10/20 15:08:52    Final result by Roshan Stevens MD (12/10/20 15:08:52)                 Narrative:    CHEST PA AND LATERAL    CLINICAL DATA:Chest pain. Comparison April 2015.    FINDINGS: PA and lateral views demonstrate no cardiac, pulmonary, or  osseous abnormalities.    IMPRESSION:  1. Normal two-view chest.    Electronically Signed by Sanchez Stevens M.D. on 12/10/2020 3:16 PM                               Medical Decision Making:   Differential Diagnosis:   Patient with the migraine headache for the past week and now having nausea and vomiting over the past 2 days along with chest pain.  Patient's symptoms consistent with migraine headache along with gastroesophageal reflux, causing chest pain.  Head CT unremarkable and screening labs reviewed and are within normal limits.  EKG unremarkable.  Patient's symptoms completely resolved with treatment and as feeling better and tolerating oral fluids discharged with instructions and follow up with  primary care and Neurology and Cardiology as outpatient  Clinical Tests:   Lab Tests: Reviewed  Radiological Study: Reviewed  Medical Tests: Reviewed                             Clinical Impression:       ICD-10-CM ICD-9-CM   1. Migraine without aura and without status migrainosus, not intractable  G43.009 346.10   2. Chest pain  R07.9 786.50                          ED Disposition Condition    Discharge Stable        ED Prescriptions     Medication Sig Dispense Start Date End Date Auth. Provider    butalbital-acetaminophen-caffeine -40 mg (FIORICET, ESGIC) -40 mg per tablet Take 1 tablet by mouth every 4 (four) hours as needed for Pain. 20 tablet 12/10/2020 1/9/2021 Vladimir Hernandez MD    famotidine (PEPCID) 20 MG tablet Take 1 tablet (20 mg total) by mouth 2 (two) times daily. 20 tablet 12/10/2020 12/10/2021 Vladimir Hernandez MD    promethazine (PHENERGAN) 25 MG tablet Take 1 tablet (25 mg total) by mouth every 6 (six) hours as needed for Nausea. 15 tablet 12/10/2020  Vladimir Hernandez MD    promethazine (PHENERGAN) 25 MG tablet Take 1 tablet (25 mg total) by mouth every 6 (six) hours as needed for Nausea. 15 tablet 12/10/2020  Vladimir Hernandez MD        Follow-up Information     Follow up With Specialties Details Why Contact Info    Dru Bee MD Internal Medicine In 2 days  [address] [facility] [place] [address] [phone]      Daryl Guerrero MD Cardiovascular Disease, Cardiology, INTERVENTIONAL CARDIOLOGY In 2 days  [address]  [address]  [place] [address]  [phone]      Kashmir Mills MD Vascular Neurology, Neurology In 2 days  [address]  [address]  [place] [address]  [phone]                                         Vladimir Hernandez MD  12/10/20 194

## 2020-12-11 NOTE — DISCHARGE INSTRUCTIONS
Drink lots of fluids.  Rest.  Return to emergency department for worsening symptoms or any problems.  You must follow up with primary care provider and cardiologist for outpatient stress test

## 2021-01-04 ENCOUNTER — TELEPHONE (OUTPATIENT)
Dept: BARIATRICS | Facility: CLINIC | Age: 51
End: 2021-01-04

## 2021-01-11 NOTE — NURSING
"Patient now states "If you dont get a hold of the Dr Bedoya going to walk out".   Another message left for Dr Jo to call  " RESPIRATORY THERAPY ASSESSMENT    Name:  Katarina Pena  Medical Record Number:  [id number]  Age: 57 y.o. Gender: male  : 1959  Today's Date:  2021  Room:  [address] [place], [place] [address]    Assessment   Patient Admission Diagnosis      Allergies  Allergies   Allergen Reactions    Pcn [Penicillins] Hives       Minimum Predicted Vital Capacity:     N/A          Actual Vital Capacity:      N/A          Pulmonary History: CHF   Home Oxygen Therapy:  room air  Home Respiratory Therapy: Albuterol MDI PRN  Current Respiratory Therapy:  Duoneb HHN 4x Daily          Respiratory Severity Index(RSI)   Patients with orders for inhalation medications, oxygen, or any therapeutic treatment modality will be placed on Respiratory Protocol. They will be assessed with the first treatment and at least every 72 hours thereafter. The following severity scale will be used to determine frequency of treatment intervention.     Smoking History: Pulmonary Disease or Smoking History, Greater than 15 pack year = 2    Social History  Social History     Tobacco Use    Smoking status: Current Some Day Smoker     Packs/day: 1.00     Years: 40.00     Pack years: 40.00     Types: Cigarettes    Smokeless tobacco: Former User     Types: Chew    Tobacco comment: attempting to quit at this time   Substance Use Topics    Alcohol use: No     Comment: none    Drug use: Yes     Types: Marijuana       Recent Surgical History: None = 0  Past Surgical History  Past Surgical History:   Procedure Laterality Date    IR TUNNELED CATHETER PLACEMENT GREATER THAN 5 YEARS  2021    IR TUNNELED CATHETER PLACEMENT GREATER THAN 5 YEARS 2021 Brett Yi MD [facility] SPECIAL PROCEDURES    LEG AMPUTATION BELOW KNEE      PARACENTESIS      UPPER GASTROINTESTINAL ENDOSCOPY N/A 2019    EGD ESOPHAGOGASTRODUODENOSCOPY performed by Luis Eduardo Velez MD at [address] [place] [address]       Level of Consciousness: Alert, Oriented, and Nebulizer [facility]) to patients when ANY of the following criteria are met  a. Incognizant or uncooperative          b. Patients treated with HHN at Home        c. Unable to demonstrate proper MDI or HFA technique     d. RR > 30 bpm   5. Bronchodilators will be delivered via Metered Dose Inhaler (MDI) or Hydrofluoroalkane (HFA) with spacer to intubated patients on mechanical ventilation. 6. Inhalation medication orders will be delivered and/or substituted as outlined below. Aerosolized Medications Ordering and Administration Guidelines:    1. All Medications will be ordered by a physician, and their frequency and/or modality will be adjusted as defined by the patients Respiratory Severity Index (RSI) score. 2. If the patient does not have documented COPD, consider discontinuing anticholinergics when RSI is less than 9.  3. If the bronchospasm worsens (increased RSI), then the bronchodilator frequency can be increased to a maximum of every 4 hours. If greater than every 4 hours is required, the physician will be contacted. 4. If the bronchospasm improves, the frequency of the bronchodilator can be decreased, based on the patient's RSI, but not less than home treatment regimen frequency. 5. Bronchodilator(s) will be discontinued if patient has a RSI less than 9 and has received no scheduled or as needed treatment for 72  Hrs. Patients Ordered on a Mucolytic Agent:    1. Must always be administered with a bronchodilator. 2. Discontinue if patient experiences worsened bronchospasm, or secretions have lessened to the point that the patient is able to clear them with a cough. Anti-inflammatory and Combination Medications:    1. If the patient lacks prior history of lung disease, is not using inhaled anti-inflammatory medication at home, and lacks wheezing by examination or by history for at least 24 hours, contact physician for possible discontinuation.

## 2021-01-20 ENCOUNTER — HOSPITAL ENCOUNTER (OUTPATIENT)
Dept: RADIOLOGY | Facility: HOSPITAL | Age: 51
Discharge: HOME | End: 2021-01-20
Attending: SURGERY
Payer: MEDICARE

## 2021-01-20 DIAGNOSIS — R10.13 EPIGASTRIC PAIN: ICD-10-CM

## 2021-01-20 PROCEDURE — 74240 X-RAY XM UPR GI TRC 1CNTRST: CPT | Mod: TC,FY

## 2021-01-20 PROCEDURE — A9698 NON-RAD CONTRAST MATERIALNOC: HCPCS | Performed by: SURGERY

## 2021-01-20 PROCEDURE — 74240 FL UPPER GI TO INCLUDE ESOPHAGRAM: ICD-10-PCS | Mod: 26,,, | Performed by: RADIOLOGY

## 2021-01-20 PROCEDURE — 74240 X-RAY XM UPR GI TRC 1CNTRST: CPT | Mod: 26,,, | Performed by: RADIOLOGY

## 2021-01-20 PROCEDURE — 25500020 PHARM REV CODE 255: Performed by: SURGERY

## 2021-01-20 RX ADMIN — BARIUM SULFATE 175 ML: 0.6 SUSPENSION ORAL at 09:01

## 2021-01-22 ENCOUNTER — PATIENT MESSAGE (OUTPATIENT)
Dept: BARIATRICS | Facility: CLINIC | Age: 51
End: 2021-01-22

## 2021-01-25 ENCOUNTER — PATIENT MESSAGE (OUTPATIENT)
Dept: BARIATRICS | Facility: CLINIC | Age: 51
End: 2021-01-25

## 2021-01-28 ENCOUNTER — TELEPHONE (OUTPATIENT)
Dept: SURGERY | Facility: HOSPITAL | Age: 51
End: 2021-01-28
Payer: MEDICARE

## 2021-01-28 ENCOUNTER — PATIENT MESSAGE (OUTPATIENT)
Dept: BARIATRICS | Facility: CLINIC | Age: 51
End: 2021-01-28

## 2021-01-28 NOTE — TELEPHONE ENCOUNTER
Reviewed ugi with her  She had CT at [place] and I do not have results  She will make appointment and bring the CT to me.  She is doing well right now

## 2021-01-31 ENCOUNTER — HOSPITAL ENCOUNTER (EMERGENCY)
Facility: HOSPITAL | Age: 51
Discharge: HOME | End: 2021-01-31
Attending: EMERGENCY MEDICINE
Payer: MEDICARE

## 2021-01-31 VITALS
RESPIRATION RATE: 16 BRPM | WEIGHT: 135 LBS | SYSTOLIC BLOOD PRESSURE: 140 MMHG | HEIGHT: 65 IN | BODY MASS INDEX: 22.49 KG/M2 | HEART RATE: 70 BPM | OXYGEN SATURATION: 100 % | TEMPERATURE: 98 F | DIASTOLIC BLOOD PRESSURE: 62 MMHG

## 2021-01-31 DIAGNOSIS — G43.809 OTHER MIGRAINE WITHOUT STATUS MIGRAINOSUS, NOT INTRACTABLE: Primary | ICD-10-CM

## 2021-01-31 LAB
ALBUMIN SERPL BCP-MCNC: 3.2 G/DL (ref 3.5–5.2)
ALP SERPL-CCNC: 71 U/L (ref 55–135)
ALT SERPL W/O P-5'-P-CCNC: 12 U/L (ref 10–44)
AMPHET+METHAMPHET UR QL: NEGATIVE
ANION GAP SERPL CALC-SCNC: 6 MMOL/L (ref 8–16)
AST SERPL-CCNC: 13 U/L (ref 10–40)
BARBITURATES UR QL SCN>200 NG/ML: NEGATIVE
BASOPHILS # BLD AUTO: 0.02 K/UL (ref 0–0.2)
BASOPHILS NFR BLD: 0.5 % (ref 0–1.9)
BENZODIAZ UR QL SCN>200 NG/ML: NEGATIVE
BILIRUB SERPL-MCNC: 0.3 MG/DL (ref 0.1–1)
BILIRUB UR QL STRIP: NEGATIVE
BUN SERPL-MCNC: 9 MG/DL (ref 6–20)
BZE UR QL SCN: NEGATIVE
CALCIUM SERPL-MCNC: 8.3 MG/DL (ref 8.7–10.5)
CANNABINOIDS UR QL SCN: NEGATIVE
CHLORIDE SERPL-SCNC: 106 MMOL/L (ref 95–110)
CK SERPL-CCNC: 34 U/L (ref 20–180)
CLARITY UR: CLEAR
CO2 SERPL-SCNC: 28 MMOL/L (ref 23–29)
COLOR UR: YELLOW
CREAT SERPL-MCNC: 0.6 MG/DL (ref 0.5–1.4)
CREAT UR-MCNC: 22 MG/DL (ref 15–325)
DIFFERENTIAL METHOD: ABNORMAL
EOSINOPHIL # BLD AUTO: 0 K/UL (ref 0–0.5)
EOSINOPHIL NFR BLD: 0.8 % (ref 0–8)
ERYTHROCYTE [DISTWIDTH] IN BLOOD BY AUTOMATED COUNT: 14.2 % (ref 11.5–14.5)
EST. GFR  (AFRICAN AMERICAN): >60 ML/MIN/1.73 M^2
EST. GFR  (NON AFRICAN AMERICAN): >60 ML/MIN/1.73 M^2
GLUCOSE SERPL-MCNC: 95 MG/DL (ref 70–110)
GLUCOSE UR QL STRIP: NEGATIVE
HCT VFR BLD AUTO: 33 % (ref 37–48.5)
HGB BLD-MCNC: 10 G/DL (ref 12–16)
HGB UR QL STRIP: NEGATIVE
IMM GRANULOCYTES # BLD AUTO: 0.01 K/UL (ref 0–0.04)
IMM GRANULOCYTES NFR BLD AUTO: 0.3 % (ref 0–0.5)
KETONES UR QL STRIP: NEGATIVE
LEUKOCYTE ESTERASE UR QL STRIP: NEGATIVE
LYMPHOCYTES # BLD AUTO: 1.5 K/UL (ref 1–4.8)
LYMPHOCYTES NFR BLD: 40.1 % (ref 18–48)
MAGNESIUM SERPL-MCNC: 2.3 MG/DL (ref 1.6–2.6)
MCH RBC QN AUTO: 29.2 PG (ref 27–31)
MCHC RBC AUTO-ENTMCNC: 30.3 G/DL (ref 32–36)
MCV RBC AUTO: 97 FL (ref 82–98)
MONOCYTES # BLD AUTO: 0.4 K/UL (ref 0.3–1)
MONOCYTES NFR BLD: 9.5 % (ref 4–15)
NEUTROPHILS # BLD AUTO: 1.8 K/UL (ref 1.8–7.7)
NEUTROPHILS NFR BLD: 48.8 % (ref 38–73)
NITRITE UR QL STRIP: NEGATIVE
NRBC BLD-RTO: 0 /100 WBC
OPIATES UR QL SCN: NEGATIVE
PCP UR QL SCN>25 NG/ML: NEGATIVE
PH UR STRIP: 8 [PH] (ref 5–8)
PLATELET # BLD AUTO: 282 K/UL (ref 150–350)
PMV BLD AUTO: 10.5 FL (ref 9.2–12.9)
POTASSIUM SERPL-SCNC: 4 MMOL/L (ref 3.5–5.1)
PROT SERPL-MCNC: 6.3 G/DL (ref 6–8.4)
PROT UR QL STRIP: NEGATIVE
RBC # BLD AUTO: 3.42 M/UL (ref 4–5.4)
SARS-COV-2 RDRP RESP QL NAA+PROBE: NEGATIVE
SODIUM SERPL-SCNC: 140 MMOL/L (ref 136–145)
SP GR UR STRIP: 1.01 (ref 1–1.03)
TOXICOLOGY INFORMATION: NORMAL
TSH SERPL DL<=0.005 MIU/L-ACNC: 0.99 UIU/ML (ref 0.34–5.6)
URN SPEC COLLECT METH UR: NORMAL
UROBILINOGEN UR STRIP-ACNC: NEGATIVE EU/DL
WBC # BLD AUTO: 3.77 K/UL (ref 3.9–12.7)

## 2021-01-31 PROCEDURE — 84443 ASSAY THYROID STIM HORMONE: CPT

## 2021-01-31 PROCEDURE — 63600175 PHARM REV CODE 636 W HCPCS: Performed by: EMERGENCY MEDICINE

## 2021-01-31 PROCEDURE — 80053 COMPREHEN METABOLIC PANEL: CPT

## 2021-01-31 PROCEDURE — 80307 DRUG TEST PRSMV CHEM ANLYZR: CPT

## 2021-01-31 PROCEDURE — 25000003 PHARM REV CODE 250: Performed by: EMERGENCY MEDICINE

## 2021-01-31 PROCEDURE — 99284 EMERGENCY DEPT VISIT MOD MDM: CPT | Mod: 25

## 2021-01-31 PROCEDURE — U0002 COVID-19 LAB TEST NON-CDC: HCPCS

## 2021-01-31 PROCEDURE — 81003 URINALYSIS AUTO W/O SCOPE: CPT | Mod: 59

## 2021-01-31 PROCEDURE — 83735 ASSAY OF MAGNESIUM: CPT

## 2021-01-31 PROCEDURE — 96365 THER/PROPH/DIAG IV INF INIT: CPT

## 2021-01-31 PROCEDURE — 96361 HYDRATE IV INFUSION ADD-ON: CPT

## 2021-01-31 PROCEDURE — 96376 TX/PRO/DX INJ SAME DRUG ADON: CPT

## 2021-01-31 PROCEDURE — 96375 TX/PRO/DX INJ NEW DRUG ADDON: CPT

## 2021-01-31 PROCEDURE — 85025 COMPLETE CBC W/AUTO DIFF WBC: CPT

## 2021-01-31 PROCEDURE — 82550 ASSAY OF CK (CPK): CPT

## 2021-01-31 RX ORDER — PROCHLORPERAZINE 25 MG
25 SUPPOSITORY, RECTAL RECTAL EVERY 12 HOURS PRN
Qty: 8 SUPPOSITORY | Refills: 0 | Status: SHIPPED | OUTPATIENT
Start: 2021-01-31 | End: 2022-05-03

## 2021-01-31 RX ORDER — BUTALBITAL, ACETAMINOPHEN AND CAFFEINE 50; 325; 40 MG/1; MG/1; MG/1
1 TABLET ORAL EVERY 4 HOURS PRN
Status: DISCONTINUED | OUTPATIENT
Start: 2021-01-31 | End: 2021-01-31 | Stop reason: HOSPADM

## 2021-01-31 RX ORDER — BUSPIRONE HYDROCHLORIDE 15 MG/1
15 TABLET ORAL 2 TIMES DAILY
COMMUNITY
Start: 2021-01-15 | End: 2022-05-06

## 2021-01-31 RX ORDER — FENTANYL CITRATE 50 UG/ML
25 INJECTION, SOLUTION INTRAMUSCULAR; INTRAVENOUS
Status: COMPLETED | OUTPATIENT
Start: 2021-01-31 | End: 2021-01-31

## 2021-01-31 RX ORDER — UBROGEPANT 50 MG/1
50 TABLET ORAL ONCE AS NEEDED
COMMUNITY
Start: 2021-01-12 | End: 2022-05-06

## 2021-01-31 RX ORDER — PROCHLORPERAZINE 25 MG
25 SUPPOSITORY, RECTAL RECTAL EVERY 12 HOURS PRN
Qty: 8 SUPPOSITORY | Refills: 0 | Status: SHIPPED | OUTPATIENT
Start: 2021-01-31 | End: 2021-01-31 | Stop reason: SDUPTHER

## 2021-01-31 RX ADMIN — SODIUM CHLORIDE 500 ML: 0.9 INJECTION, SOLUTION INTRAVENOUS at 11:01

## 2021-01-31 RX ADMIN — PROMETHAZINE HYDROCHLORIDE 12.5 MG: 25 INJECTION INTRAMUSCULAR; INTRAVENOUS at 11:01

## 2021-01-31 RX ADMIN — FENTANYL CITRATE 25 MCG: 50 INJECTION INTRAMUSCULAR; INTRAVENOUS at 11:01

## 2021-01-31 RX ADMIN — FENTANYL CITRATE 25 MCG: 50 INJECTION INTRAMUSCULAR; INTRAVENOUS at 01:01

## 2021-01-31 RX ADMIN — BUTALBITAL, ACETAMINOPHEN AND CAFFEINE 1 TABLET: 50; 325; 40 TABLET ORAL at 12:01

## 2021-04-30 ENCOUNTER — TELEPHONE (OUTPATIENT)
Dept: BARIATRICS | Facility: CLINIC | Age: 51
End: 2021-04-30

## 2021-05-04 NOTE — DISCHARGE INSTRUCTIONS
Capsule Discharge Instructions     You have just swallowed a capsule endoscope.  This contains information about what to expect over the next 8 hours.  Please call our office if you have severe or persistent abdominal or chest pain, fever, difficulty swallowing or if you have any questions.  Our phone number is [phone].    Time Capsule ingested:_______________    · You may drink clear liquids (water, apple juice) 4 hours after swallowing the capsule.  · You may eat a light meal 6 hours after swallowing the capsule.  Medications may be resumed at 6 hours after swallowing the capsule.  · Do not exercise, avoid heavy lifting.  You may walk, sit and lay down.  You can drive a car.  You may return to work, if you work allows avoiding unsuitable environments and /or physical movements.  · Avoid going near MRI machines and radio transmitters.  You may use a computer, cell phone, radio or stereo.  · Do not stand directly next to another person undergoing capsule endoscopy.  · Try not to touch the recorder or the sensor array leads.  Do not remove the leads before 8 hours.  · Avoid getting the data recorder or sensor array leads wet.  · You may loosen the belt to allow yourself to go to the bathroom.  Do not take the belt off until the 8 hours have passed.  · Observe the LED light on the data recorder at least every 15 minutes.  If the light stops blinking, document the time and call our office.  · Return the unit to the hospital at the completion of 8 hour time frame.    May have clear liquids at ______________    May have light snack and medications at ______________    May remove the unit at ______________    Return the unit to Registration Desk at the completion of the study.    Post Capsule Instructions     This information is what to expect over the next 2 days.  Please call us or your doctor if you have severe or persistent abdominal or chest pain, fever, difficulty swallowing, or if you have any questions.   Patient tried glasses to correct vision, was unable to tolerate due to high myopia OD caused diplopia. OS has shifted more nearsighted due to cataract progression. Mild Astigmatism present OD>OS. After further testing no Toric recommended. May consider ROF and discussed the possibility of glare and halos associated with MF lenses. Our phone number is [phone].    · Pain:  Pain is uncommon following capsule endoscopy.  Should you feel sharp or persistent pain, please call your doctor's office.  · Nausea:  Nausea is also very uncommon and should it occur, please notify your doctor's office  · Diet:  You may eat and drink with no restrictions 8 hours after ingesting capsule.  · Activities:  Following the exam you may resume normal activities, including exercise.  · Medications:  You may resume your medications 6 hours after ingesting the capsule.  Do NOT make up doses you have missed, just resume your normal dosage.  · Further Testing:  Until the capsule passes, further testing which includes any type of MRI should be avoided.  If you have any type of MRI examination scheduled in the next 3 days, it should be postponed.  · The Capsule:  The capsule passes naturally in a bowel movement typically in about 24 hours.  Most likely, you will be unaware of its passage.  It does not need to be retrieved and can safely be flushed down the toilet.  Occasionally the capsule light will still be flashing when it passes.  Should you be concerned that the capsule didn't pass, in the absence of symptoms; an abdominal x-ray can be obtained after 7 days to confirm its passage.  · The capsule will make a beeping noise when finished.  It will shut off automatically.

## 2021-05-27 ENCOUNTER — HOSPITAL ENCOUNTER (EMERGENCY)
Facility: HOSPITAL | Age: 51
Discharge: LEFT BEFORE DISCHARGE | End: 2021-05-27
Payer: MEDICARE

## 2021-05-27 VITALS
TEMPERATURE: 98 F | DIASTOLIC BLOOD PRESSURE: 59 MMHG | SYSTOLIC BLOOD PRESSURE: 100 MMHG | HEIGHT: 64 IN | RESPIRATION RATE: 16 BRPM | BODY MASS INDEX: 22.2 KG/M2 | WEIGHT: 130 LBS | HEART RATE: 67 BPM | OXYGEN SATURATION: 98 %

## 2021-05-27 PROCEDURE — 99281 EMR DPT VST MAYX REQ PHY/QHP: CPT

## 2021-09-22 NOTE — ED NOTES
[facility] PHYSICAL THERAPY   [facility],[place], [address] [facility] - Phone: [phone]  Fax: [phone] / [facility]  Patient Name: Bee Chu : 1999   Medical   Diagnosis: Chronic bilateral low back pain with left-sided sciatica [M54.42, G89.29] Treatment Diagnosis: Chronic bilateral low back pain with left-sided sciatica [M54.42, G89.29]   Onset Date: 6 mo ago     Referral Source: Josselin Garzon, NP Start of Care [facility]): 2021   Prior Hospitalization: See medical history Provider #: [id number]   Prior Level of Function: Independent and sx free prolonged standing and ambulation   Comorbidities: BMI >30   Medications: Verified on Patient Summary List   The Plan of Care and following information is based on the information from the initial evaluation.   ===========================================================================================  Assessment / key information: Patient is a 20 y.o. female who presents to [facility] at [facility], [facility] with diagnosis of Chronic bilateral low back pain with left-sided sciatica [M54.42, G89.29]. Patient reports LBP began 6 mo ago with insidious onset. X-Ray imaging of the L/S was insignificant. Pt reports \"insurance will not cover MRI till I do 6 weeks of therapy so here I am\". Back pain is described as intermittent shooting pain and located on the TOMÁS side of the back. Pain is typically located in the left glute and radiates down the posterior aspect of the left leg (to the knee). Pt denies numbness and tingling in TOMÁS LEs. Pain level is rated at 0/10 at the best, 1-2/10 currently, and 10/10 at the worst. LBP increases with standing, lifting, and prolonged ambulation, decreases with sitting. Upon objective evaluation, patient demonstrates impaired and painful trunk AROM with flexion and TOMÁS SB (Flexion 85% p! NO N/V REPORTED.  STATES HA.    lower shin, Ext 75%, R/L SB 75/75% joint line, and R/L Rotation 95/100%), decr R L2/3 light touch sensation, decreased core strength (supine bridge = 75% p!), L/S CPA joint hypermobility, and impaired flexibility of TOMÁS piriformis and hamstring (R/L Ham 90/90 = 152/150 deg) musculature. (+) Positive TOMÁS SI distraction, L SI compression, R Gillet's and SI FF, L Slump, and L SLR. (-) R SI compression special testing. Observation of structure revealed incr lumbar lordotic curvature. All screening red flags and review of systems were cleared and negative. Patient can benefit from skilled PT interventions to improve L/S ROM, flexibility, core strength, decrease pain and TTP and for education on posture, body mechanics and lifting mechanics/transfers to facilitate ADL's & overall functional status/quality of life. TOMÁS QL, TOMÁS piri, L psoas, R ant innominate   L(0-5) R (0-5)   Psoas (L1,2) 5 5   Quadriceps (L3,4) 5 5   Ant Tibialis (L4) 4+ 4+   Gluteus Medius (L5) 4 4+   Hamstring (S1,2) 5 5   Gluteus Thiago (S1, S2) 4 4     ===========================================================================================  Eval Complexity: History MEDIUM  Complexity : 1-2 comorbidities / personal factors will impact the outcome/ POC ;  Examination  HIGH Complexity : 4+ Standardized tests and measures addressing body structure, function, activity limitation and / or participation in recreation ; Presentation MEDIUM Complexity : Evolving with changing characteristics ; Decision Making Other outcome measures objective  MEDIUM;  Overall Complexity MEDIUM  Problem List: pain affecting function, decrease ROM, decrease strength, edema affecting function, impaired gait/ balance, decrease ADL/ functional abilities, decrease activity tolerance, decrease flexibility/ joint mobility and decrease transfer abilities   Treatment Plan may include any combination of the following: Therapeutic exercise, Therapeutic activities, Neuromuscular re-education, Physical agent/modality, Gait/balance training, Manual therapy, Patient education, Self Care training, Functional mobility training, Home safety training and Stair training  Patient / Family readiness to learn indicated by: asking questions, trying to perform skills and interest  Persons(s) to be included in education: patient (P)  Barriers to Learning/Limitations: None  Measures taken, if barriers to learning:    Patient Goal (s): \"getting information, fixing it, no more pain\"   Patient self reported health status: fair  Rehabilitation Potential: good   Short Term Goals: To be accomplished in  2  weeks:  1) Establish HEP. 2) Patient will report decreased c/o pain to < or = 7/10 at the worst to facilitate prolonged standing with manageable sx in L/S.   Long Term Goals: To be accomplished in  6  weeks:  1) Patient to be independent & compliant with a progressive, high level HEP in order to maintain gains made in physical therapy. 2) Patient will report decreased c/o pain to < or = 5/10 at the worst to facilitate prolonged standing with manageable sx in L/S.  3) Pt will report a GROC score of +4 (a moderately better) indicating improved symptoms and function  4) Patient to perform 2 x 10 full bridges indicating improved core strength to improve ambulation around the grocery store. Frequency / Duration:   Patient to be seen  2  times per week for 6  weeks:  Patient / Caregiver education and instruction: self care, activity modification, brace/ splint application and exercises  Therapist Signature: Ladonna Garcia Date: 3/34/8941   Certification Period: n/a Time: 5:57 PM   ===========================================================================================  I certify that the above Physical Therapy Services are being furnished while the patient is under my care. I agree with the treatment plan and certify that this therapy is necessary.     Physician Signature:        Date: Time: Marcela Pulliam NP  Please sign and return to [facility] at [facility], [facility]. or you may fax the signed copy to [phone]. Thank you.

## 2022-01-01 ENCOUNTER — HOSPITAL ENCOUNTER (EMERGENCY)
Facility: HOSPITAL | Age: 52
Discharge: HOME | End: 2022-01-01
Attending: EMERGENCY MEDICINE
Payer: MEDICARE

## 2022-01-01 VITALS
HEART RATE: 72 BPM | DIASTOLIC BLOOD PRESSURE: 77 MMHG | BODY MASS INDEX: 27.49 KG/M2 | OXYGEN SATURATION: 100 % | SYSTOLIC BLOOD PRESSURE: 138 MMHG | HEIGHT: 65 IN | WEIGHT: 165 LBS | RESPIRATION RATE: 15 BRPM | TEMPERATURE: 98 F

## 2022-01-01 DIAGNOSIS — M79.672 LEFT FOOT PAIN: Primary | ICD-10-CM

## 2022-01-01 DIAGNOSIS — M25.579 ANKLE PAIN: ICD-10-CM

## 2022-01-01 PROCEDURE — 99284 EMERGENCY DEPT VISIT MOD MDM: CPT

## 2022-01-01 PROCEDURE — 96372 THER/PROPH/DIAG INJ SC/IM: CPT

## 2022-01-01 PROCEDURE — 63600175 PHARM REV CODE 636 W HCPCS: Performed by: EMERGENCY MEDICINE

## 2022-01-01 RX ORDER — NAPROXEN 500 MG/1
500 TABLET ORAL 2 TIMES DAILY WITH MEALS
Qty: 30 TABLET | Refills: 0 | Status: SHIPPED | OUTPATIENT
Start: 2022-01-01 | End: 2022-01-11

## 2022-01-01 RX ORDER — NAPROXEN 500 MG/1
500 TABLET ORAL 2 TIMES DAILY WITH MEALS
Qty: 30 TABLET | Refills: 0 | Status: SHIPPED | OUTPATIENT
Start: 2022-01-01 | End: 2022-01-01 | Stop reason: SDUPTHER

## 2022-01-01 RX ORDER — DEXAMETHASONE SODIUM PHOSPHATE 4 MG/ML
8 INJECTION, SOLUTION INTRA-ARTICULAR; INTRALESIONAL; INTRAMUSCULAR; INTRAVENOUS; SOFT TISSUE
Status: DISCONTINUED | OUTPATIENT
Start: 2022-01-01 | End: 2022-01-01

## 2022-01-01 RX ORDER — DEXAMETHASONE SODIUM PHOSPHATE 4 MG/ML
8 INJECTION, SOLUTION INTRA-ARTICULAR; INTRALESIONAL; INTRAMUSCULAR; INTRAVENOUS; SOFT TISSUE
Status: COMPLETED | OUTPATIENT
Start: 2022-01-01 | End: 2022-01-01

## 2022-01-01 RX ADMIN — DEXAMETHASONE SODIUM PHOSPHATE 8 MG: 4 INJECTION, SOLUTION INTRA-ARTICULAR; INTRALESIONAL; INTRAMUSCULAR; INTRAVENOUS; SOFT TISSUE at 09:01

## 2022-01-01 NOTE — ED NOTES
"Pt reports falling 3 times off a ladder on Dec. 21st after missing the bottom step, causing pain of the left foot. Pt was seen in an ED and told she may have a "ligament tear". Pt has not seen an orthopedic MD yet. Pt states pain is worse in left foot now w/ inceased swelling. Pt able to dorsiflex foot, but having difficulty w/ plantar flexion. No bruising noted, and slight swelling seen. Pt states she is "sometimes" weight bearing.  "

## 2022-01-01 NOTE — ED PROVIDER NOTES
Encounter Date: 2022       History     Chief Complaint   Patient presents with    Foot Injury     Reports foot injury after falling off ladder on Dec. 21st, now increased swelling and pain.      Patient presents complaining of left foot pain.  Patient experienced a fall off the ladder about 1 week ago and has had persisting pain to the ankle and foot since.  Patient received a negative x-ray recently but has persisting pain want repeat evaluation.        Review of patient's allergies indicates:   Allergen Reactions    Ace inhibitors Other (See Comments)     Cough    Morphine Other (See Comments)     headache    Compazine [prochlorperazine] Anxiety     Past Medical History:   Diagnosis Date    Arthritis     bilateral knees and hands    Asthma     Bipolar 1 disorder     Diabetes mellitus     Hiatal hernia     HLD (hyperlipidemia)     Migraine headache     Morbid obesity 2016    S/P gastric bypass 2016    by     Shoulder injury     left shoulder rotator cuff repair     Past Surgical History:   Procedure Laterality Date     SECTION      COLONOSCOPY N/A 2019    Procedure: COLONOSCOPY;  Surgeon: Dudley Araiza MD;  Location: [facility] ENDO;  Service: Endoscopy;  Laterality: N/A;    ESOPHAGOGASTRODUODENOSCOPY N/A 2019    Procedure: EGD (ESOPHAGOGASTRODUODENOSCOPY);  Surgeon: Dudley Araiza MD;  Location: [facility];  Service: Endoscopy;  Laterality: N/A;    GASTRIC BYPASS  2016        HYSTERECTOMY      INTRALUMINAL GASTROINTESTINAL TRACT IMAGING VIA CAPSULE N/A 2019    Procedure: IMAGING PROCEDURE, GI TRACT, INTRALUMINAL, VIA CAPSULE;  Surgeon: Dudley Araiza MD;  Location: [facility] ENDO;  Service: Endoscopy;  Laterality: N/A;    JOINT REPLACEMENT      bilateral knees    ROTATOR CUFF REPAIR Left 2016    TOTAL KNEE ARTHROPLASTY Bilateral      Family History   Problem Relation Age of Onset    Diabetes Mother     Hyperlipidemia Mother      Hypertension Mother     Heart disease Father     Hypertension Brother     Breast cancer Sister      Social History     Tobacco Use    Smoking status: Never Smoker    Smokeless tobacco: Never Used   Substance Use Topics    Alcohol use: No    Drug use: No     Review of Systems   All other systems reviewed and are negative.      Physical Exam     Initial Vitals [01/01/22 0910]   BP Pulse Resp Temp SpO2   138/77 72 15 97.9 °F (36.6 °C) 100 %      MAP       --         Physical Exam    Nursing note and vitals reviewed.  Constitutional: She appears well-developed and well-nourished.   Pleasant, polite   HENT:   Head: Normocephalic and atraumatic.   Eyes: EOM are normal.   Neck: Neck supple.   Normal range of motion.  Pulmonary/Chest: No respiratory distress.   Musculoskeletal:      Cervical back: Normal range of motion and neck supple.      Comments: There is full range of motion of the left ankle.  There is mild tenderness to the medial lateral malleolus and additionally into the midfoot.  There is mild edema.  There is no erythema warmth or cellulitis.  The Achilles is intact.     Neurological: She is alert and oriented to person, place, and time.   Skin: Skin is warm and dry. Capillary refill takes less than 2 seconds.   Psychiatric: She has a normal mood and affect. Her behavior is normal. Judgment and thought content normal.         ED Course   Procedures  Labs Reviewed - No data to display       Imaging Results          X-Ray Ankle Complete Left (Final result)  Result time 01/01/22 09:52:28   Procedure changed from X-Ray Ankle Complete Right     Final result by Migue Elliott MD (01/01/22 09:52:28)                 Narrative:    XR ANKLE 3 OR MORE VIEWS    CLINICAL HISTORY:  51 years Female left foot/ankle pain    COMPARISON: None    FINDINGS: Small focus of heterotopic ossification adjacent to the medial malleolus likely reflecting sequela of remote trauma. No acute fracture. Soft tissue swelling about  the ankle, most pronounced laterally. Enthesopathy of the calcaneus. Joint spaces of the hindfoot and midfoot are maintained.    IMPRESSION:    No acute osseous abnormality.  Soft tissue swelling about the lateral ankle.    Electronically signed by:  Migue Elliott MD  1/1/2022 9:52 AM CST Workstation: [id number]                             X-Ray Foot Complete Left (Final result)  Result time 01/01/22 09:53:29   Procedure changed from X-Ray Foot Complete Right     Final result by Migue Elliott MD (01/01/22 09:53:29)                 Narrative:    XR FOOT 3 OR MORE VIEWS    CLINICAL HISTORY:  51 years Female left ankle pain    COMPARISON: None    FINDINGS: No acute fracture or malalignment of the left foot. Enthesopathy of the calcaneus. Os peroneus noted. Soft tissues are unremarkable.    IMPRESSION: No acute osseous abnormality.    Electronically signed by:  Migue Elliott MD  1/1/2022 9:53 AM CST Workstation: [id number]                               Medications   dexamethasone injection 8 mg (8 mg Intramuscular Given 1/1/22 0955)     Medical Decision Making:   Initial Assessment:   No apparent distress  Differential Diagnosis:   Fracture, strain  ED Management:  Patient presents with ankle pain.  No definitive fracture on x-ray.  Advised anti-inflammatories.  Patient received Decadron shot emergency department.  Patient be discharged stable condition.  Detailed return precautions discussed.                      Clinical Impression:   Final diagnoses:  [M25.579] Ankle pain  [M79.672] Left foot pain (Primary)          ED Disposition Condition    Discharge Stable        ED Prescriptions     Medication Sig Dispense Start Date End Date Auth. Provider    naproxen (NAPROSYN) 500 MG tablet  (Status: Discontinued) Take 1 tablet (500 mg total) by mouth 2 (two) times daily with meals. for 10 days 30 tablet 1/1/2022 1/1/2022 Colin Carvajal MD    naproxen (NAPROSYN) 500 MG tablet Take 1 tablet (500 mg total) by mouth 2  (two) times daily with meals. for 10 days 30 tablet 1/1/2022 1/11/2022 Colin Carvajal MD        Follow-up Information     Follow up With Specialties Details Why Contact Info    Yessi Dent NP Family Medicine In 1 week  [address] [facility] [place] [address]  [phone]             Colin Carvajal MD  01/01/22 1039

## 2022-01-01 NOTE — ED NOTES
Pt is stable, NADN, d/c instructions reviewed w/ patient. Patient discharged from ED w/o incident.

## 2022-01-06 ENCOUNTER — PATIENT OUTREACH (OUTPATIENT)
Dept: EMERGENCY MEDICINE | Facility: HOSPITAL | Age: 52
End: 2022-01-06
Payer: MEDICARE

## 2022-01-06 NOTE — PROGRESS NOTES
ED Navigator met with patient in the ED and completed assessment. Patient denied any concerns with housing, utilities, food or transportation. Patient stated they were waiting on a appointment for an  MRI of her foot which is still giving her trouble. The following resources were provided [facility] brochure and [facility] 24/7 Nurse Triage line.

## 2022-01-14 ENCOUNTER — PATIENT MESSAGE (OUTPATIENT)
Dept: BARIATRICS | Facility: CLINIC | Age: 52
End: 2022-01-14
Payer: MEDICARE

## 2022-01-14 ENCOUNTER — TELEPHONE (OUTPATIENT)
Dept: BARIATRICS | Facility: CLINIC | Age: 52
End: 2022-01-14
Payer: MEDICARE

## 2022-01-20 ENCOUNTER — PATIENT OUTREACH (OUTPATIENT)
Dept: EMERGENCY MEDICINE | Facility: HOSPITAL | Age: 52
End: 2022-01-20
Payer: MEDICARE

## 2022-01-20 NOTE — PROGRESS NOTES
Ed navigator spoke to Bessie who advised that she is still in severe pain and has swelling. She advised that she was awaiting an appointment for an MRI and she now has two scheduled for tomorrow. She is ready and anxious. I will call to check in on her next week. She was advised she could call at any time if needed.

## 2022-01-26 ENCOUNTER — TELEPHONE (OUTPATIENT)
Dept: FAMILY MEDICINE | Facility: CLINIC | Age: 52
End: 2022-01-26
Payer: MEDICARE

## 2022-01-26 NOTE — TELEPHONE ENCOUNTER
----- Message from Dmitri Fajardo sent at 1/26/2022  9:45 AM CST -----  Contact: pt  Pt and daughters have Covid and needing advice on next steps and seeing if they need medication or not please give mother a call back     MISTY MULLEN [[id number]]  JOSEPH MISTRY [[id number]]  RICK MULLEN [[id number]]    Pt phone: [phone]

## 2022-01-26 NOTE — TELEPHONE ENCOUNTER
Pt called wanting an appointment for covid testing. No appointment available. Advised pt to go to Urgent care.

## 2022-02-04 ENCOUNTER — PATIENT OUTREACH (OUTPATIENT)
Dept: EMERGENCY MEDICINE | Facility: HOSPITAL | Age: 52
End: 2022-02-04
Payer: MEDICARE

## 2022-02-07 ENCOUNTER — PATIENT OUTREACH (OUTPATIENT)
Dept: EMERGENCY MEDICINE | Facility: HOSPITAL | Age: 52
End: 2022-02-07
Payer: MEDICARE

## 2022-02-07 NOTE — PROGRESS NOTES
ED navigator spoke to Bessie who advised that she is feeling better after isolating for two weeks after catching COVID. She talked about the struggles they had with being sick as a family. She advised that she is glad to be back into a routine. I advised her that I would call back in a couple of weeks to check in but she could call me when needed.

## 2022-02-28 ENCOUNTER — PATIENT OUTREACH (OUTPATIENT)
Dept: EMERGENCY MEDICINE | Facility: HOSPITAL | Age: 52
End: 2022-02-28
Payer: MEDICARE

## 2022-05-03 ENCOUNTER — OFFICE VISIT (OUTPATIENT)
Dept: BARIATRICS | Facility: CLINIC | Age: 52
End: 2022-05-03
Payer: MEDICARE

## 2022-05-03 ENCOUNTER — HOSPITAL ENCOUNTER (OUTPATIENT)
Dept: CARDIOLOGY | Facility: HOSPITAL | Age: 52
Discharge: HOME | End: 2022-05-03
Attending: SURGERY
Payer: MEDICARE

## 2022-05-03 VITALS
HEIGHT: 65 IN | BODY MASS INDEX: 30.59 KG/M2 | TEMPERATURE: 98 F | DIASTOLIC BLOOD PRESSURE: 71 MMHG | WEIGHT: 183.63 LBS | HEART RATE: 89 BPM | RESPIRATION RATE: 16 BRPM | SYSTOLIC BLOOD PRESSURE: 141 MMHG

## 2022-05-03 DIAGNOSIS — E66.01 MORBID OBESITY: ICD-10-CM

## 2022-05-03 DIAGNOSIS — E66.01 MORBID OBESITY: Primary | ICD-10-CM

## 2022-05-03 PROCEDURE — 3078F PR MOST RECENT DIASTOLIC BLOOD PRESSURE < 80 MM HG: ICD-10-PCS | Mod: CPTII,S$GLB,, | Performed by: SURGERY

## 2022-05-03 PROCEDURE — 3008F BODY MASS INDEX DOCD: CPT | Mod: CPTII,S$GLB,, | Performed by: SURGERY

## 2022-05-03 PROCEDURE — 3077F SYST BP >= 140 MM HG: CPT | Mod: CPTII,S$GLB,, | Performed by: SURGERY

## 2022-05-03 PROCEDURE — 1159F MED LIST DOCD IN RCRD: CPT | Mod: CPTII,S$GLB,, | Performed by: SURGERY

## 2022-05-03 PROCEDURE — 93005 ELECTROCARDIOGRAM TRACING: CPT

## 2022-05-03 PROCEDURE — 99213 OFFICE O/P EST LOW 20 MIN: CPT | Mod: S$GLB,,, | Performed by: SURGERY

## 2022-05-03 PROCEDURE — 93010 ELECTROCARDIOGRAM REPORT: CPT | Mod: ,,, | Performed by: GENERAL PRACTICE

## 2022-05-03 PROCEDURE — 99213 PR OFFICE/OUTPT VISIT, EST, LEVL III, 20-29 MIN: ICD-10-PCS | Mod: S$GLB,,, | Performed by: SURGERY

## 2022-05-03 PROCEDURE — 1159F PR MEDICATION LIST DOCUMENTED IN MEDICAL RECORD: ICD-10-PCS | Mod: CPTII,S$GLB,, | Performed by: SURGERY

## 2022-05-03 PROCEDURE — 99999 PR PBB SHADOW E&M-EST. PATIENT-LVL IV: CPT | Mod: PBBFAC,,, | Performed by: SURGERY

## 2022-05-03 PROCEDURE — 3008F PR BODY MASS INDEX (BMI) DOCUMENTED: ICD-10-PCS | Mod: CPTII,S$GLB,, | Performed by: SURGERY

## 2022-05-03 PROCEDURE — 93010 EKG 12-LEAD: ICD-10-PCS | Mod: ,,, | Performed by: GENERAL PRACTICE

## 2022-05-03 PROCEDURE — 99999 PR PBB SHADOW E&M-EST. PATIENT-LVL IV: ICD-10-PCS | Mod: PBBFAC,,, | Performed by: SURGERY

## 2022-05-03 PROCEDURE — 3078F DIAST BP <80 MM HG: CPT | Mod: CPTII,S$GLB,, | Performed by: SURGERY

## 2022-05-03 PROCEDURE — 3077F PR MOST RECENT SYSTOLIC BLOOD PRESSURE >= 140 MM HG: ICD-10-PCS | Mod: CPTII,S$GLB,, | Performed by: SURGERY

## 2022-05-03 RX ORDER — PRAMIPEXOLE DIHYDROCHLORIDE 0.25 MG/1
0.25 TABLET ORAL 2 TIMES DAILY
COMMUNITY
Start: 2022-04-14

## 2022-05-03 RX ORDER — AMITRIPTYLINE HYDROCHLORIDE 150 MG/1
150 TABLET ORAL NIGHTLY
COMMUNITY
Start: 2022-04-14

## 2022-05-03 RX ORDER — TOPIRAMATE 25 MG/1
25 TABLET ORAL 2 TIMES DAILY
Qty: 60 TABLET | Refills: 0 | Status: SHIPPED | OUTPATIENT
Start: 2022-05-03 | End: 2022-07-05 | Stop reason: SDUPTHER

## 2022-05-03 NOTE — PROGRESS NOTES
Post Op Note    Surgery: gastric bypass surgery  Date: 5/16/16  Initial weight: 248  Last weight: 142  Current weight: 183    Getting nausea still with some foods  Worried about weight gain  Wants to lose weight  Some snacking- sweets    Exercising-  Both feet hurt     Vitals:    05/03/22 1135   BP: (!) 141/71   Pulse: 89   Resp: 16   Temp: 98.2 °F (36.8 °C)       Body comp:  Fat Percent:  40.7 %  Fat Mass:  74.8 lb  FFM:  108.8 lb  TBW: 77.4 lb  TBW %:  42.2 %  BMR: 1503 kcal    PE:  NAD  RRR  Soft/nt/nd      Labs: none    Still struggling with nausea but unclear what the cause is.  She has been gaining weight.    A/P: s/p gastric bypass      Counseling for patient:    Diet:  Wants to lose weight.  We talked about getting on track with dieting.  I will try around Topamax and possibly phentermine depending on how well she does. Check ekg for possible  Exercise:  Exercise as much as possible.  Vitamins:  Daily multivitamin.  Co morbidities:     1. Morbid obesity  EKG 12-lead       Addendum- reviewed ekg- no acute problem but has some changes- will defer to pcp/cards - I have called and messaged her about this.      -All above: Evaluated, monitored, and treated with diet and exercise program.

## 2022-05-05 ENCOUNTER — PATIENT MESSAGE (OUTPATIENT)
Dept: BARIATRICS | Facility: CLINIC | Age: 52
End: 2022-05-05
Payer: MEDICARE

## 2022-06-09 ENCOUNTER — OFFICE VISIT (OUTPATIENT)
Dept: BARIATRICS | Facility: CLINIC | Age: 52
End: 2022-06-09
Payer: MEDICARE

## 2022-06-09 VITALS
RESPIRATION RATE: 16 BRPM | WEIGHT: 187.63 LBS | DIASTOLIC BLOOD PRESSURE: 83 MMHG | HEIGHT: 65 IN | BODY MASS INDEX: 31.26 KG/M2 | TEMPERATURE: 99 F | HEART RATE: 76 BPM | SYSTOLIC BLOOD PRESSURE: 139 MMHG

## 2022-06-09 DIAGNOSIS — E55.9 VITAMIN D DEFICIENCY: ICD-10-CM

## 2022-06-09 DIAGNOSIS — E78.5 DYSLIPIDEMIA: ICD-10-CM

## 2022-06-09 DIAGNOSIS — E66.01 MORBID OBESITY: Primary | ICD-10-CM

## 2022-06-09 DIAGNOSIS — E11.9 TYPE 2 DIABETES MELLITUS WITHOUT COMPLICATION, WITHOUT LONG-TERM CURRENT USE OF INSULIN: ICD-10-CM

## 2022-06-09 DIAGNOSIS — E61.1 IRON DEFICIENCY: ICD-10-CM

## 2022-06-09 PROCEDURE — 99203 OFFICE O/P NEW LOW 30 MIN: CPT | Mod: S$GLB,,, | Performed by: NURSE PRACTITIONER

## 2022-06-09 PROCEDURE — 3079F DIAST BP 80-89 MM HG: CPT | Mod: CPTII,S$GLB,, | Performed by: NURSE PRACTITIONER

## 2022-06-09 PROCEDURE — 1125F PR PAIN SEVERITY QUANTIFIED, PAIN PRESENT: ICD-10-PCS | Mod: CPTII,S$GLB,, | Performed by: NURSE PRACTITIONER

## 2022-06-09 PROCEDURE — 1159F MED LIST DOCD IN RCRD: CPT | Mod: CPTII,S$GLB,, | Performed by: NURSE PRACTITIONER

## 2022-06-09 PROCEDURE — 3079F PR MOST RECENT DIASTOLIC BLOOD PRESSURE 80-89 MM HG: ICD-10-PCS | Mod: CPTII,S$GLB,, | Performed by: NURSE PRACTITIONER

## 2022-06-09 PROCEDURE — 1159F PR MEDICATION LIST DOCUMENTED IN MEDICAL RECORD: ICD-10-PCS | Mod: CPTII,S$GLB,, | Performed by: NURSE PRACTITIONER

## 2022-06-09 PROCEDURE — 1125F AMNT PAIN NOTED PAIN PRSNT: CPT | Mod: CPTII,S$GLB,, | Performed by: NURSE PRACTITIONER

## 2022-06-09 PROCEDURE — 3008F BODY MASS INDEX DOCD: CPT | Mod: CPTII,S$GLB,, | Performed by: NURSE PRACTITIONER

## 2022-06-09 PROCEDURE — 3008F PR BODY MASS INDEX (BMI) DOCUMENTED: ICD-10-PCS | Mod: CPTII,S$GLB,, | Performed by: NURSE PRACTITIONER

## 2022-06-09 PROCEDURE — 99999 PR PBB SHADOW E&M-EST. PATIENT-LVL V: CPT | Mod: PBBFAC,,, | Performed by: NURSE PRACTITIONER

## 2022-06-09 PROCEDURE — 3075F SYST BP GE 130 - 139MM HG: CPT | Mod: CPTII,S$GLB,, | Performed by: NURSE PRACTITIONER

## 2022-06-09 PROCEDURE — 99203 PR OFFICE/OUTPT VISIT, NEW, LEVL III, 30-44 MIN: ICD-10-PCS | Mod: S$GLB,,, | Performed by: NURSE PRACTITIONER

## 2022-06-09 PROCEDURE — 99999 PR PBB SHADOW E&M-EST. PATIENT-LVL V: ICD-10-PCS | Mod: PBBFAC,,, | Performed by: NURSE PRACTITIONER

## 2022-06-09 PROCEDURE — 3075F PR MOST RECENT SYSTOLIC BLOOD PRESS GE 130-139MM HG: ICD-10-PCS | Mod: CPTII,S$GLB,, | Performed by: NURSE PRACTITIONER

## 2022-06-09 RX ORDER — PHENTERMINE HYDROCHLORIDE 37.5 MG/1
37.5 CAPSULE ORAL EVERY MORNING
Qty: 30 CAPSULE | Refills: 0 | Status: SHIPPED | OUTPATIENT
Start: 2022-06-09 | End: 2022-07-11

## 2022-06-09 RX ORDER — FLUTICASONE PROPIONATE AND SALMETEROL 100; 50 UG/1; UG/1
1 POWDER RESPIRATORY (INHALATION) 2 TIMES DAILY
COMMUNITY
Start: 2022-05-16

## 2022-06-10 ENCOUNTER — PATIENT MESSAGE (OUTPATIENT)
Dept: BARIATRICS | Facility: CLINIC | Age: 52
End: 2022-06-10
Payer: MEDICARE

## 2022-06-10 ENCOUNTER — TELEPHONE (OUTPATIENT)
Dept: BARIATRICS | Facility: CLINIC | Age: 52
End: 2022-06-10
Payer: MEDICARE

## 2022-06-10 NOTE — PROGRESS NOTES
Bariatric Surgery Post Op Note    Surgery: gastric bypass surgery  Date: 2016  Initial weight: 248 lbs   Lowest recorded-130 lbs  Last weight: 183 lbs  Current weight: 187 lbs    Wt Readings from Last 10 Encounters:   22 85.1 kg (187 lb 9.6 oz)   22 83.3 kg (183 lb 10.1 oz)   22 74.8 kg (165 lb)   21 59 kg (130 lb)   21 63.5 kg (140 lb)   21 61.2 kg (135 lb)   12/10/20 61.2 kg (135 lb)   12/10/20 64.5 kg (142 lb 3.2 oz)   20 63.5 kg (140 lb)   11/10/20 64.4 kg (142 lb)         Comorbidities:   Past Medical History:   Diagnosis Date    Arthritis     bilateral knees and hands    Asthma     Bipolar 1 disorder     Diabetes mellitus     Hiatal hernia     HLD (hyperlipidemia)     Migraine headache     Morbid obesity 2016    S/P gastric bypass 2016    by     Shoulder injury     left shoulder rotator cuff repair     Past Surgical History:   Procedure Laterality Date     SECTION      COLONOSCOPY N/A 2019    Procedure: COLONOSCOPY;  Surgeon: Dudley Araiza MD;  Location: [facility] ENDO;  Service: Endoscopy;  Laterality: N/A;    ESOPHAGOGASTRODUODENOSCOPY N/A 2019    Procedure: EGD (ESOPHAGOGASTRODUODENOSCOPY);  Surgeon: Dudley Araiza MD;  Location: [facility] ENDO;  Service: Endoscopy;  Laterality: N/A;    GASTRIC BYPASS  2016        HYSTERECTOMY      INTRALUMINAL GASTROINTESTINAL TRACT IMAGING VIA CAPSULE N/A 2019    Procedure: IMAGING PROCEDURE, GI TRACT, INTRALUMINAL, VIA CAPSULE;  Surgeon: Dudley Arazia MD;  Location: [facility] ENDO;  Service: Endoscopy;  Laterality: N/A;    JOINT REPLACEMENT      bilateral knees    ROTATOR CUFF REPAIR Left 2016    TOTAL KNEE ARTHROPLASTY Bilateral      Family History   Problem Relation Age of Onset    Diabetes Mother     Hyperlipidemia Mother     Hypertension Mother     Heart disease Father     Hypertension Brother     Breast cancer Sister      Social History      Tobacco Use    Smoking status: Never Smoker    Smokeless tobacco: Never Used   Substance Use Topics    Alcohol use: No    Drug use: No          Medications:  Current Outpatient Medications on File Prior to Visit   Medication Sig Dispense Refill    albuterol (PROVENTIL) 2.5 mg /3 mL (0.083 %) nebulizer solution Take 2.5 mg by nebulization every 6 (six) hours as needed for Wheezing or Shortness of Breath.       ALBUTEROL SULFATE (VENTOLIN HFA INHL) Inhale 1 puff into the lungs once daily.       amitriptyline (ELAVIL) 150 MG Tab Take 150 mg by mouth nightly.      amoxicillin (AMOXIL) 500 MG capsule Take 500 mg by mouth every 8 (eight) hours.      celecoxib (CELEBREX) 100 MG capsule Take 100 mg by mouth once daily.      cyanocobalamin 1,000 mcg/mL injection Inject 1,000 mcg into the muscle every 30 days.       dexlansoprazole (DEXILANT) 60 mg capsule Take 1 capsule (60 mg total) by mouth once daily. 90 capsule 3    dextroamphetamine-amphetamine (ADDERALL XR) 30 MG 24 hr capsule Take 30 mg by mouth every morning.   0    dextroamphetamine-amphetamine (ADDERALL) 20 mg tablet Take 20 mg by mouth once daily.       epinastine 0.05 % ophthalmic solution Place 1 drop into both eyes 2 (two) times daily.  99    estradiol (ESTRACE) 1 MG tablet Take 1 mg by mouth once daily.  11    fluticasone-salmeterol diskus inhaler 100-50 mcg Inhale 1 puff into the lungs 2 (two) times daily.      gabapentin (NEURONTIN) 300 MG capsule Take 300 mg by mouth 2 (two) times daily.       hydrocodone-acetaminophen 7.5-325mg (NORCO) 7.5-325 mg per tablet Take 1-2 tablets by mouth every 4 (four) hours as needed for Pain. 40 tablet 0    lamotrigine (LAMICTAL) 150 MG Tab Take 300 mg by mouth every evening.       multivitamin (THERAGRAN) per tablet Take 1 tablet by mouth once daily.      olopatadine (PATADAY) 0.2 % Drop Place 1 drop into both eyes once daily.      ondansetron (ZOFRAN-ODT) 4 MG TbDL Take 1 tablet (4 mg total) by  mouth every 6 (six) hours as needed. 30 tablet 3    pramipexole (MIRAPEX) 0.25 MG tablet Take 0.25 mg by mouth 2 (two) times daily.      sertraline (ZOLOFT) 100 MG tablet Take 150 mg by mouth every evening.       simvastatin (ZOCOR) 20 MG tablet Take 1 tablet (20 mg total) by mouth every evening. 90 tablet 3    sulindac (CLINORIL) 200 MG Tab Take 200 mg by mouth 2 (two) times daily. HCS      sumatriptan (IMITREX STATDOSE) 6 mg/0.5 mL kit Inject 6 mg into the skin every 2 (two) hours as needed for Migraine.      sumatriptan (IMITREX) 100 MG tablet Take 100 mg by mouth every 2 (two) hours as needed for Migraine.      tiZANidine (ZANAFLEX) 4 MG tablet Take 4 mg by mouth 2 (two) times a day.       topiramate (TOPAMAX) 25 MG tablet Take 1 tablet (25 mg total) by mouth 2 (two) times daily. 60 tablet 0     No current facility-administered medications on file prior to visit.         Labs: individually reviewed and interpreted.  Most Recent Data:  CBC:   Lab Results   Component Value Date    WBC 3.77 (L) 01/31/2021    HGB 10.0 (L) 01/31/2021    HCT 33.0 (L) 01/31/2021     01/31/2021    MCV 97 01/31/2021    RDW 14.2 01/31/2021     BMP:   Lab Results   Component Value Date     01/31/2021    K 4.0 01/31/2021     01/31/2021    CO2 28 01/31/2021    BUN 9 01/31/2021    CREATININE 0.6 01/31/2021    GLU 95 01/31/2021    CALCIUM 8.3 (L) 01/31/2021    MG 2.3 01/31/2021    PHOS 4.0 08/16/2017     LFTs:   Lab Results   Component Value Date    PROT 6.3 01/31/2021    ALBUMIN 3.2 (L) 01/31/2021    BILITOT 0.3 01/31/2021    AST 13 01/31/2021    ALKPHOS 71 01/31/2021    ALT 12 01/31/2021     Coags:   Lab Results   Component Value Date    INR 1.0 04/09/2014     FLP:   Lab Results   Component Value Date    CHOL 163 10/16/2018    HDL 65 10/16/2018    LDLCALC 80.6 10/16/2018    TRIG 87 10/16/2018    CHOLHDL 39.9 10/16/2018     DM:   Lab Results   Component Value Date    HGBA1C 5.1 10/16/2018    HGBA1C 5.7 08/18/2016     HGBA1C 5.4 04/26/2016    LDLCALC 80.6 10/16/2018    CREATININE 0.6 01/31/2021     Thyroid:   Lab Results   Component Value Date    TSH 0.990 01/31/2021    FREET4 0.93 08/18/2016    F8TEOVO 5.1 08/18/2016    H1GOXRD 111 08/18/2016     Anemia:   Lab Results   Component Value Date    IRON 55 10/16/2018    TIBC 480 (H) 10/16/2018    KGTBUNDB00 389 10/16/2018    FOLATE 10.9 08/18/2016     Cardiac:   Lab Results   Component Value Date    TROPONINI <0.030 12/10/2020     (H) 12/10/2020     Urinalysis:   Lab Results   Component Value Date    COLORU Yellow 01/31/2021    SPECGRAV 1.015 01/31/2021    NITRITE Negative 01/31/2021    KETONESU Negative 01/31/2021    UROBILINOGEN Negative 01/31/2021             Review of Systems:  Review of Systems   Constitutional: Positive for activity change and unexpected weight change. Negative for appetite change, fatigue and fever.   HENT: Negative for congestion, ear pain, postnasal drip, rhinorrhea, sinus pressure, sinus pain, sore throat and trouble swallowing.    Eyes: Negative for pain, itching and visual disturbance.   Respiratory: Negative for apnea, choking, chest tightness, shortness of breath and wheezing.    Cardiovascular: Negative for chest pain, palpitations and leg swelling.   Gastrointestinal: Negative for abdominal distention, abdominal pain, constipation, diarrhea, nausea and vomiting.   Endocrine: Negative for cold intolerance, heat intolerance, polydipsia, polyphagia and polyuria.   Genitourinary: Negative for difficulty urinating, dyspareunia, dysuria and frequency.   Musculoskeletal: Positive for gait problem and joint swelling. Negative for arthralgias, back pain, neck pain and neck stiffness.   Skin: Negative for color change.   Allergic/Immunologic: Negative for environmental allergies and food allergies.   Neurological: Negative for dizziness, weakness, light-headedness, numbness and headaches.   Psychiatric/Behavioral: Negative for agitation, confusion and  sleep disturbance. The patient is not nervous/anxious.        Constipation: none  Reflux: depends on the food, controlled with meds  Vomiting: none      Body comp:  Fat Percent:  42.5 %  Fat Mass:  79.8 lb  FFM:  107.8 lb  TBW: 76.8 lb  TBW %:  40.9 %  BMR: 1500 kcal      Wt Readings from Last 10 Encounters:   06/09/22 85.1 kg (187 lb 9.6 oz)   05/03/22 83.3 kg (183 lb 10.1 oz)   01/01/22 74.8 kg (165 lb)   05/27/21 59 kg (130 lb)   03/26/21 63.5 kg (140 lb)   01/31/21 61.2 kg (135 lb)   12/10/20 61.2 kg (135 lb)   12/10/20 64.5 kg (142 lb 3.2 oz)   11/24/20 63.5 kg (140 lb)   11/10/20 64.4 kg (142 lb)         Diet:  Breakfast: diet green tea  Lunch: none  Dinner: minimal- cornbread night before  Snack: none  Protein shake: none  Water- 1 bottle (16oz) dailys    Exercise:  None since 5/1/22 when sprained ankle, waiting for MRI    MVI: none        Physical Exam:  Vitals:    06/09/22 0905   BP: 139/83   Pulse: 76   Resp: 16   Temp: 98.5 °F (36.9 °C)       Physical Exam  Vitals and nursing note reviewed.   Constitutional:       General: She is awake. She is not in acute distress.     Appearance: Normal appearance. She is well-developed and well-groomed. She is obese. She is not ill-appearing, toxic-appearing or diaphoretic.   HENT:      Head: Normocephalic and atraumatic.      Right Ear: External ear normal.      Left Ear: External ear normal.      Nose: Nose normal. No congestion or rhinorrhea.      Mouth/Throat:      Mouth: Mucous membranes are moist.      Pharynx: Oropharynx is clear.   Eyes:      General:         Right eye: No discharge.         Left eye: No discharge.      Extraocular Movements: Extraocular movements intact.      Conjunctiva/sclera: Conjunctivae normal.   Neck:      Trachea: Trachea normal. No tracheal tenderness.   Cardiovascular:      Rate and Rhythm: Normal rate and regular rhythm.      Pulses: Normal pulses.           Carotid pulses are 2+ on the right side and 2+ on the left side.        Radial pulses are 2+ on the right side and 2+ on the left side.      Heart sounds: Normal heart sounds.   Pulmonary:      Effort: Pulmonary effort is normal. No tachypnea, bradypnea, accessory muscle usage, prolonged expiration, respiratory distress or retractions.      Breath sounds: Normal breath sounds and air entry. No stridor or decreased air movement. No wheezing.   Chest:      Chest wall: No tenderness.   Abdominal:      General: Abdomen is protuberant. Bowel sounds are normal. There is no distension.      Palpations: Abdomen is soft. There is no mass.      Tenderness: There is no abdominal tenderness. There is no guarding.   Musculoskeletal:         General: No swelling or tenderness.      Cervical back: Normal range of motion. No edema, erythema or tenderness. No muscular tenderness. Normal range of motion.      Right lower leg: No edema.      Left lower leg: No edema.   Skin:     General: Skin is warm and dry.      Capillary Refill: Capillary refill takes less than 2 seconds.      Coloration: Skin is not cyanotic, jaundiced or pale.      Findings: No bruising.   Neurological:      General: No focal deficit present.      Mental Status: She is alert and oriented to person, place, and time.      Cranial Nerves: No cranial nerve deficit.      Motor: No weakness.      Gait: Gait normal.   Psychiatric:         Attention and Perception: Attention and perception normal. She does not perceive auditory hallucinations.         Mood and Affect: Mood and affect normal.         Speech: Speech normal.         Behavior: Behavior normal. Behavior is cooperative.         Thought Content: Thought content normal.         Judgment: Judgment normal.         A/P: s/p Gastric Bypass         Counseling/Plan for patient:  EKG reviewed  Fall reviewed with patient  Tanita scale results reviewed with patient  Diet: continue protocol- daughter with Downs- recently had sleeve 2 months ago and is currently taking care of her more  "difficult to get in her food.  Patient's daughter has now increased to her "new normal" with food intake therefore the patient has been more compliant over a few days as she is eating what  she makes for her daughter   Do not skip meals   Tract food   Increase protein to 60 g per day  Exercise: ok for cardio, no heavy lifting over 30 pounds for another month   Increase activity as tolerated  Vitamins: 2 mvi daily, calcium, and b complex  Start back to taking multivitamins- discussion of post-operative life long MVI need, including Calcium, and a B-Complex  Start medication as prescribed- will do 1 month however if not compliant with diet we will not refill      Co morbidities:     1. Morbid obesity  phentermine 37.5 MG capsule   2. Dyslipidemia     3. Vitamin D deficiency     4. Iron deficiency     5. Type 2 diabetes mellitus without complication, without long-term current use of insulin         I spent a total of 35 minutes on the day of the visit.This includes face to face time and non-face to face time preparing to see the patient (eg, review of tests), obtaining and/or reviewing separately obtained history, documenting clinical information in the electronic or other health record, independently interpreting results and communicating results to the patient/family/caregiver, or care coordinator.     "

## 2022-06-10 NOTE — TELEPHONE ENCOUNTER
----- Message from Mikaela Hallman sent at 6/10/2022  3:57 PM CDT -----  Regarding: Call Back  Who Called: pt          What is the reason for the call: pt is calling asking if  Takes Medicaid.. advised no new medicaid at this time. She would like for someone at the office to give her a call in regards to some questions following her surgery. Please contact          Can patient be contacted on [employer]: Yes          Call back number: [phone]

## 2022-06-10 NOTE — TELEPHONE ENCOUNTER
Called pt, provided the names of 2 facilities that accept medicaid. Will follow-up Monday with more info. Pt verbalized understanding.

## 2022-06-13 ENCOUNTER — TELEPHONE (OUTPATIENT)
Dept: BARIATRICS | Facility: CLINIC | Age: 52
End: 2022-06-13
Payer: MEDICARE

## 2022-06-13 NOTE — TELEPHONE ENCOUNTER
Called pt to notify her that the PA was denied from her insurance company regarding her Rx for Phentermine. Pt verbalized that she is aware. Also, as follow-up from her inquiry last week, provided her with institutions with bariatric services and their phone numbers that may accept medicaid.

## 2022-07-11 ENCOUNTER — OFFICE VISIT (OUTPATIENT)
Dept: BARIATRICS | Facility: CLINIC | Age: 52
End: 2022-07-11
Payer: MEDICARE

## 2022-07-11 VITALS
HEART RATE: 81 BPM | WEIGHT: 188.38 LBS | TEMPERATURE: 99 F | RESPIRATION RATE: 16 BRPM | BODY MASS INDEX: 31.39 KG/M2 | HEIGHT: 65 IN | SYSTOLIC BLOOD PRESSURE: 155 MMHG | DIASTOLIC BLOOD PRESSURE: 95 MMHG

## 2022-07-11 DIAGNOSIS — E78.5 DYSLIPIDEMIA: ICD-10-CM

## 2022-07-11 DIAGNOSIS — E11.9 TYPE 2 DIABETES MELLITUS WITHOUT COMPLICATION, WITHOUT LONG-TERM CURRENT USE OF INSULIN: ICD-10-CM

## 2022-07-11 DIAGNOSIS — E66.01 MORBID OBESITY: Primary | ICD-10-CM

## 2022-07-11 PROCEDURE — 1159F PR MEDICATION LIST DOCUMENTED IN MEDICAL RECORD: ICD-10-PCS | Mod: CPTII,S$GLB,, | Performed by: NURSE PRACTITIONER

## 2022-07-11 PROCEDURE — 99999 PR PBB SHADOW E&M-EST. PATIENT-LVL V: CPT | Mod: PBBFAC,,, | Performed by: NURSE PRACTITIONER

## 2022-07-11 PROCEDURE — 1159F MED LIST DOCD IN RCRD: CPT | Mod: CPTII,S$GLB,, | Performed by: NURSE PRACTITIONER

## 2022-07-11 PROCEDURE — 3080F PR MOST RECENT DIASTOLIC BLOOD PRESSURE >= 90 MM HG: ICD-10-PCS | Mod: CPTII,S$GLB,, | Performed by: NURSE PRACTITIONER

## 2022-07-11 PROCEDURE — 1125F PR PAIN SEVERITY QUANTIFIED, PAIN PRESENT: ICD-10-PCS | Mod: CPTII,S$GLB,, | Performed by: NURSE PRACTITIONER

## 2022-07-11 PROCEDURE — 99213 OFFICE O/P EST LOW 20 MIN: CPT | Mod: S$GLB,,, | Performed by: NURSE PRACTITIONER

## 2022-07-11 PROCEDURE — 3080F DIAST BP >= 90 MM HG: CPT | Mod: CPTII,S$GLB,, | Performed by: NURSE PRACTITIONER

## 2022-07-11 PROCEDURE — 3008F PR BODY MASS INDEX (BMI) DOCUMENTED: ICD-10-PCS | Mod: CPTII,S$GLB,, | Performed by: NURSE PRACTITIONER

## 2022-07-11 PROCEDURE — 1125F AMNT PAIN NOTED PAIN PRSNT: CPT | Mod: CPTII,S$GLB,, | Performed by: NURSE PRACTITIONER

## 2022-07-11 PROCEDURE — 3077F SYST BP >= 140 MM HG: CPT | Mod: CPTII,S$GLB,, | Performed by: NURSE PRACTITIONER

## 2022-07-11 PROCEDURE — 3008F BODY MASS INDEX DOCD: CPT | Mod: CPTII,S$GLB,, | Performed by: NURSE PRACTITIONER

## 2022-07-11 PROCEDURE — 3077F PR MOST RECENT SYSTOLIC BLOOD PRESSURE >= 140 MM HG: ICD-10-PCS | Mod: CPTII,S$GLB,, | Performed by: NURSE PRACTITIONER

## 2022-07-11 PROCEDURE — 99999 PR PBB SHADOW E&M-EST. PATIENT-LVL V: ICD-10-PCS | Mod: PBBFAC,,, | Performed by: NURSE PRACTITIONER

## 2022-07-11 PROCEDURE — 1160F PR REVIEW ALL MEDS BY PRESCRIBER/CLIN PHARMACIST DOCUMENTED: ICD-10-PCS | Mod: CPTII,S$GLB,, | Performed by: NURSE PRACTITIONER

## 2022-07-11 PROCEDURE — 1160F RVW MEDS BY RX/DR IN RCRD: CPT | Mod: CPTII,S$GLB,, | Performed by: NURSE PRACTITIONER

## 2022-07-11 PROCEDURE — 99213 PR OFFICE/OUTPT VISIT, EST, LEVL III, 20-29 MIN: ICD-10-PCS | Mod: S$GLB,,, | Performed by: NURSE PRACTITIONER

## 2022-07-11 NOTE — PROGRESS NOTES
Bariatric Surgery Post Op Note    Surgery: gastric bypass surgery  Date: 2016  Initial weight: 248 lbs              Lowest recorded- 130 lbs  Last weight: 187 lbs  Current weight: 188 lbs    Wt Readings from Last 10 Encounters:   22 85.5 kg (188 lb 6.4 oz)   22 85.1 kg (187 lb 9.6 oz)   22 83.3 kg (183 lb 10.1 oz)   22 74.8 kg (165 lb)   21 59 kg (130 lb)   21 63.5 kg (140 lb)   21 61.2 kg (135 lb)   12/10/20 61.2 kg (135 lb)   12/10/20 64.5 kg (142 lb 3.2 oz)   20 63.5 kg (140 lb)         Comorbidities:   Past Medical History:   Diagnosis Date    Arthritis     bilateral knees and hands    Asthma     Bipolar 1 disorder     Diabetes mellitus     Hiatal hernia     HLD (hyperlipidemia)     Migraine headache     Morbid obesity 2016    S/P gastric bypass 2016    by     Shoulder injury     left shoulder rotator cuff repair     Past Surgical History:   Procedure Laterality Date     SECTION      COLONOSCOPY N/A 2019    Procedure: COLONOSCOPY;  Surgeon: Dudley Araiza MD;  Location: [facility] ENDO;  Service: Endoscopy;  Laterality: N/A;    ESOPHAGOGASTRODUODENOSCOPY N/A 2019    Procedure: EGD (ESOPHAGOGASTRODUODENOSCOPY);  Surgeon: Dudley Araiza MD;  Location: [facility] ENDO;  Service: Endoscopy;  Laterality: N/A;    GASTRIC BYPASS  2016        HYSTERECTOMY      INTRALUMINAL GASTROINTESTINAL TRACT IMAGING VIA CAPSULE N/A 2019    Procedure: IMAGING PROCEDURE, GI TRACT, INTRALUMINAL, VIA CAPSULE;  Surgeon: Dudley Araiza MD;  Location: [facility] ENDO;  Service: Endoscopy;  Laterality: N/A;    JOINT REPLACEMENT      bilateral knees    ROTATOR CUFF REPAIR Left 2016    TOTAL KNEE ARTHROPLASTY Bilateral      Family History   Problem Relation Age of Onset    Diabetes Mother     Hyperlipidemia Mother     Hypertension Mother     Heart disease Father     Hypertension Brother     Breast cancer Sister       Social History     Tobacco Use    Smoking status: Never Smoker    Smokeless tobacco: Never Used   Substance Use Topics    Alcohol use: No    Drug use: No          Medications:  Current Outpatient Medications on File Prior to Visit   Medication Sig Dispense Refill    albuterol (PROVENTIL) 2.5 mg /3 mL (0.083 %) nebulizer solution Take 2.5 mg by nebulization every 6 (six) hours as needed for Wheezing or Shortness of Breath.       ALBUTEROL SULFATE (VENTOLIN HFA INHL) Inhale 1 puff into the lungs once daily.       amitriptyline (ELAVIL) 150 MG Tab Take 150 mg by mouth nightly.      celecoxib (CELEBREX) 100 MG capsule Take 100 mg by mouth once daily.      cyanocobalamin 1,000 mcg/mL injection Inject 1,000 mcg into the muscle every 30 days.       dexlansoprazole (DEXILANT) 60 mg capsule Take 1 capsule (60 mg total) by mouth once daily. 90 capsule 3    dextroamphetamine-amphetamine (ADDERALL XR) 30 MG 24 hr capsule Take 30 mg by mouth every morning.   0    dextroamphetamine-amphetamine (ADDERALL) 20 mg tablet Take 20 mg by mouth once daily.       epinastine 0.05 % ophthalmic solution Place 1 drop into both eyes 2 (two) times daily.  99    estradiol (ESTRACE) 1 MG tablet Take 1 mg by mouth once daily.  11    fluticasone-salmeterol diskus inhaler 100-50 mcg Inhale 1 puff into the lungs 2 (two) times daily.      gabapentin (NEURONTIN) 300 MG capsule Take 300 mg by mouth 2 (two) times daily.       hydrocodone-acetaminophen 7.5-325mg (NORCO) 7.5-325 mg per tablet Take 1-2 tablets by mouth every 4 (four) hours as needed for Pain. 40 tablet 0    lamotrigine (LAMICTAL) 150 MG Tab Take 300 mg by mouth every evening.       multivitamin (THERAGRAN) per tablet Take 1 tablet by mouth once daily.      olopatadine (PATADAY) 0.2 % Drop Place 1 drop into both eyes once daily.      ondansetron (ZOFRAN-ODT) 4 MG TbDL Take 1 tablet (4 mg total) by mouth every 6 (six) hours as needed. 30 tablet 3    pramipexole  (MIRAPEX) 0.25 MG tablet Take 0.25 mg by mouth 2 (two) times daily.      sertraline (ZOLOFT) 100 MG tablet Take 150 mg by mouth every evening.       simvastatin (ZOCOR) 20 MG tablet Take 1 tablet (20 mg total) by mouth every evening. 90 tablet 3    sulindac (CLINORIL) 200 MG Tab Take 200 mg by mouth 2 (two) times daily. HCS      sumatriptan (IMITREX STATDOSE) 6 mg/0.5 mL kit Inject 6 mg into the skin every 2 (two) hours as needed for Migraine.      sumatriptan (IMITREX) 100 MG tablet Take 100 mg by mouth every 2 (two) hours as needed for Migraine.      tiZANidine (ZANAFLEX) 4 MG tablet Take 4 mg by mouth 2 (two) times a day.       topiramate (TOPAMAX) 25 MG tablet Take 1 tablet (25 mg total) by mouth 2 (two) times daily. 60 tablet 0    amoxicillin (AMOXIL) 500 MG capsule Take 500 mg by mouth every 8 (eight) hours.       No current facility-administered medications on file prior to visit.         Labs: individually reviewed and interpreted.  Most Recent Data:  CBC:   Lab Results   Component Value Date    WBC 3.77 (L) 01/31/2021    HGB 10.0 (L) 01/31/2021    HCT 33.0 (L) 01/31/2021     01/31/2021    MCV 97 01/31/2021    RDW 14.2 01/31/2021     BMP:   Lab Results   Component Value Date     01/31/2021    K 4.0 01/31/2021     01/31/2021    CO2 28 01/31/2021    BUN 9 01/31/2021    CREATININE 0.6 01/31/2021    GLU 95 01/31/2021    CALCIUM 8.3 (L) 01/31/2021    MG 2.3 01/31/2021    PHOS 4.0 08/16/2017     LFTs:   Lab Results   Component Value Date    PROT 6.3 01/31/2021    ALBUMIN 3.2 (L) 01/31/2021    BILITOT 0.3 01/31/2021    AST 13 01/31/2021    ALKPHOS 71 01/31/2021    ALT 12 01/31/2021     Coags:   Lab Results   Component Value Date    INR 1.0 04/09/2014     FLP:   Lab Results   Component Value Date    CHOL 163 10/16/2018    HDL 65 10/16/2018    LDLCALC 80.6 10/16/2018    TRIG 87 10/16/2018    CHOLHDL 39.9 10/16/2018     DM:   Lab Results   Component Value Date    HGBA1C 5.1 10/16/2018     HGBA1C 5.7 08/18/2016    HGBA1C 5.4 04/26/2016    LDLCALC 80.6 10/16/2018    CREATININE 0.6 01/31/2021     Thyroid:   Lab Results   Component Value Date    TSH 0.990 01/31/2021    FREET4 0.93 08/18/2016    J9VFGLV 5.1 08/18/2016    P1LMHUG 111 08/18/2016     Anemia:   Lab Results   Component Value Date    IRON 55 10/16/2018    TIBC 480 (H) 10/16/2018    YNBISWSS75 389 10/16/2018    FOLATE 10.9 08/18/2016     Cardiac:   Lab Results   Component Value Date    TROPONINI <0.030 12/10/2020     (H) 12/10/2020     Urinalysis:   Lab Results   Component Value Date    COLORU Yellow 01/31/2021    SPECGRAV 1.015 01/31/2021    NITRITE Negative 01/31/2021    KETONESU Negative 01/31/2021    UROBILINOGEN Negative 01/31/2021             Review of Systems:  Review of Systems   Constitutional: Positive for activity change and unexpected weight change. Negative for appetite change, fatigue and fever.   HENT: Negative for congestion, ear pain, postnasal drip, rhinorrhea, sinus pressure, sinus pain, sore throat and trouble swallowing.    Eyes: Negative for pain, itching and visual disturbance.   Respiratory: Negative for apnea, choking, chest tightness, shortness of breath and wheezing.    Cardiovascular: Negative for chest pain, palpitations and leg swelling.   Gastrointestinal: Negative for abdominal distention, abdominal pain, constipation, diarrhea, nausea and vomiting.   Endocrine: Negative for cold intolerance, heat intolerance, polydipsia, polyphagia and polyuria.   Genitourinary: Negative for difficulty urinating, dyspareunia, dysuria and frequency.   Musculoskeletal: Positive for gait problem and joint swelling. Negative for arthralgias, back pain, neck pain and neck stiffness.   Skin: Negative for color change.   Allergic/Immunologic: Negative for environmental allergies and food allergies.   Neurological: Negative for dizziness, weakness, light-headedness, numbness and headaches.   Psychiatric/Behavioral: Negative for  agitation, confusion and sleep disturbance. The patient is not nervous/anxious.        Constipation: none  Reflux: none  Vomiting: none      Body comp:  Fat Percent:  42.2 %  Fat Mass:  79.6 lb  FFM:  108.8 lb  TBW: 77.6 lb  TBW %:  41.2 %  BMR: 1511 kcal      Wt Readings from Last 10 Encounters:   07/11/22 85.5 kg (188 lb 6.4 oz)   06/09/22 85.1 kg (187 lb 9.6 oz)   05/03/22 83.3 kg (183 lb 10.1 oz)   01/01/22 74.8 kg (165 lb)   05/27/21 59 kg (130 lb)   03/26/21 63.5 kg (140 lb)   01/31/21 61.2 kg (135 lb)   12/10/20 61.2 kg (135 lb)   12/10/20 64.5 kg (142 lb 3.2 oz)   11/24/20 63.5 kg (140 lb)         Diet:  Breakfast: skipping mostly-   Lunch: occassionally  Dinner: red beans without rice, bit of cornbread-   Snack: minimal  Protein shake: none  Water: 4 (16oz ) bottle  Coke Zero- 1  Diet green tea    Exercise:  Housework, walking at [employer]  Increased walking and playing basketball    MVI: calcium, womens 1 day        Physical Exam:  Vitals:    07/11/22 1038   BP: (!) 155/95   Pulse: 81   Resp: 16   Temp: 98.5 °F (36.9 °C)       Physical Exam  Vitals and nursing note reviewed.   Constitutional:       General: She is awake. She is not in acute distress.     Appearance: Normal appearance. She is well-developed and well-groomed. She is obese. She is not ill-appearing, toxic-appearing or diaphoretic.   HENT:      Head: Normocephalic and atraumatic.      Right Ear: External ear normal.      Left Ear: External ear normal.      Nose: Nose normal. No congestion or rhinorrhea.      Mouth/Throat:      Mouth: Mucous membranes are moist.      Pharynx: Oropharynx is clear.   Eyes:      General:         Right eye: No discharge.         Left eye: No discharge.      Extraocular Movements: Extraocular movements intact.      Conjunctiva/sclera: Conjunctivae normal.   Neck:      Trachea: Trachea normal. No tracheal tenderness.   Cardiovascular:      Rate and Rhythm: Normal rate and regular rhythm.      Pulses: Normal pulses.            Carotid pulses are 2+ on the right side and 2+ on the left side.       Radial pulses are 2+ on the right side and 2+ on the left side.      Heart sounds: Normal heart sounds.   Pulmonary:      Effort: Pulmonary effort is normal. No tachypnea, bradypnea, accessory muscle usage, prolonged expiration, respiratory distress or retractions.      Breath sounds: Normal breath sounds and air entry. No stridor or decreased air movement. No wheezing.   Chest:      Chest wall: No tenderness.   Abdominal:      General: Abdomen is protuberant. Bowel sounds are normal. There is no distension.      Palpations: Abdomen is soft. There is no mass.      Tenderness: There is no abdominal tenderness. There is no guarding.   Musculoskeletal:         General: No swelling or tenderness (rt ankle).      Cervical back: Normal range of motion. No edema, erythema or tenderness. No muscular tenderness. Normal range of motion.      Right lower leg: No edema.      Left lower leg: No edema.   Skin:     General: Skin is warm and dry.      Capillary Refill: Capillary refill takes less than 2 seconds.      Coloration: Skin is not cyanotic, jaundiced or pale.      Findings: No bruising.   Neurological:      General: No focal deficit present.      Mental Status: She is alert and oriented to person, place, and time.      Cranial Nerves: No cranial nerve deficit.      Motor: No weakness.      Gait: Gait normal.   Psychiatric:         Attention and Perception: Attention and perception normal. She does not perceive auditory hallucinations.         Mood and Affect: Mood and affect normal.         Speech: Speech normal.         Behavior: Behavior normal. Behavior is cooperative.         Thought Content: Thought content normal.         Judgment: Judgment normal.             A/P: s/p Bypass 5/16/16         Counseling/Plan for patient:  Fall reviewed with patient  Tanita scale results reviewed with patient  Diet: continue protocol   Do not skip  meals   Eat more often- eat when feeding daughter meals (daughter recent sleeve)  Collaborated with Bariatric dietitian for lactose intolerance and protein shakes  Exercise: ok for cardio, heavy lifting over 30 pounds   Continue to increase activity on consistent basis  Vitamins: 2 mvi daily, calcium, and b complex  Continue multivitamins- discussion of post-operative life long MVI need, including Calcium, and a B-Complex  Refill Rx        Co morbidities:     1. Morbid obesity     2. Dyslipidemia     3. Type 2 diabetes mellitus without complication, without long-term current use of insulin         I spent a total of 20 minutes on the day of the visit.This includes face to face time and non-face to face time preparing to see the patient (eg, review of tests), obtaining and/or reviewing separately obtained history, documenting clinical information in the electronic or other health record, independently interpreting results and communicating results to the patient/family/caregiver, or care coordinator.

## 2022-08-08 ENCOUNTER — OFFICE VISIT (OUTPATIENT)
Dept: BARIATRICS | Facility: CLINIC | Age: 52
End: 2022-08-08
Payer: MEDICARE

## 2022-08-08 VITALS
HEIGHT: 65 IN | BODY MASS INDEX: 31.46 KG/M2 | TEMPERATURE: 98 F | RESPIRATION RATE: 16 BRPM | HEART RATE: 71 BPM | WEIGHT: 188.81 LBS | DIASTOLIC BLOOD PRESSURE: 90 MMHG | SYSTOLIC BLOOD PRESSURE: 145 MMHG

## 2022-08-08 DIAGNOSIS — Z98.84 HX OF BARIATRIC SURGERY: ICD-10-CM

## 2022-08-08 DIAGNOSIS — E66.09 CLASS 1 OBESITY DUE TO EXCESS CALORIES WITH SERIOUS COMORBIDITY AND BODY MASS INDEX (BMI) OF 31.0 TO 31.9 IN ADULT: ICD-10-CM

## 2022-08-08 DIAGNOSIS — E11.9 TYPE 2 DIABETES MELLITUS WITHOUT COMPLICATION, WITHOUT LONG-TERM CURRENT USE OF INSULIN: ICD-10-CM

## 2022-08-08 DIAGNOSIS — E66.01 MORBID OBESITY: Primary | ICD-10-CM

## 2022-08-08 DIAGNOSIS — R07.81 RIB PAIN: ICD-10-CM

## 2022-08-08 PROCEDURE — 3008F PR BODY MASS INDEX (BMI) DOCUMENTED: ICD-10-PCS | Mod: CPTII,S$GLB,, | Performed by: NURSE PRACTITIONER

## 2022-08-08 PROCEDURE — 1125F AMNT PAIN NOTED PAIN PRSNT: CPT | Mod: CPTII,S$GLB,, | Performed by: NURSE PRACTITIONER

## 2022-08-08 PROCEDURE — 3077F SYST BP >= 140 MM HG: CPT | Mod: CPTII,S$GLB,, | Performed by: NURSE PRACTITIONER

## 2022-08-08 PROCEDURE — 99999 PR PBB SHADOW E&M-EST. PATIENT-LVL V: CPT | Mod: PBBFAC,,, | Performed by: NURSE PRACTITIONER

## 2022-08-08 PROCEDURE — 1159F MED LIST DOCD IN RCRD: CPT | Mod: CPTII,S$GLB,, | Performed by: NURSE PRACTITIONER

## 2022-08-08 PROCEDURE — 99213 PR OFFICE/OUTPT VISIT, EST, LEVL III, 20-29 MIN: ICD-10-PCS | Mod: S$GLB,,, | Performed by: NURSE PRACTITIONER

## 2022-08-08 PROCEDURE — 99999 PR PBB SHADOW E&M-EST. PATIENT-LVL V: ICD-10-PCS | Mod: PBBFAC,,, | Performed by: NURSE PRACTITIONER

## 2022-08-08 PROCEDURE — 1159F PR MEDICATION LIST DOCUMENTED IN MEDICAL RECORD: ICD-10-PCS | Mod: CPTII,S$GLB,, | Performed by: NURSE PRACTITIONER

## 2022-08-08 PROCEDURE — 3080F PR MOST RECENT DIASTOLIC BLOOD PRESSURE >= 90 MM HG: ICD-10-PCS | Mod: CPTII,S$GLB,, | Performed by: NURSE PRACTITIONER

## 2022-08-08 PROCEDURE — 3008F BODY MASS INDEX DOCD: CPT | Mod: CPTII,S$GLB,, | Performed by: NURSE PRACTITIONER

## 2022-08-08 PROCEDURE — 1125F PR PAIN SEVERITY QUANTIFIED, PAIN PRESENT: ICD-10-PCS | Mod: CPTII,S$GLB,, | Performed by: NURSE PRACTITIONER

## 2022-08-08 PROCEDURE — 3080F DIAST BP >= 90 MM HG: CPT | Mod: CPTII,S$GLB,, | Performed by: NURSE PRACTITIONER

## 2022-08-08 PROCEDURE — 3077F PR MOST RECENT SYSTOLIC BLOOD PRESSURE >= 140 MM HG: ICD-10-PCS | Mod: CPTII,S$GLB,, | Performed by: NURSE PRACTITIONER

## 2022-08-08 PROCEDURE — 1160F RVW MEDS BY RX/DR IN RCRD: CPT | Mod: CPTII,S$GLB,, | Performed by: NURSE PRACTITIONER

## 2022-08-08 PROCEDURE — 99213 OFFICE O/P EST LOW 20 MIN: CPT | Mod: S$GLB,,, | Performed by: NURSE PRACTITIONER

## 2022-08-08 PROCEDURE — 1160F PR REVIEW ALL MEDS BY PRESCRIBER/CLIN PHARMACIST DOCUMENTED: ICD-10-PCS | Mod: CPTII,S$GLB,, | Performed by: NURSE PRACTITIONER

## 2022-08-08 RX ORDER — PHENTERMINE HYDROCHLORIDE 37.5 MG/1
37.5 TABLET ORAL DAILY
Qty: 30 TABLET | Refills: 0 | Status: SHIPPED | OUTPATIENT
Start: 2022-08-08 | End: 2022-08-10

## 2022-08-08 NOTE — PROGRESS NOTES
Bariatric Surgery Post Op Note    Surgery: gastric bypass surgery  Date: 2016  Initial weight: 248 lbs              Lowest recorded-130 lbs  Last weight: 187 lbs  Current weight: 188 lbs      Wt Readings from Last 10 Encounters:   22 85.6 kg (188 lb 12.8 oz)   22 85.5 kg (188 lb 6.4 oz)   22 85.1 kg (187 lb 9.6 oz)   22 83.3 kg (183 lb 10.1 oz)   22 74.8 kg (165 lb)   21 59 kg (130 lb)   21 63.5 kg (140 lb)   21 61.2 kg (135 lb)   12/10/20 61.2 kg (135 lb)   12/10/20 64.5 kg (142 lb 3.2 oz)         Comorbidities:   Past Medical History:   Diagnosis Date    Arthritis     bilateral knees and hands    Asthma     Bipolar 1 disorder     Diabetes mellitus     Hiatal hernia     HLD (hyperlipidemia)     Migraine headache     Morbid obesity 2016    S/P gastric bypass 2016    by     Shoulder injury     left shoulder rotator cuff repair     Past Surgical History:   Procedure Laterality Date     SECTION      COLONOSCOPY N/A 2019    Procedure: COLONOSCOPY;  Surgeon: Dudley Araiza MD;  Location: [facility];  Service: Endoscopy;  Laterality: N/A;    ESOPHAGOGASTRODUODENOSCOPY N/A 2019    Procedure: EGD (ESOPHAGOGASTRODUODENOSCOPY);  Surgeon: Dudley Araiza MD;  Location: [facility] ENDO;  Service: Endoscopy;  Laterality: N/A;    GASTRIC BYPASS  2016        HYSTERECTOMY      INTRALUMINAL GASTROINTESTINAL TRACT IMAGING VIA CAPSULE N/A 2019    Procedure: IMAGING PROCEDURE, GI TRACT, INTRALUMINAL, VIA CAPSULE;  Surgeon: Dudley Araiza MD;  Location: [facility] ENDO;  Service: Endoscopy;  Laterality: N/A;    JOINT REPLACEMENT      bilateral knees    ROTATOR CUFF REPAIR Left 2016    TOTAL KNEE ARTHROPLASTY Bilateral      Family History   Problem Relation Age of Onset    Diabetes Mother     Hyperlipidemia Mother     Hypertension Mother     Heart disease Father     Hypertension Brother     Breast cancer  Sister      Social History     Tobacco Use    Smoking status: Never Smoker    Smokeless tobacco: Never Used   Substance Use Topics    Alcohol use: No    Drug use: No          Medications:  Current Outpatient Medications on File Prior to Visit   Medication Sig Dispense Refill    albuterol (PROVENTIL) 2.5 mg /3 mL (0.083 %) nebulizer solution Take 2.5 mg by nebulization every 6 (six) hours as needed for Wheezing or Shortness of Breath.       ALBUTEROL SULFATE (VENTOLIN HFA INHL) Inhale 1 puff into the lungs once daily.       amitriptyline (ELAVIL) 150 MG Tab Take 150 mg by mouth nightly.      amoxicillin (AMOXIL) 500 MG capsule Take 500 mg by mouth every 8 (eight) hours.      celecoxib (CELEBREX) 100 MG capsule Take 100 mg by mouth once daily.      cyanocobalamin 1,000 mcg/mL injection Inject 1,000 mcg into the muscle every 30 days.       dexlansoprazole (DEXILANT) 60 mg capsule Take 1 capsule (60 mg total) by mouth once daily. 90 capsule 3    dextroamphetamine-amphetamine (ADDERALL XR) 30 MG 24 hr capsule Take 30 mg by mouth every morning.   0    dextroamphetamine-amphetamine (ADDERALL) 20 mg tablet Take 20 mg by mouth once daily.       epinastine 0.05 % ophthalmic solution Place 1 drop into both eyes 2 (two) times daily.  99    estradiol (ESTRACE) 1 MG tablet Take 1 mg by mouth once daily.  11    fluticasone-salmeterol diskus inhaler 100-50 mcg Inhale 1 puff into the lungs 2 (two) times daily.      gabapentin (NEURONTIN) 300 MG capsule Take 300 mg by mouth 2 (two) times daily.       hydrocodone-acetaminophen 7.5-325mg (NORCO) 7.5-325 mg per tablet Take 1-2 tablets by mouth every 4 (four) hours as needed for Pain. 40 tablet 0    lamotrigine (LAMICTAL) 150 MG Tab Take 300 mg by mouth every evening.       multivitamin (THERAGRAN) per tablet Take 1 tablet by mouth once daily.      olopatadine (PATADAY) 0.2 % Drop Place 1 drop into both eyes once daily.      ondansetron (ZOFRAN-ODT) 4 MG TbDL Take 1  tablet (4 mg total) by mouth every 6 (six) hours as needed. 30 tablet 3    pramipexole (MIRAPEX) 0.25 MG tablet Take 0.25 mg by mouth 2 (two) times daily.      sertraline (ZOLOFT) 100 MG tablet Take 150 mg by mouth every evening.       simvastatin (ZOCOR) 20 MG tablet Take 1 tablet (20 mg total) by mouth every evening. 90 tablet 3    sulindac (CLINORIL) 200 MG Tab Take 200 mg by mouth 2 (two) times daily. HCS      sumatriptan (IMITREX STATDOSE) 6 mg/0.5 mL kit Inject 6 mg into the skin every 2 (two) hours as needed for Migraine.      sumatriptan (IMITREX) 100 MG tablet Take 100 mg by mouth every 2 (two) hours as needed for Migraine.      tiZANidine (ZANAFLEX) 4 MG tablet Take 4 mg by mouth 2 (two) times a day.       topiramate (TOPAMAX) 25 MG tablet Take 1 tablet (25 mg total) by mouth 2 (two) times daily. 60 tablet 0    [DISCONTINUED] phentermine 37.5 MG capsule TAKE ONE CAPSULE BY MOUTH EVERY MORNING 30 capsule 0     No current facility-administered medications on file prior to visit.         Labs: individually reviewed and interpreted.  Most Recent Data:  CBC:   Lab Results   Component Value Date    WBC 3.77 (L) 01/31/2021    HGB 10.0 (L) 01/31/2021    HCT 33.0 (L) 01/31/2021     01/31/2021    MCV 97 01/31/2021    RDW 14.2 01/31/2021     BMP:   Lab Results   Component Value Date     01/31/2021    K 4.0 01/31/2021     01/31/2021    CO2 28 01/31/2021    BUN 9 01/31/2021    CREATININE 0.6 01/31/2021    GLU 95 01/31/2021    CALCIUM 8.3 (L) 01/31/2021    MG 2.3 01/31/2021    PHOS 4.0 08/16/2017     LFTs:   Lab Results   Component Value Date    PROT 6.3 01/31/2021    ALBUMIN 3.2 (L) 01/31/2021    BILITOT 0.3 01/31/2021    AST 13 01/31/2021    ALKPHOS 71 01/31/2021    ALT 12 01/31/2021     Coags:   Lab Results   Component Value Date    INR 1.0 04/09/2014     FLP:   Lab Results   Component Value Date    CHOL 163 10/16/2018    HDL 65 10/16/2018    LDLCALC 80.6 10/16/2018    TRIG 87 10/16/2018     CHOLHDL 39.9 10/16/2018     DM:   Lab Results   Component Value Date    HGBA1C 5.1 10/16/2018    HGBA1C 5.7 08/18/2016    HGBA1C 5.4 04/26/2016    LDLCALC 80.6 10/16/2018    CREATININE 0.6 01/31/2021     Thyroid:   Lab Results   Component Value Date    TSH 0.990 01/31/2021    FREET4 0.93 08/18/2016    H8YUDMH 5.1 08/18/2016    Q2PCOZM 111 08/18/2016     Anemia:   Lab Results   Component Value Date    IRON 55 10/16/2018    TIBC 480 (H) 10/16/2018    HUKPYTTA44 389 10/16/2018    FOLATE 10.9 08/18/2016     Cardiac:   Lab Results   Component Value Date    TROPONINI <0.030 12/10/2020     (H) 12/10/2020     Urinalysis:   Lab Results   Component Value Date    COLORU Yellow 01/31/2021    SPECGRAV 1.015 01/31/2021    NITRITE Negative 01/31/2021    KETONESU Negative 01/31/2021    UROBILINOGEN Negative 01/31/2021             Review of Systems:  Review of Systems   Constitutional: Positive for activity change and unexpected weight change. Negative for appetite change, fatigue and fever.   HENT: Negative for congestion, ear pain, postnasal drip, rhinorrhea, sinus pressure, sinus pain, sore throat and trouble swallowing.    Eyes: Negative for pain, itching and visual disturbance.   Respiratory: Negative for apnea, choking, chest tightness, shortness of breath and wheezing.    Cardiovascular: Negative for chest pain, palpitations and leg swelling.        Chest wall pain from trauma with helping  lift items from truck   Gastrointestinal: Negative for abdominal distention, abdominal pain, constipation, diarrhea, nausea and vomiting.   Endocrine: Negative for cold intolerance, heat intolerance, polydipsia, polyphagia and polyuria.   Genitourinary: Negative for difficulty urinating, dyspareunia, dysuria and frequency.   Musculoskeletal: Positive for gait problem and joint swelling. Negative for arthralgias, back pain, neck pain and neck stiffness.   Skin: Negative for color change.   Allergic/Immunologic: Negative for  environmental allergies and food allergies.   Neurological: Negative for dizziness, weakness, light-headedness, numbness and headaches.   Psychiatric/Behavioral: Negative for agitation, confusion and sleep disturbance. The patient is not nervous/anxious.        Constipation: none  Reflux: none  Vomiting: none      Body comp:  Fat Percent:  43.9 %  Fat Mass:  82.8 lb  FFM:  106 lb  TBW: 75.4 lb  TBW %:  39.9 %  BMR: 1483 kcal      Wt Readings from Last 10 Encounters:   08/08/22 85.6 kg (188 lb 12.8 oz)   07/11/22 85.5 kg (188 lb 6.4 oz)   06/09/22 85.1 kg (187 lb 9.6 oz)   05/03/22 83.3 kg (183 lb 10.1 oz)   01/01/22 74.8 kg (165 lb)   05/27/21 59 kg (130 lb)   03/26/21 63.5 kg (140 lb)   01/31/21 61.2 kg (135 lb)   12/10/20 61.2 kg (135 lb)   12/10/20 64.5 kg (142 lb 3.2 oz)         Diet:  Breakfast: boil egg, small OJ  Lunch: salad, broccoli cheese soup  Dinner: stir saunders  Snack: PB on Spoon   Protein shake: none with lactose intolerance    Exercise:  None since fx ribs    MVI: one per day, calcium 2x day,        Physical Exam:  Vitals:    08/08/22 1030   BP: (!) 145/90   Pulse: 71   Resp: 16   Temp: 98.2 °F (36.8 °C)       Physical Exam  Vitals and nursing note reviewed.   Constitutional:       General: She is awake. She is not in acute distress.     Appearance: Normal appearance. She is well-developed and well-groomed. She is obese. She is not ill-appearing, toxic-appearing or diaphoretic.   HENT:      Head: Normocephalic and atraumatic.      Right Ear: External ear normal.      Left Ear: External ear normal.      Nose: Nose normal. No congestion or rhinorrhea.      Mouth/Throat:      Mouth: Mucous membranes are moist.      Pharynx: Oropharynx is clear.   Eyes:      General:         Right eye: No discharge.         Left eye: No discharge.      Extraocular Movements: Extraocular movements intact.      Conjunctiva/sclera: Conjunctivae normal.   Neck:      Trachea: Trachea normal. No tracheal tenderness.    Cardiovascular:      Rate and Rhythm: Normal rate and regular rhythm.      Pulses: Normal pulses.           Carotid pulses are 2+ on the right side and 2+ on the left side.       Radial pulses are 2+ on the right side and 2+ on the left side.      Heart sounds: Normal heart sounds.   Pulmonary:      Effort: Pulmonary effort is normal. No tachypnea, bradypnea, accessory muscle usage, prolonged expiration, respiratory distress or retractions.      Breath sounds: Normal breath sounds and air entry. No stridor or decreased air movement. No wheezing.   Chest:      Chest wall: Tenderness present.   Abdominal:      General: Abdomen is protuberant. Bowel sounds are normal. There is no distension.      Palpations: Abdomen is soft. There is no mass.      Tenderness: There is no abdominal tenderness. There is no guarding.   Musculoskeletal:         General: No swelling or tenderness (rt ankle).      Cervical back: Normal range of motion. No edema, erythema or tenderness. No muscular tenderness. Normal range of motion.      Right lower leg: No edema.      Left lower leg: No edema.   Skin:     General: Skin is warm and dry.      Capillary Refill: Capillary refill takes less than 2 seconds.      Coloration: Skin is not cyanotic, jaundiced or pale.      Findings: No bruising.   Neurological:      General: No focal deficit present.      Mental Status: She is alert and oriented to person, place, and time.      Cranial Nerves: No cranial nerve deficit.      Motor: No weakness.      Gait: Gait normal.   Psychiatric:         Attention and Perception: Attention and perception normal. She does not perceive auditory hallucinations.         Mood and Affect: Mood and affect normal.         Speech: Speech normal.         Behavior: Behavior normal. Behavior is cooperative.         Thought Content: Thought content normal.         Judgment: Judgment normal.               A/P: s/p Bypass         Counseling/Plan for patient:  Falls reviewed  with patient  Tanita scale results reviewed with patient  Diet: continue protocol  Exercise: ok for cardio, no heavy lifting over 30 pounds for another month  Vitamins: 2 mvi daily, calcium, and b complex  Continue multivitamins- discussion of post-operative life long MVI need, including Calcium, and a B-Complex   OTC vitamins should be 2x day  Rib pain- recent trauma with moving stuff with , reports fx  Rx refill for Topamax/ Phentermine   Discussed medication on Phentermine to BID      Co morbidities:     1. Morbid obesity  phentermine (ADIPEX-P) 37.5 mg tablet   2. Hx of bariatric surgery     3. Class 1 obesity due to excess calories with serious comorbidity and body mass index (BMI) of 31.0 to 31.9 in adult     4. Type 2 diabetes mellitus without complication, without long-term current use of insulin     5. Rib pain         I spent a total of 22 minutes on the day of the visit.This includes face to face time and non-face to face time preparing to see the patient (eg, review of tests), obtaining and/or reviewing separately obtained history, documenting clinical information in the electronic or other health record, independently interpreting results and communicating results to the patient/family/caregiver, or care coordinator.

## 2022-08-10 ENCOUNTER — PATIENT MESSAGE (OUTPATIENT)
Dept: BARIATRICS | Facility: CLINIC | Age: 52
End: 2022-08-10
Payer: MEDICARE

## 2022-08-10 ENCOUNTER — TELEPHONE (OUTPATIENT)
Dept: BARIATRICS | Facility: CLINIC | Age: 52
End: 2022-08-10
Payer: MEDICARE

## 2022-08-10 DIAGNOSIS — E66.01 MORBID OBESITY: Primary | ICD-10-CM

## 2022-08-10 NOTE — TELEPHONE ENCOUNTER
Yana Jackson called for order clarification. Returned their call, they report that 2 different administration instructions were attached to the order for Phentermine. Sent message to GARCÍA Moore for clarification.

## 2022-09-07 DIAGNOSIS — E66.01 MORBID OBESITY: ICD-10-CM

## 2022-09-08 DIAGNOSIS — E66.01 MORBID OBESITY: Primary | ICD-10-CM

## 2022-09-08 RX ORDER — PHENTERMINE HYDROCHLORIDE 37.5 MG/1
TABLET ORAL
Qty: 30 TABLET | Refills: 0 | OUTPATIENT
Start: 2022-09-08

## 2022-09-08 RX ORDER — PHENTERMINE HYDROCHLORIDE 37.5 MG/1
37.5 TABLET ORAL
Qty: 30 TABLET | Refills: 0 | Status: SHIPPED | OUTPATIENT
Start: 2022-09-08 | End: 2022-09-12

## 2022-09-12 ENCOUNTER — PATIENT MESSAGE (OUTPATIENT)
Dept: BARIATRICS | Facility: CLINIC | Age: 52
End: 2022-09-12
Payer: MEDICARE

## 2022-09-12 RX ORDER — PHENTERMINE HYDROCHLORIDE 15 MG/1
15 CAPSULE ORAL 2 TIMES DAILY
Qty: 60 CAPSULE | Refills: 0 | Status: SHIPPED | OUTPATIENT
Start: 2022-09-12 | End: 2022-10-12

## 2022-09-13 ENCOUNTER — PATIENT MESSAGE (OUTPATIENT)
Dept: BARIATRICS | Facility: CLINIC | Age: 52
End: 2022-09-13
Payer: MEDICARE

## 2022-09-27 RX ORDER — TOPIRAMATE 25 MG/1
25 TABLET ORAL 2 TIMES DAILY
Qty: 60 TABLET | Refills: 0 | OUTPATIENT
Start: 2022-09-27 | End: 2022-10-27

## 2023-04-09 ENCOUNTER — HOSPITAL ENCOUNTER (EMERGENCY)
Facility: HOSPITAL | Age: 53
Discharge: HOME | End: 2023-04-10
Attending: EMERGENCY MEDICINE
Payer: MEDICARE

## 2023-04-09 DIAGNOSIS — R52 PAIN: ICD-10-CM

## 2023-04-09 DIAGNOSIS — M79.604 RIGHT LEG PAIN: Primary | ICD-10-CM

## 2023-04-09 LAB
ALBUMIN SERPL BCP-MCNC: 3.3 G/DL (ref 3.5–5.2)
ALP SERPL-CCNC: 133 U/L (ref 55–135)
ALT SERPL W/O P-5'-P-CCNC: 12 U/L (ref 10–44)
ANION GAP SERPL CALC-SCNC: 7 MMOL/L (ref 8–16)
AST SERPL-CCNC: 14 U/L (ref 10–40)
BASOPHILS # BLD AUTO: 0.02 K/UL (ref 0–0.2)
BASOPHILS NFR BLD: 0.3 % (ref 0–1.9)
BILIRUB SERPL-MCNC: 0.4 MG/DL (ref 0.1–1)
BUN SERPL-MCNC: 17 MG/DL (ref 6–20)
CALCIUM SERPL-MCNC: 8.4 MG/DL (ref 8.7–10.5)
CHLORIDE SERPL-SCNC: 106 MMOL/L (ref 95–110)
CK SERPL-CCNC: 47 U/L (ref 20–180)
CO2 SERPL-SCNC: 25 MMOL/L (ref 23–29)
CREAT SERPL-MCNC: 0.7 MG/DL (ref 0.5–1.4)
DIFFERENTIAL METHOD: ABNORMAL
EOSINOPHIL # BLD AUTO: 0.1 K/UL (ref 0–0.5)
EOSINOPHIL NFR BLD: 1.9 % (ref 0–8)
ERYTHROCYTE [DISTWIDTH] IN BLOOD BY AUTOMATED COUNT: 14.8 % (ref 11.5–14.5)
EST. GFR  (NO RACE VARIABLE): >60 ML/MIN/1.73 M^2
GLUCOSE SERPL-MCNC: 88 MG/DL (ref 70–110)
HCT VFR BLD AUTO: 36.3 % (ref 37–48.5)
HGB BLD-MCNC: 11.5 G/DL (ref 12–16)
IMM GRANULOCYTES # BLD AUTO: 0.01 K/UL (ref 0–0.04)
IMM GRANULOCYTES NFR BLD AUTO: 0.2 % (ref 0–0.5)
INR PPP: 1 (ref 0.8–1.2)
LYMPHOCYTES # BLD AUTO: 2.3 K/UL (ref 1–4.8)
LYMPHOCYTES NFR BLD: 36.5 % (ref 18–48)
MCH RBC QN AUTO: 29 PG (ref 27–31)
MCHC RBC AUTO-ENTMCNC: 31.7 G/DL (ref 32–36)
MCV RBC AUTO: 92 FL (ref 82–98)
MONOCYTES # BLD AUTO: 0.5 K/UL (ref 0.3–1)
MONOCYTES NFR BLD: 8.2 % (ref 4–15)
NEUTROPHILS # BLD AUTO: 3.4 K/UL (ref 1.8–7.7)
NEUTROPHILS NFR BLD: 52.9 % (ref 38–73)
NRBC BLD-RTO: 0 /100 WBC
PLATELET # BLD AUTO: 287 K/UL (ref 150–450)
PMV BLD AUTO: 10.3 FL (ref 9.2–12.9)
POTASSIUM SERPL-SCNC: 4.4 MMOL/L (ref 3.5–5.1)
PROT SERPL-MCNC: 7.1 G/DL (ref 6–8.4)
PROTHROMBIN TIME: 10.6 SEC (ref 9–12.5)
RBC # BLD AUTO: 3.96 M/UL (ref 4–5.4)
SODIUM SERPL-SCNC: 138 MMOL/L (ref 136–145)
WBC # BLD AUTO: 6.35 K/UL (ref 3.9–12.7)

## 2023-04-09 PROCEDURE — 80053 COMPREHEN METABOLIC PANEL: CPT | Performed by: EMERGENCY MEDICINE

## 2023-04-09 PROCEDURE — 82550 ASSAY OF CK (CPK): CPT | Performed by: EMERGENCY MEDICINE

## 2023-04-09 PROCEDURE — 99285 EMERGENCY DEPT VISIT HI MDM: CPT | Mod: 25

## 2023-04-09 PROCEDURE — 63600175 PHARM REV CODE 636 W HCPCS: Performed by: EMERGENCY MEDICINE

## 2023-04-09 PROCEDURE — 85610 PROTHROMBIN TIME: CPT | Performed by: EMERGENCY MEDICINE

## 2023-04-09 PROCEDURE — 96374 THER/PROPH/DIAG INJ IV PUSH: CPT | Mod: 59

## 2023-04-09 PROCEDURE — 85025 COMPLETE CBC W/AUTO DIFF WBC: CPT | Performed by: EMERGENCY MEDICINE

## 2023-04-09 RX ORDER — HYDROMORPHONE HYDROCHLORIDE 1 MG/ML
0.5 INJECTION, SOLUTION INTRAMUSCULAR; INTRAVENOUS; SUBCUTANEOUS
Status: COMPLETED | OUTPATIENT
Start: 2023-04-09 | End: 2023-04-09

## 2023-04-09 RX ADMIN — HYDROMORPHONE HYDROCHLORIDE 0.5 MG: 0.5 INJECTION, SOLUTION INTRAMUSCULAR; INTRAVENOUS; SUBCUTANEOUS at 11:04

## 2023-04-10 VITALS
OXYGEN SATURATION: 99 % | HEART RATE: 78 BPM | DIASTOLIC BLOOD PRESSURE: 72 MMHG | WEIGHT: 198 LBS | TEMPERATURE: 98 F | RESPIRATION RATE: 16 BRPM | SYSTOLIC BLOOD PRESSURE: 160 MMHG | BODY MASS INDEX: 32.95 KG/M2

## 2023-04-10 PROCEDURE — 25500020 PHARM REV CODE 255: Performed by: EMERGENCY MEDICINE

## 2023-04-10 RX ORDER — LIDOCAINE 50 MG/G
1 PATCH TOPICAL DAILY
Qty: 11 PATCH | Refills: 0 | Status: SHIPPED | OUTPATIENT
Start: 2023-04-10

## 2023-04-10 RX ORDER — METHOCARBAMOL 500 MG/1
1500 TABLET, FILM COATED ORAL
Qty: 30 TABLET | Refills: 0 | Status: SHIPPED | OUTPATIENT
Start: 2023-04-10 | End: 2023-04-15

## 2023-04-10 RX ADMIN — IOHEXOL 100 ML: 350 INJECTION, SOLUTION INTRAVENOUS at 12:04

## 2023-04-10 NOTE — ED PROVIDER NOTES
Encounter Date: 2023       History     Chief Complaint   Patient presents with    Leg Pain     Upper leg pain 1 week, denies injury     52-year-old female presents complaining of right thigh pain patient reports that she has had this pain for the past week patient denies trauma patient has a history of diabetes, hypertension and hyperlipidemia patient denies fever cough or shortness of breath patient reports pain in the right thigh has been increasing despite being treated with pain medication patient reports that she is having trouble ambulating on this leg patient denies weakness or numbness patient reports some swelling but denies decreased range of motion.    Review of patient's allergies indicates:   Allergen Reactions    Ace inhibitors Other (See Comments)     Cough    Morphine Other (See Comments)     headache    Compazine [prochlorperazine] Anxiety     Past Medical History:   Diagnosis Date    Arthritis     bilateral knees and hands    Asthma     Bipolar 1 disorder     Diabetes mellitus     Hiatal hernia     HLD (hyperlipidemia)     Migraine headache     Morbid obesity 2016    S/P gastric bypass 2016    by     Shoulder injury     left shoulder rotator cuff repair     Past Surgical History:   Procedure Laterality Date     SECTION      COLONOSCOPY N/A 2019    Procedure: COLONOSCOPY;  Surgeon: Dudley Araiza MD;  Location: [facility];  Service: Endoscopy;  Laterality: N/A;    ESOPHAGOGASTRODUODENOSCOPY N/A 2019    Procedure: EGD (ESOPHAGOGASTRODUODENOSCOPY);  Surgeon: Dudley Araiza MD;  Location: [facility];  Service: Endoscopy;  Laterality: N/A;    GASTRIC BYPASS  2016        HYSTERECTOMY      INTRALUMINAL GASTROINTESTINAL TRACT IMAGING VIA CAPSULE N/A 2019    Procedure: IMAGING PROCEDURE, GI TRACT, INTRALUMINAL, VIA CAPSULE;  Surgeon: Dudley Araiza MD;  Location: [facility];  Service: Endoscopy;  Laterality: N/A;    JOINT REPLACEMENT       bilateral knees    ROTATOR CUFF REPAIR Left 2016    TOTAL KNEE ARTHROPLASTY Bilateral 2015     Family History   Problem Relation Age of Onset    Diabetes Mother     Hyperlipidemia Mother     Hypertension Mother     Heart disease Father     Hypertension Brother     Breast cancer Sister      Social History     Tobacco Use    Smoking status: Never    Smokeless tobacco: Never   Substance Use Topics    Alcohol use: No    Drug use: No     Review of Systems   Constitutional:  Negative for fever.   HENT:  Negative for congestion, rhinorrhea, sore throat and trouble swallowing.    Eyes:  Negative for visual disturbance.   Respiratory:  Negative for cough, chest tightness, shortness of breath and wheezing.    Cardiovascular:  Negative for chest pain, palpitations and leg swelling.   Gastrointestinal:  Negative for abdominal distention, abdominal pain, constipation, diarrhea, nausea and vomiting.   Genitourinary:  Negative for difficulty urinating, dysuria, flank pain and frequency.   Musculoskeletal:  Positive for arthralgias. Negative for back pain, joint swelling and neck pain.   Skin:  Negative for color change and rash.   Neurological:  Negative for dizziness, syncope, speech difficulty, weakness, numbness and headaches.   All other systems reviewed and are negative.    Physical Exam     Initial Vitals [04/09/23 2117]   BP Pulse Resp Temp SpO2   123/76 75 16 98.3 °F (36.8 °C) 100 %      MAP       --         Physical Exam    Nursing note and vitals reviewed.  Constitutional: She appears well-developed and well-nourished. She is not diaphoretic. No distress.   HENT:   Head: Normocephalic and atraumatic.   Right Ear: External ear normal.   Left Ear: External ear normal.   Nose: Nose normal.   Mouth/Throat: Oropharynx is clear and moist. No oropharyngeal exudate.   Eyes: Conjunctivae and EOM are normal. Pupils are equal, round, and reactive to light. Right eye exhibits no discharge. Left eye exhibits no discharge. No scleral  icterus.   Neck: Neck supple. No thyromegaly present. No tracheal deviation present. No JVD present.   Normal range of motion.  Cardiovascular:  Normal rate, regular rhythm, normal heart sounds and intact distal pulses.     Exam reveals no gallop and no friction rub.       No murmur heard.  Pulmonary/Chest: Breath sounds normal. No stridor. No respiratory distress. She has no wheezes. She has no rhonchi. She has no rales. She exhibits no tenderness.   Abdominal: Abdomen is soft. Bowel sounds are normal. She exhibits no distension and no mass. There is no abdominal tenderness. There is no rebound and no guarding.   Musculoskeletal:         General: Tenderness present. No edema. Normal range of motion.      Cervical back: Normal range of motion and neck supple.      Comments: Tenderness to palpation to the right thigh patient has 2+ distal pulses capillary refill less than 2 seconds patient has full range of motion of the ankle hip and knee patient has no ligamentous laxity patient has no erythema or skin discoloration compartments feel soft to palpation patient has no significant swelling noted patient has grossly normal physical exam other than discomfort to movement.     Lymphadenopathy:     She has no cervical adenopathy.   Neurological: She is alert and oriented to person, place, and time. She has normal strength. No cranial nerve deficit or sensory deficit.   Skin: Skin is warm and dry. No rash and no abscess noted. No erythema. No pallor.       ED Course   Procedures  Labs Reviewed   CBC W/ AUTO DIFFERENTIAL - Abnormal; Notable for the following components:       Result Value    RBC 3.96 (*)     Hemoglobin 11.5 (*)     Hematocrit 36.3 (*)     MCHC 31.7 (*)     RDW 14.8 (*)     All other components within normal limits   COMPREHENSIVE METABOLIC PANEL - Abnormal; Notable for the following components:    Calcium 8.4 (*)     Albumin 3.3 (*)     Anion Gap 7 (*)     All other components within normal limits    PROTIME-INR   CK          Imaging Results              CT Thigh With Contrast Right (Final result)  Result time 04/10/23 00:35:55      Final result by Larissa Blanco DO (04/10/23 00:35:55)                   Narrative:    EXAM:  CT Right Lower Extremity With Intravenous Contrast    FACILITY:  [facility]    REFERRING:  JORGE SELF    CLINICAL HISTORY:  52 years, Female; Soft tissue infection suspected, thigh, xray done increasing leg pain for one week .6    TECHNIQUE:  Axial computed tomography images of the right lower extremity with intravenous contrast.  Sagittal and coronal reformatted images were created and reviewed.  This CT exam was performed using one or more of the following dose reduction techniques:  automated exposure control, adjustment of the mA and/or kV according to patient size, and/or use of iterative reconstruction technique.    CONTRAST:  100 mL Omni 350    COMPARISON:  No relevant prior studies available.    FINDINGS:  Bones/joints:  There is no acute fracture or dislocation involving the right femur. The femoral head is seated within the acetabulum.  The sacroiliac joint on the right is not widened. There is mild degenerative change.  Minimal subchondral cyst formation at the superolateral acetabulum.  The pubic symphysis is not widened.  There is a right total knee prosthesis. Metallic artifact limits evaluation.  Cemented tibial component.  Enthesophyte at the insertion of the quadriceps tendon on the patella.  No evidence of osteomyelitis.  Soft tissues:  No acute pathology.  No muscular abnormality is noted within the right thigh.  No soft tissue gas.  Vasculature:  The right external and internal iliac arteries are patent. The right femoral artery and the profunda femoral artery as well as the superficial femoral artery and the popliteal arteries are patent.  Bladder:  The bladder is incompletely visualized. No wall thickening.  Other findings:  The appendix  is unremarkable.  No evidence of an abscess.    IMPRESSION:  1.  Right total knee prosthesis.  2.  No acute pathology is seen within the right thigh.    Electronically signed by:  Larissa Blanco DO  4/10/2023 12:35 AM CDT Workstation: [id number]                                     US Lower Extremity Veins Right (Final result)  Result time 04/09/23 22:46:01      Final result by Pako Almanzar MD (04/09/23 22:46:01)                   Narrative:    EXAM DESCRIPTION:    US LOWER EXTREMITY VEINS RIGHT  [facility]: US LOWER EXTREMITY VEINS LIMITED FOLLOW-UP UNILATERAL    CLINICAL HISTORY:    52 years  Female  evaluation for DVT    COMPARISON:    None    TECHNIQUE:    Duplex venous Ultrasound of the lower extremity was obtained.    FINDINGS:    There is normal flow and compressibility seen within the deep venous structures from the common femoral vein down to the posterior tibial vein. There is no evidence for deep venous thrombosis.    IMPRESSION:    1.  No evidence for DVT.    Electronically signed by:  Pako Capone MD, KENDALL  4/9/2023 10:46 PM CDT Workstation: [id number]                                     Medications   HYDROmorphone injection 0.5 mg (0.5 mg Intravenous Given 4/9/23 2332)   iohexoL (OMNIPAQUE 350) injection 100 mL (100 mLs Intravenous Given 4/10/23 0012)     Medical Decision Making:   History:   Old Medical Records: I decided to obtain old medical records.  Initial Assessment:   Emergent evaluation of a 52-year-old female presenting with right thigh pain differential diagnosis includes soft tissue injury, myositis, infection, musculoskeletal pain          Attending Attestation:             Attending ED Notes:   Patient's imaging shows no acute abnormalities patient's labs show no significant acute abnormalities patient is under a pain management contract and can not receive additional pain medication, patient will be given lidocaine patches and muscle relaxer patient is to follow up with her pain  management doctor and with Orthopedics in the next 2-3 days for further evaluation and management patient is cautioned to return immediately to the emergency department for any worsening or for any further concerns.  Patient offered crutches but declined, patient is to use a 4 point walker patient is to avoid ambulating on this leg and to rest this extremity patient reports she understands these instructions and will comply.  Patient has an appointment with her pain management doctor in 2 days.    MDM    Patient presents for emergent evaluation of possible acute complaint that  may pose a possible threat to life and/or bodily function.    I may have ordered labs and personally reviewed them. If applicable, Labs significant for abnormalities noted above.    I may have ordered X-rays and personally reviewed them and reviewed the radiologist interpretation.  If applicable, Xray significant for findings noted above.    I may have ordered EKG and personally reviewed it.  If applicable, EKG significant for findings noted above.    I may have ordered CT scan and personally reviewed it and reviewed the radiologist interpretation.  If applicable, CT significant for findings noted above.      I had a detailed discussion with the patient and/or guardian regarding: The historical points, exam findings, and diagnostic results supporting the discharge diagnosis, lab results, pertinent radiology results, and the need for outpatient follow-up, for definitive care with a family practitioner and to return to the emergency department if symptoms worsen or persist or if there are any questions or concerns that arise at home. All questions have been answered in detail. Strict return to Emergency Department precautions have been provided.     A dictation software program was used for this note.  Please expect some simple typographical  errors in this note.                        Clinical Impression:   Final diagnoses:  [R52]  Pain  [M79.604] Right leg pain (Primary)        ED Disposition Condition    Discharge Stable          ED Prescriptions       Medication Sig Dispense Start Date End Date Auth. Provider    LIDOcaine (LIDODERM) 5 % Place 1 patch onto the skin once daily. Remove & Discard patch within 12 hours or as directed by MD 11 patch 4/10/2023 -- Colin Hall MD    methocarbamoL (ROBAXIN) 500 MG Tab Take 3 tablets (1,500 mg total) by mouth after meals as needed (As needed for muscle soreness). 30 tablet 4/10/2023 4/15/2023 Colin Hall MD          Follow-up Information       Follow up With Specialties Details Why Contact Info Additional Information    Dru Bee MD Internal Medicine Schedule an appointment as soon as possible for a visit in 2 days  [address] [place] [address]  [phone]       [facility] - Emergency Dept Emergency Medicine  If symptoms worsen [address] [phone] 1st floor    ALYSA Ring MD Orthopedic Surgery Schedule an appointment as soon as possible for a visit in 2 days  [address] [facility] [address]  [place] [address]  [phone]                Colin Hall MD  04/10/23 0135

## 2023-04-10 NOTE — FIRST PROVIDER EVALUATION
Medical screening examination initiated.  I have conducted a focused provider triage encounter, findings are as follows:    Brief history of present illness:  Presents with right thigh pain.  Onset 1 week.  Denies injury.  Patient reports she cannot bear weight.  She was seen at [facility] Emergency Room and had a negative x-ray.    Vitals:    04/09/23 2117   BP: 123/76   BP Location: Left arm   Patient Position: Sitting   Pulse: 75   Resp: 16   Temp: 98.3 °F (36.8 °C)   TempSrc: Oral   SpO2: 100%   Weight: 89.8 kg (198 lb)       Pertinent physical exam:  Right thigh tender to the touch.  There is no swelling.    Brief workup plan:  Ultrasound right lower extremity.    Preliminary workup initiated; this workup will be continued and followed by the physician or advanced practice provider that is assigned to the patient when roomed.

## 2023-04-19 DIAGNOSIS — M25.551 RIGHT HIP PAIN: Primary | ICD-10-CM

## 2023-04-19 DIAGNOSIS — M89.8X5 PAIN IN RIGHT FEMUR: ICD-10-CM

## 2023-04-19 NOTE — DISCHARGE SUMMARY
[facility]         Saurabh Rocha controlled metropolol       [facility] Medicine  Discharge Summary      Patient Name: Bessie Elliott  MRN: [id number]  Admission Date: 8/14/2017  Hospital Length of Stay: 0 days  Discharge Date and Time: 8/16/2017  2:28 PM  Attending Physician: No att. providers found   Discharging Provider: Slick Angela MD  Primary Care Provider: SHAY Sanderson        HPI: Ms. Elliott presented to ER last night with abdominal pain.  Location:  Epigastric.  Radiation? No.  Associated with vomiting.  Duration: about one week.  Onset:  Sudden.  Severity: 10/10.  No relief after taking her pain killers at home.  She underwent gastric bypass surgery a year ago.  ER physician last night contacted the surgeon, who requested admission to hospitalist service.    Procedure(s) (LRB):  ESOPHAGOGASTRODUODENOSCOPY (EGD) (N/A)      Indwelling Lines/Drains at time of discharge:   Lines/Drains/Airways          No matching active lines, drains, or airways        Hospital Course:   Dr. Hensley ordered RUQ US, which came out normal.  He consulted GI.  EGD was done, showing healthy gastric mucosa.  She was anxious to be discharged.  While nurse was waiting for Dr. Hensley to call her back, patient left AMA.    Consults:   Consults         Status Ordering Provider     Inpatient consult to Gastroenterology  Once     Provider:  Dudley Araiza MD    Completed BERNICE HENSLEY     Inpatient consult to General Surgery  Once     Provider:  Bernice Hensley MD    Completed BEAU EDMONDS          Significant Diagnostic Studies:   Lab Results   Component Value Date    WBC 5.30 08/16/2017    HGB 10.2 (L) 08/16/2017    HCT 30.9 (L) 08/16/2017    MCV 87 08/16/2017     08/16/2017     BMP  Lab Results   Component Value Date     08/16/2017    K 3.6 08/16/2017     (H) 08/16/2017    CO2 22 (L) 08/16/2017    BUN 9 08/16/2017    CREATININE 0.9 08/16/2017    CALCIUM 8.6 (L) 08/16/2017    ANIONGAP 7 (L) 08/16/2017    ESTGFRAFRICA >60  (08/16/2017    EGFRNONAA >60 08/16/2017         Pending Diagnostic Studies:     None        Final Active Diagnoses:    Diagnosis Date Noted POA    PRINCIPAL PROBLEM:  Epigastric pain [R10.13] 06/13/2016 Yes      Problems Resolved During this Admission:    Diagnosis Date Noted Date Resolved POA      Discharged Condition: against medical advice    Disposition: Left Against Medical Adv*    Follow Up:  Patient left AMA.  No followup was scheduled    Patient Instructions:  Left AMA.  No instructions were able to be given.   No discharge procedures on file.     Medications:  Patient left AMA.  No med reconciliation was done.    Time spent on the discharge of patient: 15 minutes         Slick Angela MD  Department of Hospital Medicine  [facility]       Neurologic [x]   []     Pulmonary [x]   []       Physical Exam:   Within Normal Limits Abnormal   HEENT [x]    []     Heart [x]   []     Lungs [x]   []     Abdomen [x]   []     Extremities [x]   []       Impression: Right nuclear sclerotic cataract.     Plan: CATARACT PHACO EXTRACTION WITH INTRAOCULAR LENS IMPLANT RIGHT (Right)     Fermin Sheets MD  4/19/2023

## 2023-04-20 ENCOUNTER — OFFICE VISIT (OUTPATIENT)
Dept: ORTHOPEDICS | Facility: CLINIC | Age: 53
End: 2023-04-20
Payer: MEDICARE

## 2023-04-20 ENCOUNTER — HOSPITAL ENCOUNTER (OUTPATIENT)
Dept: RADIOLOGY | Facility: HOSPITAL | Age: 53
Discharge: HOME | End: 2023-04-20
Attending: ORTHOPAEDIC SURGERY
Payer: MEDICARE

## 2023-04-20 VITALS — HEIGHT: 65 IN | WEIGHT: 198 LBS | RESPIRATION RATE: 16 BRPM | BODY MASS INDEX: 32.99 KG/M2

## 2023-04-20 DIAGNOSIS — M89.8X5 PAIN IN RIGHT FEMUR: ICD-10-CM

## 2023-04-20 DIAGNOSIS — S72.001A CLOSED FRACTURE OF NECK OF RIGHT FEMUR, INITIAL ENCOUNTER: Primary | ICD-10-CM

## 2023-04-20 PROCEDURE — 1160F PR REVIEW ALL MEDS BY PRESCRIBER/CLIN PHARMACIST DOCUMENTED: ICD-10-PCS | Mod: CPTII,S$GLB,, | Performed by: ORTHOPAEDIC SURGERY

## 2023-04-20 PROCEDURE — 99999 PR PBB SHADOW E&M-EST. PATIENT-LVL IV: CPT | Mod: PBBFAC,,, | Performed by: ORTHOPAEDIC SURGERY

## 2023-04-20 PROCEDURE — 99204 PR OFFICE/OUTPT VISIT, NEW, LEVL IV, 45-59 MIN: ICD-10-PCS | Mod: 57,S$GLB,, | Performed by: ORTHOPAEDIC SURGERY

## 2023-04-20 PROCEDURE — 1159F MED LIST DOCD IN RCRD: CPT | Mod: CPTII,S$GLB,, | Performed by: ORTHOPAEDIC SURGERY

## 2023-04-20 PROCEDURE — 27230 PR CLOSED RX PROX/NECK FEMUR FX: ICD-10-PCS | Mod: RT,S$GLB,, | Performed by: ORTHOPAEDIC SURGERY

## 2023-04-20 PROCEDURE — 73552 X-RAY EXAM OF FEMUR 2/>: CPT | Mod: 26,RT,, | Performed by: RADIOLOGY

## 2023-04-20 PROCEDURE — 3008F PR BODY MASS INDEX (BMI) DOCUMENTED: ICD-10-PCS | Mod: CPTII,S$GLB,, | Performed by: ORTHOPAEDIC SURGERY

## 2023-04-20 PROCEDURE — 27230 TREAT THIGH FRACTURE: CPT | Mod: RT,S$GLB,, | Performed by: ORTHOPAEDIC SURGERY

## 2023-04-20 PROCEDURE — 1160F RVW MEDS BY RX/DR IN RCRD: CPT | Mod: CPTII,S$GLB,, | Performed by: ORTHOPAEDIC SURGERY

## 2023-04-20 PROCEDURE — 1159F PR MEDICATION LIST DOCUMENTED IN MEDICAL RECORD: ICD-10-PCS | Mod: CPTII,S$GLB,, | Performed by: ORTHOPAEDIC SURGERY

## 2023-04-20 PROCEDURE — 3008F BODY MASS INDEX DOCD: CPT | Mod: CPTII,S$GLB,, | Performed by: ORTHOPAEDIC SURGERY

## 2023-04-20 PROCEDURE — 73552 XR FEMUR 2 VIEW RIGHT: ICD-10-PCS | Mod: 26,RT,, | Performed by: RADIOLOGY

## 2023-04-20 PROCEDURE — 73552 X-RAY EXAM OF FEMUR 2/>: CPT | Mod: TC,PN,RT

## 2023-04-20 PROCEDURE — 99204 OFFICE O/P NEW MOD 45 MIN: CPT | Mod: 57,S$GLB,, | Performed by: ORTHOPAEDIC SURGERY

## 2023-04-20 PROCEDURE — 99999 PR PBB SHADOW E&M-EST. PATIENT-LVL IV: ICD-10-PCS | Mod: PBBFAC,,, | Performed by: ORTHOPAEDIC SURGERY

## 2023-04-20 NOTE — PROGRESS NOTES
Subjective:      Patient ID: Bessie Elliott is a 52 y.o. female.    Chief Complaint: Injury and Pain of the Right Hip    HPI  52-year-old female with a one-month history of right hip pain.  Denies any trauma.  Was seen elsewhere had an MRI obtained diagnosed with a stress fracture of the femoral neck and referred for further evaluation.  Pain is improved with relative rest and weight-bearing restrictions.  ROS      Objective:    Ortho Exam     Constitutional:   Patient is alert  and oriented in no acute distress  HEENT:  normocephalic atraumatic; PERRL EOMI  Neck:  Supple without adenopathy  Cardiovascular:  Normal rate and rhythm  Pulmonary:  Normal respiratory effort normal chest wall expansion  Abdominal:  Nonprotuberant nondistended  Musculoskeletal:  Patient ambulates with partial weight-bearing with a walker  She has mild tenderness over the right SI joint to a lesser extent over the greater trochanter.  She has difficulty with full active hip extension against resistance she does have adequate passive range of motion with no discomfort  Neurological:  No focal defect; cranial nerves 2-12 grossly intact  Psychiatric/behavioral:  Mood and behavior normal    X-Ray Femur 2 View Right  Narrative: EXAMINATION:  XR FEMUR 2 VIEW RIGHT    CLINICAL HISTORY:  Other specified disorders of bone, thigh    TECHNIQUE:  AP and lateral views of the right femur were performed.    COMPARISON:  None    FINDINGS:  There is no acute fracture or dislocation.  There is no significant soft tissue swelling.    The femoral head is normally positioned within the native acetabulum.  Mild femoroacetabular degenerative osteoarthrosis.    Previous total knee arthroplasty.  There is a small suprapatellar effusion.  Impression: 1. Mild degenerative osteoarthrosis of the hip.  2. Prior total knee arthroplasty  3. Small suprapatellar effusion.    Electronically signed by: Christo Guzman  Date:    04/20/2023  Time:    13:28       My  Findings:    I have personally reviewed radiographs and concur with above findings    Assessment:       Encounter Diagnosis   Name Primary?    Closed fracture of neck of right femur, initial encounter Yes         Plan:       I have discussed medical condition treatment options with her at length.  She has a report of an MRIs consistent with a femoral neck stress fracture that is not available for my review.  I have had a lengthy discussion with the concerning her treatment options she declines any consideration for prophylactic pinning at this point.  I think it reasonable as long as she would be she can be cautious with the weight-bearing status however if she begins to have increased pain and we may need to consider said surgery.  I have suggested follow up in 4-6 weeks with repeat radiographs may need to consider repeat CT/MRI.        Past Medical History:   Diagnosis Date    Arthritis     bilateral knees and hands    Asthma     Bipolar 1 disorder     Diabetes mellitus     Hiatal hernia     HLD (hyperlipidemia)     Migraine headache     Morbid obesity 2016    S/P gastric bypass 2016    by     Shoulder injury     left shoulder rotator cuff repair     Past Surgical History:   Procedure Laterality Date     SECTION      COLONOSCOPY N/A 2019    Procedure: COLONOSCOPY;  Surgeon: Dudley Araiza MD;  Location: [facility];  Service: Endoscopy;  Laterality: N/A;    ESOPHAGOGASTRODUODENOSCOPY N/A 2019    Procedure: EGD (ESOPHAGOGASTRODUODENOSCOPY);  Surgeon: Dudley Araiza MD;  Location: [facility];  Service: Endoscopy;  Laterality: N/A;    GASTRIC BYPASS  2016        HYSTERECTOMY      INTRALUMINAL GASTROINTESTINAL TRACT IMAGING VIA CAPSULE N/A 2019    Procedure: IMAGING PROCEDURE, GI TRACT, INTRALUMINAL, VIA CAPSULE;  Surgeon: Dudley Araiza MD;  Location: [facility];  Service: Endoscopy;  Laterality: N/A;    JOINT REPLACEMENT      bilateral knees    ROTATOR  CUFF REPAIR Left 2016    TOTAL KNEE ARTHROPLASTY Bilateral 2015         Current Outpatient Medications:     albuterol (PROVENTIL) 2.5 mg /3 mL (0.083 %) nebulizer solution, Take 2.5 mg by nebulization every 6 (six) hours as needed for Wheezing or Shortness of Breath. , Disp: , Rfl:     ALBUTEROL SULFATE (VENTOLIN HFA INHL), Inhale 1 puff into the lungs once daily. , Disp: , Rfl:     amitriptyline (ELAVIL) 150 MG Tab, Take 150 mg by mouth nightly., Disp: , Rfl:     celecoxib (CELEBREX) 100 MG capsule, Take 100 mg by mouth once daily., Disp: , Rfl:     cyanocobalamin 1,000 mcg/mL injection, Inject 1,000 mcg into the muscle every 30 days. , Disp: , Rfl:     dextroamphetamine-amphetamine (ADDERALL XR) 30 MG 24 hr capsule, Take 30 mg by mouth every morning. , Disp: , Rfl: 0    epinastine 0.05 % ophthalmic solution, Place 1 drop into both eyes 2 (two) times daily., Disp: , Rfl: 99    estradiol (ESTRACE) 1 MG tablet, Take 1 mg by mouth once daily., Disp: , Rfl: 11    gabapentin (NEURONTIN) 300 MG capsule, Take 300 mg by mouth 2 (two) times daily. , Disp: , Rfl:     hydrocodone-acetaminophen 7.5-325mg (NORCO) 7.5-325 mg per tablet, Take 1-2 tablets by mouth every 4 (four) hours as needed for Pain., Disp: 40 tablet, Rfl: 0    lamotrigine (LAMICTAL) 150 MG Tab, Take 300 mg by mouth every evening. , Disp: , Rfl:     LIDOcaine (LIDODERM) 5 %, Place 1 patch onto the skin once daily. Remove & Discard patch within 12 hours or as directed by MD, Disp: 11 patch, Rfl: 0    multivitamin (THERAGRAN) per tablet, Take 1 tablet by mouth once daily., Disp: , Rfl:     olopatadine (PATADAY) 0.2 % Drop, Place 1 drop into both eyes once daily., Disp: , Rfl:     ondansetron (ZOFRAN-ODT) 4 MG TbDL, Take 1 tablet (4 mg total) by mouth every 6 (six) hours as needed., Disp: 30 tablet, Rfl: 3    sertraline (ZOLOFT) 100 MG tablet, Take 150 mg by mouth every evening. , Disp: , Rfl:     sulindac (CLINORIL) 200 MG Tab, Take 200 mg by mouth 2 (two) times  daily. HCS, Disp: , Rfl:     sumatriptan (IMITREX STATDOSE) 6 mg/0.5 mL kit, Inject 6 mg into the skin every 2 (two) hours as needed for Migraine., Disp: , Rfl:     sumatriptan (IMITREX) 100 MG tablet, Take 100 mg by mouth every 2 (two) hours as needed for Migraine., Disp: , Rfl:     tiZANidine (ZANAFLEX) 4 MG tablet, Take 4 mg by mouth 2 (two) times a day. , Disp: , Rfl:     amoxicillin (AMOXIL) 500 MG capsule, Take 500 mg by mouth every 8 (eight) hours., Disp: , Rfl:     dexlansoprazole (DEXILANT) 60 mg capsule, Take 1 capsule (60 mg total) by mouth once daily., Disp: 90 capsule, Rfl: 3    dextroamphetamine-amphetamine (ADDERALL) 20 mg tablet, Take 20 mg by mouth once daily. , Disp: , Rfl:     fluticasone-salmeterol diskus inhaler 100-50 mcg, Inhale 1 puff into the lungs 2 (two) times daily., Disp: , Rfl:     pramipexole (MIRAPEX) 0.25 MG tablet, Take 0.25 mg by mouth 2 (two) times daily., Disp: , Rfl:     simvastatin (ZOCOR) 20 MG tablet, Take 1 tablet (20 mg total) by mouth every evening., Disp: 90 tablet, Rfl: 3    topiramate (TOPAMAX) 25 MG tablet, Take 1 tablet (25 mg total) by mouth 2 (two) times daily., Disp: 60 tablet, Rfl: 0    Review of patient's allergies indicates:   Allergen Reactions    Ace inhibitors Other (See Comments)     Cough    Morphine Other (See Comments)     headache    Compazine [prochlorperazine] Anxiety       Family History   Problem Relation Age of Onset    Diabetes Mother     Hyperlipidemia Mother     Hypertension Mother     Heart disease Father     Hypertension Brother     Breast cancer Sister      Social History     Occupational History    Not on file   Tobacco Use    Smoking status: Never    Smokeless tobacco: Never   Substance and Sexual Activity    Alcohol use: No    Drug use: No    Sexual activity: Not on file

## 2023-05-24 DIAGNOSIS — S72.001A CLOSED FRACTURE OF NECK OF RIGHT FEMUR, INITIAL ENCOUNTER: Primary | ICD-10-CM

## 2023-05-25 ENCOUNTER — OFFICE VISIT (OUTPATIENT)
Dept: ORTHOPEDICS | Facility: CLINIC | Age: 53
End: 2023-05-25
Payer: MEDICARE

## 2023-05-25 ENCOUNTER — HOSPITAL ENCOUNTER (OUTPATIENT)
Dept: RADIOLOGY | Facility: HOSPITAL | Age: 53
Discharge: HOME | End: 2023-05-25
Attending: ORTHOPAEDIC SURGERY
Payer: MEDICARE

## 2023-05-25 VITALS — RESPIRATION RATE: 17 BRPM | BODY MASS INDEX: 32.82 KG/M2 | WEIGHT: 197 LBS | HEIGHT: 65 IN

## 2023-05-25 DIAGNOSIS — S72.001A CLOSED FRACTURE OF NECK OF RIGHT FEMUR, INITIAL ENCOUNTER: ICD-10-CM

## 2023-05-25 DIAGNOSIS — S72.001S CLOSED FRACTURE OF NECK OF RIGHT FEMUR, SEQUELA: ICD-10-CM

## 2023-05-25 DIAGNOSIS — S72.001A CLOSED FRACTURE OF NECK OF RIGHT FEMUR, INITIAL ENCOUNTER: Primary | ICD-10-CM

## 2023-05-25 PROCEDURE — 1159F PR MEDICATION LIST DOCUMENTED IN MEDICAL RECORD: ICD-10-PCS | Mod: CPTII,S$GLB,, | Performed by: ORTHOPAEDIC SURGERY

## 2023-05-25 PROCEDURE — 3008F BODY MASS INDEX DOCD: CPT | Mod: CPTII,S$GLB,, | Performed by: ORTHOPAEDIC SURGERY

## 2023-05-25 PROCEDURE — 99024 POSTOP FOLLOW-UP VISIT: CPT | Mod: S$GLB,,, | Performed by: ORTHOPAEDIC SURGERY

## 2023-05-25 PROCEDURE — 73502 X-RAY EXAM HIP UNI 2-3 VIEWS: CPT | Mod: TC,PN,RT

## 2023-05-25 PROCEDURE — 99024 PR POST-OP FOLLOW-UP VISIT: ICD-10-PCS | Mod: S$GLB,,, | Performed by: ORTHOPAEDIC SURGERY

## 2023-05-25 PROCEDURE — 73502 XR HIP WITH PELVIS WHEN PERFORMED, 2 OR 3  VIEWS RIGHT: ICD-10-PCS | Mod: 26,RT,, | Performed by: RADIOLOGY

## 2023-05-25 PROCEDURE — 3008F PR BODY MASS INDEX (BMI) DOCUMENTED: ICD-10-PCS | Mod: CPTII,S$GLB,, | Performed by: ORTHOPAEDIC SURGERY

## 2023-05-25 PROCEDURE — 99999 PR PBB SHADOW E&M-EST. PATIENT-LVL IV: CPT | Mod: PBBFAC,,, | Performed by: ORTHOPAEDIC SURGERY

## 2023-05-25 PROCEDURE — 1159F MED LIST DOCD IN RCRD: CPT | Mod: CPTII,S$GLB,, | Performed by: ORTHOPAEDIC SURGERY

## 2023-05-25 PROCEDURE — 99999 PR PBB SHADOW E&M-EST. PATIENT-LVL IV: ICD-10-PCS | Mod: PBBFAC,,, | Performed by: ORTHOPAEDIC SURGERY

## 2023-05-25 PROCEDURE — 1160F PR REVIEW ALL MEDS BY PRESCRIBER/CLIN PHARMACIST DOCUMENTED: ICD-10-PCS | Mod: CPTII,S$GLB,, | Performed by: ORTHOPAEDIC SURGERY

## 2023-05-25 PROCEDURE — 1160F RVW MEDS BY RX/DR IN RCRD: CPT | Mod: CPTII,S$GLB,, | Performed by: ORTHOPAEDIC SURGERY

## 2023-05-25 PROCEDURE — 73502 X-RAY EXAM HIP UNI 2-3 VIEWS: CPT | Mod: 26,RT,, | Performed by: RADIOLOGY

## 2023-05-25 NOTE — PROGRESS NOTES
Subjective:      Patient ID: Bessie Elliott is a 52 y.o. female.    Chief Complaint: Pain of the Right Hip    HPI    Patient follow up on her right femoral neck fracture.  She states she is still having a good bit of pain and it maybe increased of late.  She is tried to be consistent with weight-bearing restrictions  ROS      Objective:    Ortho Exam      She comes in with a walker today.    She has diffuse tenderness over the greater trochanter   She has adequate hip range of motion but does have pain with internal rotation fully   Negative heel tap    X-Ray Hip 2 or 3 views Right (with Pelvis when performed)  Narrative: EXAMINATION:  XR HIP WITH PELVIS WHEN PERFORMED, 2 OR 3  VIEWS RIGHT    CLINICAL HISTORY:  Fracture of unspecified part of neck of right femur, initial encounter for closed fracture    TECHNIQUE:  AP pelvis and two views of the right hip.    COMPARISON:  None    FINDINGS:  No acute fracture or dislocation.  No significant soft tissue swelling.    Femoral head is normally positioned within the native acetabulum.  Mild femoroacetabular degenerative osteoarthrosis with mild joint space narrowing and small osteophyte formation.    Pubic symphysis is intact.  SI joints are intact.  Bilateral pubic rami are intact.  Stimulator device projects over the left iliac wing.  There is bone demineralization.  Impression: Mild degenerative osteoarthrosis of the right hip.    Electronically signed by: Christo Guzman  Date:    05/25/2023  Time:    11:41       My Radiographs Findings:    I have personally reviewed radiographs and concur with above findings    Assessment:       Encounter Diagnoses   Name Primary?    Closed fracture of neck of right femur, initial encounter Yes    Closed fracture of neck of right femur, sequela          Plan:        With ongoing what she describes as progressive pain I have suggested a repeat MRI of her femoral neck and hip to make sure that there is no progression of the reported  stress fracture from previous MRI.  I will see her back afterwards for more definitive disposition sooner if any questions or problems.  Continue with weight-bearing        Past Medical History:   Diagnosis Date    Arthritis     bilateral knees and hands    Asthma     Bipolar 1 disorder     Diabetes mellitus     Hiatal hernia     HLD (hyperlipidemia)     Migraine headache     Morbid obesity 2016    S/P gastric bypass 2016    by     Shoulder injury     left shoulder rotator cuff repair     Past Surgical History:   Procedure Laterality Date     SECTION      COLONOSCOPY N/A 2019    Procedure: COLONOSCOPY;  Surgeon: Dudley Araiza MD;  Location: [facility];  Service: Endoscopy;  Laterality: N/A;    ESOPHAGOGASTRODUODENOSCOPY N/A 2019    Procedure: EGD (ESOPHAGOGASTRODUODENOSCOPY);  Surgeon: Dudley Araiza MD;  Location: [facility] END[facility];  Service: Endoscopy;  Laterality: N/A;    GASTRIC BYPASS  2016        HYSTERECTOMY      INTRALUMINAL GASTROINTESTINAL TRACT IMAGING VIA CAPSULE N/A 2019    Procedure: IMAGING PROCEDURE, GI TRACT, INTRALUMINAL, VIA CAPSULE;  Surgeon: Dudley Araiza MD;  Location: [facility] ENDO;  Service: Endoscopy;  Laterality: N/A;    JOINT REPLACEMENT      bilateral knees    ROTATOR CUFF REPAIR Left 2016    TOTAL KNEE ARTHROPLASTY Bilateral 2015         Current Outpatient Medications:     albuterol (PROVENTIL) 2.5 mg /3 mL (0.083 %) nebulizer solution, Take 2.5 mg by nebulization every 6 (six) hours as needed for Wheezing or Shortness of Breath. , Disp: , Rfl:     ALBUTEROL SULFATE (VENTOLIN HFA INHL), Inhale 1 puff into the lungs once daily. , Disp: , Rfl:     amitriptyline (ELAVIL) 150 MG Tab, Take 150 mg by mouth nightly., Disp: , Rfl:     amoxicillin (AMOXIL) 500 MG capsule, Take 500 mg by mouth every 8 (eight) hours., Disp: , Rfl:     celecoxib (CELEBREX) 100 MG capsule, Take 100 mg by mouth once daily., Disp: , Rfl:     cyanocobalamin 1,000  mcg/mL injection, Inject 1,000 mcg into the muscle every 30 days. , Disp: , Rfl:     dexlansoprazole (DEXILANT) 60 mg capsule, Take 1 capsule (60 mg total) by mouth once daily., Disp: 90 capsule, Rfl: 3    dextroamphetamine-amphetamine (ADDERALL XR) 30 MG 24 hr capsule, Take 30 mg by mouth every morning. , Disp: , Rfl: 0    dextroamphetamine-amphetamine (ADDERALL) 20 mg tablet, Take 20 mg by mouth once daily. , Disp: , Rfl:     epinastine 0.05 % ophthalmic solution, Place 1 drop into both eyes 2 (two) times daily., Disp: , Rfl: 99    estradiol (ESTRACE) 1 MG tablet, Take 1 mg by mouth once daily., Disp: , Rfl: 11    fluticasone-salmeterol diskus inhaler 100-50 mcg, Inhale 1 puff into the lungs 2 (two) times daily., Disp: , Rfl:     gabapentin (NEURONTIN) 300 MG capsule, Take 300 mg by mouth 2 (two) times daily. , Disp: , Rfl:     hydrocodone-acetaminophen 7.5-325mg (NORCO) 7.5-325 mg per tablet, Take 1-2 tablets by mouth every 4 (four) hours as needed for Pain., Disp: 40 tablet, Rfl: 0    lamotrigine (LAMICTAL) 150 MG Tab, Take 300 mg by mouth every evening. , Disp: , Rfl:     LIDOcaine (LIDODERM) 5 %, Place 1 patch onto the skin once daily. Remove & Discard patch within 12 hours or as directed by MD, Disp: 11 patch, Rfl: 0    multivitamin (THERAGRAN) per tablet, Take 1 tablet by mouth once daily., Disp: , Rfl:     olopatadine (PATADAY) 0.2 % Drop, Place 1 drop into both eyes once daily., Disp: , Rfl:     ondansetron (ZOFRAN-ODT) 4 MG TbDL, Take 1 tablet (4 mg total) by mouth every 6 (six) hours as needed., Disp: 30 tablet, Rfl: 3    pramipexole (MIRAPEX) 0.25 MG tablet, Take 0.25 mg by mouth 2 (two) times daily., Disp: , Rfl:     sertraline (ZOLOFT) 100 MG tablet, Take 150 mg by mouth every evening. , Disp: , Rfl:     simvastatin (ZOCOR) 20 MG tablet, Take 1 tablet (20 mg total) by mouth every evening., Disp: 90 tablet, Rfl: 3    sulindac (CLINORIL) 200 MG Tab, Take 200 mg by mouth 2 (two) times daily. HCS, Disp: ,  Rfl:     sumatriptan (IMITREX STATDOSE) 6 mg/0.5 mL kit, Inject 6 mg into the skin every 2 (two) hours as needed for Migraine., Disp: , Rfl:     sumatriptan (IMITREX) 100 MG tablet, Take 100 mg by mouth every 2 (two) hours as needed for Migraine., Disp: , Rfl:     tiZANidine (ZANAFLEX) 4 MG tablet, Take 4 mg by mouth 2 (two) times a day. , Disp: , Rfl:     topiramate (TOPAMAX) 25 MG tablet, Take 1 tablet (25 mg total) by mouth 2 (two) times daily., Disp: 60 tablet, Rfl: 0    Review of patient's allergies indicates:   Allergen Reactions    Ace inhibitors Other (See Comments)     Cough    Morphine Other (See Comments)     headache    Compazine [prochlorperazine] Anxiety       Family History   Problem Relation Age of Onset    Diabetes Mother     Hyperlipidemia Mother     Hypertension Mother     Heart disease Father     Hypertension Brother     Breast cancer Sister      Social History     Occupational History    Not on file   Tobacco Use    Smoking status: Never    Smokeless tobacco: Never   Substance and Sexual Activity    Alcohol use: No    Drug use: No    Sexual activity: Not on file

## 2023-06-26 ENCOUNTER — TELEPHONE (OUTPATIENT)
Dept: ORTHOPEDICS | Facility: CLINIC | Age: 53
End: 2023-06-26
Payer: MEDICARE

## 2023-06-26 NOTE — TELEPHONE ENCOUNTER
----- Message from Hao Velez sent at 6/26/2023 10:20 AM CDT -----  Type: Needs Medical Advice  Who Called:  Patient    Best Call Back Number: [phone]  Additional Information: Patient states that she would like a callback regarding her MRI and her pain pump.  MRI is scheduled on 06/27/23    Please call or text Rich for the pain pump drainage at [phone]

## 2023-06-27 ENCOUNTER — TELEPHONE (OUTPATIENT)
Dept: ORTHOPEDICS | Facility: CLINIC | Age: 53
End: 2023-06-27
Payer: MEDICARE

## 2023-06-27 ENCOUNTER — HOSPITAL ENCOUNTER (OUTPATIENT)
Dept: RADIOLOGY | Facility: HOSPITAL | Age: 53
Discharge: HOME | End: 2023-06-27
Attending: ORTHOPAEDIC SURGERY
Payer: MEDICARE

## 2023-06-27 DIAGNOSIS — S72.001S CLOSED FRACTURE OF NECK OF RIGHT FEMUR, SEQUELA: ICD-10-CM

## 2023-06-27 PROCEDURE — 73721 MRI JNT OF LWR EXTRE W/O DYE: CPT | Mod: TC,RT

## 2023-06-27 PROCEDURE — 73721 MRI HIP WITHOUT CONTRAST RIGHT: ICD-10-PCS | Mod: 26,RT,, | Performed by: RADIOLOGY

## 2023-06-27 PROCEDURE — 73721 MRI JNT OF LWR EXTRE W/O DYE: CPT | Mod: 26,RT,, | Performed by: RADIOLOGY

## 2023-06-27 NOTE — TELEPHONE ENCOUNTER
----- Message from Andre Kennedy MD sent at 6/27/2023  1:45 PM CDT -----  Please alert patient to use walker for WB until I can reeval her in office.

## 2023-06-29 ENCOUNTER — OFFICE VISIT (OUTPATIENT)
Dept: ORTHOPEDICS | Facility: CLINIC | Age: 53
End: 2023-06-29
Payer: MEDICARE

## 2023-06-29 DIAGNOSIS — S72.001A CLOSED FRACTURE OF NECK OF RIGHT FEMUR, INITIAL ENCOUNTER: Primary | ICD-10-CM

## 2023-06-29 PROCEDURE — 1159F MED LIST DOCD IN RCRD: CPT | Mod: CPTII,S$GLB,, | Performed by: ORTHOPAEDIC SURGERY

## 2023-06-29 PROCEDURE — 99214 PR OFFICE/OUTPT VISIT, EST, LEVL IV, 30-39 MIN: ICD-10-PCS | Mod: 24,S$GLB,, | Performed by: ORTHOPAEDIC SURGERY

## 2023-06-29 PROCEDURE — 99214 OFFICE O/P EST MOD 30 MIN: CPT | Mod: 24,S$GLB,, | Performed by: ORTHOPAEDIC SURGERY

## 2023-06-29 PROCEDURE — 1160F PR REVIEW ALL MEDS BY PRESCRIBER/CLIN PHARMACIST DOCUMENTED: ICD-10-PCS | Mod: CPTII,S$GLB,, | Performed by: ORTHOPAEDIC SURGERY

## 2023-06-29 PROCEDURE — 99999 PR PBB SHADOW E&M-EST. PATIENT-LVL III: CPT | Mod: PBBFAC,,, | Performed by: ORTHOPAEDIC SURGERY

## 2023-06-29 PROCEDURE — 1160F RVW MEDS BY RX/DR IN RCRD: CPT | Mod: CPTII,S$GLB,, | Performed by: ORTHOPAEDIC SURGERY

## 2023-06-29 PROCEDURE — 1159F PR MEDICATION LIST DOCUMENTED IN MEDICAL RECORD: ICD-10-PCS | Mod: CPTII,S$GLB,, | Performed by: ORTHOPAEDIC SURGERY

## 2023-06-29 PROCEDURE — 99999 PR PBB SHADOW E&M-EST. PATIENT-LVL III: ICD-10-PCS | Mod: PBBFAC,,, | Performed by: ORTHOPAEDIC SURGERY

## 2023-06-29 NOTE — H&P (VIEW-ONLY)
Subjective:      Patient ID: Bessie Elliott is a 52 y.o. female.    Chief Complaint: Results and Pain of the Right Hip    HPI  Patient is a for follow-up on her right hip.  She states that her pain has progressed she has had an additional fall and an MRI since her last visit.  ROS      Objective:    Ortho Exam     Constitutional:   Patient is alert  and oriented in no acute distress  HEENT:  normocephalic atraumatic; PERRL EOMI  Neck:  Supple without adenopathy  Cardiovascular:  Normal rate and rhythm  Pulmonary:  Normal respiratory effort normal chest wall expansion  Abdominal:  Nonprotuberant nondistended  Musculoskeletal:  She comes in using a walker she is partial weight-bearing  She has mild discomfort with full internal rotation of her right hip  Neurological:  No focal defect; cranial nerves 2-12 grossly intact  Psychiatric/behavioral:  Mood and behavior normal      MRI Hip Without Contrast Right  Narrative: EXAMINATION:  MRI HIP WITHOUT CONTRAST RIGHT    CLINICAL HISTORY:  Fracture, hip;  Fracture of unspecified part of neck of right femur, sequela    TECHNIQUE:  Multiplanar/multisequence MRI of the right hip was performed without the use of intravenous or intra-articular gadolinium.    COMPARISON:  Plain films 05/25/2023.  Report of outside MRI performed on 04/18/2023.  Films not available for direct comparison.  CT 04/09/2023.    FINDINGS:  Bone: There is a late subacute incomplete stress fracture extending from the medial cortex of the intertrochanteric proximal right femur distally into the central medullary canal of the femoral diametaphysis.  There is heterogeneous subacute bone marrow edema of the right femoral neck, intertrochanteric femur and proximal femoral shaft.    Labrum: Grossly intact noting poor sensitivity for labral tear secondary to non arthrographic technique.  Also metallic susceptibility artifact secondary to stimulator device within the left hip.    Cartilage: No large articular  cartilage defect demonstrated on this non arthrographic study.    Tendons: There is mild tendinosis at the origin of the right common hamstring tendon.    Miscellaneous: Limited evaluation of pelvic soft tissues is unremarkable.  Impression: 1. Late subacute right intertrochanteric and proximal shaft stress fracture with associated bone marrow edema.  2. Mild tendinosis at the origin of the right common hamstring.  3. No significant joint effusion.  This report was flagged in [employer] as abnormal.    Electronically signed by: Christo Guzman  Date:    06/27/2023  Time:    11:45       My Radiographs Findings:    I have personally reviewed radiographs and concur with above findings    Assessment:       Encounter Diagnosis   Name Primary?    Closed fracture of neck of right femur, initial encounter Yes         Plan:       I have discussed medical condition treatment options with her at length.  After review of her MRI and here clinical history of suggested consideration prophylactic intramedullary nailing of her right hip.  I have discussed indications alternatives and potential complications of the planned procedure including but not limited to bleeding infection damage to neurovascular structures progression of her fractures periprosthetic fractures hardware failure need for further surgery ongoing pain.  Patient expressed understanding agrees to proceed.  There history physical preoperative paperwork was accomplished all questions were answered in layman's terms she could understand stand.  We will get medical anesthetic and insurance preauthorization and clearance meantime, keep her protected weight-bearing.  Follow-up with me approximately 10 days postop for suture or staple removal sooner if any questions or problems.        Past Medical History:   Diagnosis Date    Arthritis     bilateral knees and hands    Asthma     Bipolar 1 disorder     Diabetes mellitus     Hiatal hernia     HLD (hyperlipidemia)     Migraine  headache     Morbid obesity 2016    S/P gastric bypass 2016    by     Shoulder injury     left shoulder rotator cuff repair     Past Surgical History:   Procedure Laterality Date     SECTION      COLONOSCOPY N/A 2019    Procedure: COLONOSCOPY;  Surgeon: Dudley Araiza MD;  Location: [facility] END[facility];  Service: Endoscopy;  Laterality: N/A;    ESOPHAGOGASTRODUODENOSCOPY N/A 2019    Procedure: EGD (ESOPHAGOGASTRODUODENOSCOPY);  Surgeon: Dudley Araiza MD;  Location: [facility] ENDO;  Service: Endoscopy;  Laterality: N/A;    GASTRIC BYPASS  2016        HYSTERECTOMY      INTRALUMINAL GASTROINTESTINAL TRACT IMAGING VIA CAPSULE N/A 2019    Procedure: IMAGING PROCEDURE, GI TRACT, INTRALUMINAL, VIA CAPSULE;  Surgeon: Dudley Araiza MD;  Location: [facility] ENDO;  Service: Endoscopy;  Laterality: N/A;    JOINT REPLACEMENT      bilateral knees    ROTATOR CUFF REPAIR Left 2016    TOTAL KNEE ARTHROPLASTY Bilateral          Current Outpatient Medications:     albuterol (PROVENTIL) 2.5 mg /3 mL (0.083 %) nebulizer solution, Take 2.5 mg by nebulization every 6 (six) hours as needed for Wheezing or Shortness of Breath. , Disp: , Rfl:     ALBUTEROL SULFATE (VENTOLIN HFA INHL), Inhale 1 puff into the lungs once daily. , Disp: , Rfl:     amitriptyline (ELAVIL) 150 MG Tab, Take 150 mg by mouth nightly., Disp: , Rfl:     amoxicillin (AMOXIL) 500 MG capsule, Take 500 mg by mouth every 8 (eight) hours., Disp: , Rfl:     celecoxib (CELEBREX) 100 MG capsule, Take 100 mg by mouth once daily., Disp: , Rfl:     cyanocobalamin 1,000 mcg/mL injection, Inject 1,000 mcg into the muscle every 30 days. , Disp: , Rfl:     dexlansoprazole (DEXILANT) 60 mg capsule, Take 1 capsule (60 mg total) by mouth once daily., Disp: 90 capsule, Rfl: 3    dextroamphetamine-amphetamine (ADDERALL XR) 30 MG 24 hr capsule, Take 30 mg by mouth every morning. , Disp: , Rfl: 0    dextroamphetamine-amphetamine (ADDERALL)  20 mg tablet, Take 20 mg by mouth once daily. , Disp: , Rfl:     epinastine 0.05 % ophthalmic solution, Place 1 drop into both eyes 2 (two) times daily., Disp: , Rfl: 99    estradiol (ESTRACE) 1 MG tablet, Take 1 mg by mouth once daily., Disp: , Rfl: 11    fluticasone-salmeterol diskus inhaler 100-50 mcg, Inhale 1 puff into the lungs 2 (two) times daily., Disp: , Rfl:     gabapentin (NEURONTIN) 300 MG capsule, Take 300 mg by mouth 2 (two) times daily. , Disp: , Rfl:     hydrocodone-acetaminophen 7.5-325mg (NORCO) 7.5-325 mg per tablet, Take 1-2 tablets by mouth every 4 (four) hours as needed for Pain., Disp: 40 tablet, Rfl: 0    lamotrigine (LAMICTAL) 150 MG Tab, Take 300 mg by mouth every evening. , Disp: , Rfl:     LIDOcaine (LIDODERM) 5 %, Place 1 patch onto the skin once daily. Remove & Discard patch within 12 hours or as directed by MD, Disp: 11 patch, Rfl: 0    multivitamin (THERAGRAN) per tablet, Take 1 tablet by mouth once daily., Disp: , Rfl:     olopatadine (PATADAY) 0.2 % Drop, Place 1 drop into both eyes once daily., Disp: , Rfl:     ondansetron (ZOFRAN-ODT) 4 MG TbDL, Take 1 tablet (4 mg total) by mouth every 6 (six) hours as needed., Disp: 30 tablet, Rfl: 3    pramipexole (MIRAPEX) 0.25 MG tablet, Take 0.25 mg by mouth 2 (two) times daily., Disp: , Rfl:     sertraline (ZOLOFT) 100 MG tablet, Take 150 mg by mouth every evening. , Disp: , Rfl:     simvastatin (ZOCOR) 20 MG tablet, Take 1 tablet (20 mg total) by mouth every evening., Disp: 90 tablet, Rfl: 3    sulindac (CLINORIL) 200 MG Tab, Take 200 mg by mouth 2 (two) times daily. HCS, Disp: , Rfl:     sumatriptan (IMITREX STATDOSE) 6 mg/0.5 mL kit, Inject 6 mg into the skin every 2 (two) hours as needed for Migraine., Disp: , Rfl:     sumatriptan (IMITREX) 100 MG tablet, Take 100 mg by mouth every 2 (two) hours as needed for Migraine., Disp: , Rfl:     tiZANidine (ZANAFLEX) 4 MG tablet, Take 4 mg by mouth 2 (two) times a day. , Disp: , Rfl:      topiramate (TOPAMAX) 25 MG tablet, Take 1 tablet (25 mg total) by mouth 2 (two) times daily., Disp: 60 tablet, Rfl: 0    Review of patient's allergies indicates:   Allergen Reactions    Ace inhibitors Other (See Comments)     Cough    Morphine Other (See Comments)     headache    Compazine [prochlorperazine] Anxiety       Family History   Problem Relation Age of Onset    Diabetes Mother     Hyperlipidemia Mother     Hypertension Mother     Heart disease Father     Hypertension Brother     Breast cancer Sister      Social History     Occupational History    Not on file   Tobacco Use    Smoking status: Never    Smokeless tobacco: Never   Substance and Sexual Activity    Alcohol use: No    Drug use: No    Sexual activity: Not on file

## 2023-06-29 NOTE — PROGRESS NOTES
Subjective:      Patient ID: Bessie Elliott is a 52 y.o. female.    Chief Complaint: Results and Pain of the Right Hip    HPI  Patient is a for follow-up on her right hip.  She states that her pain has progressed she has had an additional fall and an MRI since her last visit.  ROS      Objective:    Ortho Exam     Constitutional:   Patient is alert  and oriented in no acute distress  HEENT:  normocephalic atraumatic; PERRL EOMI  Neck:  Supple without adenopathy  Cardiovascular:  Normal rate and rhythm  Pulmonary:  Normal respiratory effort normal chest wall expansion  Abdominal:  Nonprotuberant nondistended  Musculoskeletal:  She comes in using a walker she is partial weight-bearing  She has mild discomfort with full internal rotation of her right hip  Neurological:  No focal defect; cranial nerves 2-12 grossly intact  Psychiatric/behavioral:  Mood and behavior normal      MRI Hip Without Contrast Right  Narrative: EXAMINATION:  MRI HIP WITHOUT CONTRAST RIGHT    CLINICAL HISTORY:  Fracture, hip;  Fracture of unspecified part of neck of right femur, sequela    TECHNIQUE:  Multiplanar/multisequence MRI of the right hip was performed without the use of intravenous or intra-articular gadolinium.    COMPARISON:  Plain films 05/25/2023.  Report of outside MRI performed on 04/18/2023.  Films not available for direct comparison.  CT 04/09/2023.    FINDINGS:  Bone: There is a late subacute incomplete stress fracture extending from the medial cortex of the intertrochanteric proximal right femur distally into the central medullary canal of the femoral diametaphysis.  There is heterogeneous subacute bone marrow edema of the right femoral neck, intertrochanteric femur and proximal femoral shaft.    Labrum: Grossly intact noting poor sensitivity for labral tear secondary to non arthrographic technique.  Also metallic susceptibility artifact secondary to stimulator device within the left hip.    Cartilage: No large articular  cartilage defect demonstrated on this non arthrographic study.    Tendons: There is mild tendinosis at the origin of the right common hamstring tendon.    Miscellaneous: Limited evaluation of pelvic soft tissues is unremarkable.  Impression: 1. Late subacute right intertrochanteric and proximal shaft stress fracture with associated bone marrow edema.  2. Mild tendinosis at the origin of the right common hamstring.  3. No significant joint effusion.  This report was flagged in Epic as abnormal.    Electronically signed by: Christo Guzman  Date:    06/27/2023  Time:    11:45       My Radiographs Findings:    I have personally reviewed radiographs and concur with above findings    Assessment:       Encounter Diagnosis   Name Primary?    Closed fracture of neck of right femur, initial encounter Yes         Plan:       I have discussed medical condition treatment options with her at length.  After review of her MRI and here clinical history of suggested consideration prophylactic intramedullary nailing of her right hip.  I have discussed indications alternatives and potential complications of the planned procedure including but not limited to bleeding infection damage to neurovascular structures progression of her fractures periprosthetic fractures hardware failure need for further surgery ongoing pain.  Patient expressed understanding agrees to proceed.  There history physical preoperative paperwork was accomplished all questions were answered in layman's terms she could understand stand.  We will get medical anesthetic and insurance preauthorization and clearance meantime, keep her protected weight-bearing.  Follow-up with me approximately 10 days postop for suture or staple removal sooner if any questions or problems.        Past Medical History:   Diagnosis Date    Arthritis     bilateral knees and hands    Asthma     Bipolar 1 disorder     Diabetes mellitus     Hiatal hernia     HLD (hyperlipidemia)     Migraine  headache     Morbid obesity 2016    S/P gastric bypass 2016    by     Shoulder injury     left shoulder rotator cuff repair     Past Surgical History:   Procedure Laterality Date     SECTION      COLONOSCOPY N/A 2019    Procedure: COLONOSCOPY;  Surgeon: Dudley Araiza MD;  Location: [facility] ENDO;  Service: Endoscopy;  Laterality: N/A;    ESOPHAGOGASTRODUODENOSCOPY N/A 2019    Procedure: EGD (ESOPHAGOGASTRODUODENOSCOPY);  Surgeon: Dudley Araiza MD;  Location: [facility] ENDO;  Service: Endoscopy;  Laterality: N/A;    GASTRIC BYPASS  2016        HYSTERECTOMY      INTRALUMINAL GASTROINTESTINAL TRACT IMAGING VIA CAPSULE N/A 2019    Procedure: IMAGING PROCEDURE, GI TRACT, INTRALUMINAL, VIA CAPSULE;  Surgeon: Dudley Araiza MD;  Location: [facility] ENDO;  Service: Endoscopy;  Laterality: N/A;    JOINT REPLACEMENT      bilateral knees    ROTATOR CUFF REPAIR Left 2016    TOTAL KNEE ARTHROPLASTY Bilateral          Current Outpatient Medications:     albuterol (PROVENTIL) 2.5 mg /3 mL (0.083 %) nebulizer solution, Take 2.5 mg by nebulization every 6 (six) hours as needed for Wheezing or Shortness of Breath. , Disp: , Rfl:     ALBUTEROL SULFATE (VENTOLIN HFA INHL), Inhale 1 puff into the lungs once daily. , Disp: , Rfl:     amitriptyline (ELAVIL) 150 MG Tab, Take 150 mg by mouth nightly., Disp: , Rfl:     amoxicillin (AMOXIL) 500 MG capsule, Take 500 mg by mouth every 8 (eight) hours., Disp: , Rfl:     celecoxib (CELEBREX) 100 MG capsule, Take 100 mg by mouth once daily., Disp: , Rfl:     cyanocobalamin 1,000 mcg/mL injection, Inject 1,000 mcg into the muscle every 30 days. , Disp: , Rfl:     dexlansoprazole (DEXILANT) 60 mg capsule, Take 1 capsule (60 mg total) by mouth once daily., Disp: 90 capsule, Rfl: 3    dextroamphetamine-amphetamine (ADDERALL XR) 30 MG 24 hr capsule, Take 30 mg by mouth every morning. , Disp: , Rfl: 0    dextroamphetamine-amphetamine (ADDERALL)  20 mg tablet, Take 20 mg by mouth once daily. , Disp: , Rfl:     epinastine 0.05 % ophthalmic solution, Place 1 drop into both eyes 2 (two) times daily., Disp: , Rfl: 99    estradiol (ESTRACE) 1 MG tablet, Take 1 mg by mouth once daily., Disp: , Rfl: 11    fluticasone-salmeterol diskus inhaler 100-50 mcg, Inhale 1 puff into the lungs 2 (two) times daily., Disp: , Rfl:     gabapentin (NEURONTIN) 300 MG capsule, Take 300 mg by mouth 2 (two) times daily. , Disp: , Rfl:     hydrocodone-acetaminophen 7.5-325mg (NORCO) 7.5-325 mg per tablet, Take 1-2 tablets by mouth every 4 (four) hours as needed for Pain., Disp: 40 tablet, Rfl: 0    lamotrigine (LAMICTAL) 150 MG Tab, Take 300 mg by mouth every evening. , Disp: , Rfl:     LIDOcaine (LIDODERM) 5 %, Place 1 patch onto the skin once daily. Remove & Discard patch within 12 hours or as directed by MD, Disp: 11 patch, Rfl: 0    multivitamin (THERAGRAN) per tablet, Take 1 tablet by mouth once daily., Disp: , Rfl:     olopatadine (PATADAY) 0.2 % Drop, Place 1 drop into both eyes once daily., Disp: , Rfl:     ondansetron (ZOFRAN-ODT) 4 MG TbDL, Take 1 tablet (4 mg total) by mouth every 6 (six) hours as needed., Disp: 30 tablet, Rfl: 3    pramipexole (MIRAPEX) 0.25 MG tablet, Take 0.25 mg by mouth 2 (two) times daily., Disp: , Rfl:     sertraline (ZOLOFT) 100 MG tablet, Take 150 mg by mouth every evening. , Disp: , Rfl:     simvastatin (ZOCOR) 20 MG tablet, Take 1 tablet (20 mg total) by mouth every evening., Disp: 90 tablet, Rfl: 3    sulindac (CLINORIL) 200 MG Tab, Take 200 mg by mouth 2 (two) times daily. HCS, Disp: , Rfl:     sumatriptan (IMITREX STATDOSE) 6 mg/0.5 mL kit, Inject 6 mg into the skin every 2 (two) hours as needed for Migraine., Disp: , Rfl:     sumatriptan (IMITREX) 100 MG tablet, Take 100 mg by mouth every 2 (two) hours as needed for Migraine., Disp: , Rfl:     tiZANidine (ZANAFLEX) 4 MG tablet, Take 4 mg by mouth 2 (two) times a day. , Disp: , Rfl:      topiramate (TOPAMAX) 25 MG tablet, Take 1 tablet (25 mg total) by mouth 2 (two) times daily., Disp: 60 tablet, Rfl: 0    Review of patient's allergies indicates:   Allergen Reactions    Ace inhibitors Other (See Comments)     Cough    Morphine Other (See Comments)     headache    Compazine [prochlorperazine] Anxiety       Family History   Problem Relation Age of Onset    Diabetes Mother     Hyperlipidemia Mother     Hypertension Mother     Heart disease Father     Hypertension Brother     Breast cancer Sister      Social History     Occupational History    Not on file   Tobacco Use    Smoking status: Never    Smokeless tobacco: Never   Substance and Sexual Activity    Alcohol use: No    Drug use: No    Sexual activity: Not on file

## 2023-07-03 ENCOUNTER — PATIENT MESSAGE (OUTPATIENT)
Dept: ORTHOPEDICS | Facility: CLINIC | Age: 53
End: 2023-07-03
Payer: MEDICARE

## 2023-07-03 DIAGNOSIS — S72.001A CLOSED FRACTURE OF NECK OF RIGHT FEMUR, INITIAL ENCOUNTER: Primary | ICD-10-CM

## 2023-07-03 DIAGNOSIS — Z01.818 PREOP TESTING: ICD-10-CM

## 2023-07-05 ENCOUNTER — ANESTHESIA EVENT (OUTPATIENT)
Dept: SURGERY | Facility: HOSPITAL | Age: 53
DRG: 482 | End: 2023-07-05
Payer: MEDICARE

## 2023-07-05 ENCOUNTER — HOSPITAL ENCOUNTER (OUTPATIENT)
Dept: PREADMISSION TESTING | Facility: HOSPITAL | Age: 53
Discharge: HOME | End: 2023-07-05
Attending: ORTHOPAEDIC SURGERY
Payer: MEDICARE

## 2023-07-05 NOTE — ANESTHESIA PREPROCEDURE EVALUATION
07/05/2023  Besise Elliott is a 52 y.o., female.      Pre-op Assessment    I have reviewed the Patient Summary Reports.     I have reviewed the Nursing Notes.    I have reviewed the Medications.     Review of Systems  Social:  Non-Smoker    Hematology/Oncology:  Hematology Normal   Oncology Normal     EENT/Dental:EENT/Dental Normal   Cardiovascular:   hyperlipidemia    Pulmonary:   Asthma    Hepatic/GI:   Hiatal Hernia, GERD    Neurological:   Neuromuscular Disease, Headaches    Psych:   Psychiatric History (Bipolar)          Physical Exam    Airway:  Mallampati: II   Mouth Opening: Normal  TM Distance: Normal  Tongue: Normal  Neck ROM: Normal ROM    Dental:  Dentures    Chest/Lungs:  Clear to auscultation    Heart:  Rate: Normal  Rhythm: Regular Rhythm        Anesthesia Plan  Type of Anesthesia, risks & benefits discussed:    Anesthesia Type: Gen ETT  Intra-op Monitoring Plan: Standard ASA Monitors  Post Op Pain Control Plan: multimodal analgesia  Induction:  IV  Informed Consent: Informed consent signed with the Patient and all parties understand the risks and agree with anesthesia plan.  All questions answered.   ASA Score: 3  Day of Surgery Review of History & Physical: H&P Update referred to the surgeon/provider.    Ready For Surgery From Anesthesia Perspective.     .

## 2023-07-12 ENCOUNTER — TELEPHONE (OUTPATIENT)
Dept: ORTHOPEDICS | Facility: CLINIC | Age: 53
End: 2023-07-12
Payer: MEDICARE

## 2023-07-12 ENCOUNTER — HOSPITAL ENCOUNTER (EMERGENCY)
Facility: HOSPITAL | Age: 53
Discharge: HOME | End: 2023-07-12
Attending: EMERGENCY MEDICINE
Payer: MEDICARE

## 2023-07-12 ENCOUNTER — HOSPITAL ENCOUNTER (INPATIENT)
Facility: HOSPITAL | Age: 53
LOS: 1 days | Discharge: HOME | DRG: 482 | End: 2023-07-12
Attending: ORTHOPAEDIC SURGERY | Admitting: ORTHOPAEDIC SURGERY
Payer: MEDICARE

## 2023-07-12 ENCOUNTER — ANESTHESIA (OUTPATIENT)
Dept: SURGERY | Facility: HOSPITAL | Age: 53
DRG: 482 | End: 2023-07-12
Payer: MEDICARE

## 2023-07-12 VITALS
RESPIRATION RATE: 35 BRPM | OXYGEN SATURATION: 100 % | WEIGHT: 190 LBS | HEART RATE: 76 BPM | TEMPERATURE: 97 F | SYSTOLIC BLOOD PRESSURE: 135 MMHG | DIASTOLIC BLOOD PRESSURE: 82 MMHG | BODY MASS INDEX: 31.65 KG/M2 | HEIGHT: 65 IN

## 2023-07-12 DIAGNOSIS — D64.9 ANEMIA, UNSPECIFIED TYPE: ICD-10-CM

## 2023-07-12 DIAGNOSIS — S72.141G: Primary | ICD-10-CM

## 2023-07-12 DIAGNOSIS — Z51.89 VISIT FOR WOUND CHECK: Primary | ICD-10-CM

## 2023-07-12 DIAGNOSIS — R55 NEAR SYNCOPE: ICD-10-CM

## 2023-07-12 LAB
ABO + RH BLD: NORMAL
ALBUMIN SERPL BCP-MCNC: 3 G/DL (ref 3.5–5.2)
ALP SERPL-CCNC: 100 U/L (ref 55–135)
ALT SERPL W/O P-5'-P-CCNC: 9 U/L (ref 10–44)
ANION GAP SERPL CALC-SCNC: 12 MMOL/L (ref 8–16)
AST SERPL-CCNC: 18 U/L (ref 10–40)
BACTERIA #/AREA URNS HPF: ABNORMAL /HPF
BASOPHILS # BLD AUTO: 0.02 K/UL (ref 0–0.2)
BASOPHILS NFR BLD: 0.1 % (ref 0–1.9)
BILIRUB SERPL-MCNC: 0.3 MG/DL (ref 0.1–1)
BILIRUB UR QL STRIP: NEGATIVE
BLD GP AB SCN CELLS X3 SERPL QL: NORMAL
BUN SERPL-MCNC: 13 MG/DL (ref 6–20)
CALCIUM SERPL-MCNC: 7.7 MG/DL (ref 8.7–10.5)
CHLORIDE SERPL-SCNC: 103 MMOL/L (ref 95–110)
CLARITY UR: CLEAR
CO2 SERPL-SCNC: 19 MMOL/L (ref 23–29)
COLOR UR: YELLOW
CREAT SERPL-MCNC: 0.9 MG/DL (ref 0.5–1.4)
DIFFERENTIAL METHOD: ABNORMAL
EOSINOPHIL # BLD AUTO: 0 K/UL (ref 0–0.5)
EOSINOPHIL NFR BLD: 0 % (ref 0–8)
ERYTHROCYTE [DISTWIDTH] IN BLOOD BY AUTOMATED COUNT: 13.6 % (ref 11.5–14.5)
EST. GFR  (NO RACE VARIABLE): >60 ML/MIN/1.73 M^2
GLUCOSE SERPL-MCNC: 199 MG/DL (ref 70–110)
GLUCOSE UR QL STRIP: NEGATIVE
HCT VFR BLD AUTO: 25.1 % (ref 37–48.5)
HGB BLD-MCNC: 8.4 G/DL (ref 12–16)
HGB UR QL STRIP: ABNORMAL
HYALINE CASTS #/AREA URNS LPF: 10 /LPF
IMM GRANULOCYTES # BLD AUTO: 0.06 K/UL (ref 0–0.04)
IMM GRANULOCYTES NFR BLD AUTO: 0.4 % (ref 0–0.5)
KETONES UR QL STRIP: NEGATIVE
LEUKOCYTE ESTERASE UR QL STRIP: NEGATIVE
LYMPHOCYTES # BLD AUTO: 1.1 K/UL (ref 1–4.8)
LYMPHOCYTES NFR BLD: 8.1 % (ref 18–48)
MAGNESIUM SERPL-MCNC: 1.8 MG/DL (ref 1.6–2.6)
MCH RBC QN AUTO: 30.7 PG (ref 27–31)
MCHC RBC AUTO-ENTMCNC: 33.5 G/DL (ref 32–36)
MCV RBC AUTO: 92 FL (ref 82–98)
MICROSCOPIC COMMENT: ABNORMAL
MONOCYTES # BLD AUTO: 0.8 K/UL (ref 0.3–1)
MONOCYTES NFR BLD: 5.6 % (ref 4–15)
NEUTROPHILS # BLD AUTO: 11.7 K/UL (ref 1.8–7.7)
NEUTROPHILS NFR BLD: 85.8 % (ref 38–73)
NITRITE UR QL STRIP: NEGATIVE
NRBC BLD-RTO: 0 /100 WBC
PH UR STRIP: 6 [PH] (ref 5–8)
PLATELET # BLD AUTO: 254 K/UL (ref 150–450)
PMV BLD AUTO: 10.6 FL (ref 9.2–12.9)
POTASSIUM SERPL-SCNC: 3.7 MMOL/L (ref 3.5–5.1)
PROT SERPL-MCNC: 6 G/DL (ref 6–8.4)
PROT UR QL STRIP: ABNORMAL
RBC # BLD AUTO: 2.74 M/UL (ref 4–5.4)
RBC #/AREA URNS HPF: 15 /HPF (ref 0–4)
SODIUM SERPL-SCNC: 134 MMOL/L (ref 136–145)
SP GR UR STRIP: >=1.03 (ref 1–1.03)
SPECIMEN OUTDATE: NORMAL
SQUAMOUS #/AREA URNS HPF: 5 /HPF
URN SPEC COLLECT METH UR: ABNORMAL
UROBILINOGEN UR STRIP-ACNC: NEGATIVE EU/DL
WBC # BLD AUTO: 13.69 K/UL (ref 3.9–12.7)
WBC #/AREA URNS HPF: 4 /HPF (ref 0–5)

## 2023-07-12 PROCEDURE — 81000 URINALYSIS NONAUTO W/SCOPE: CPT | Performed by: EMERGENCY MEDICINE

## 2023-07-12 PROCEDURE — 36415 COLL VENOUS BLD VENIPUNCTURE: CPT | Performed by: EMERGENCY MEDICINE

## 2023-07-12 PROCEDURE — C1769 GUIDE WIRE: HCPCS | Performed by: ORTHOPAEDIC SURGERY

## 2023-07-12 PROCEDURE — 83735 ASSAY OF MAGNESIUM: CPT | Performed by: EMERGENCY MEDICINE

## 2023-07-12 PROCEDURE — 37000008 HC ANESTHESIA 1ST 15 MINUTES: Performed by: ORTHOPAEDIC SURGERY

## 2023-07-12 PROCEDURE — 97116 GAIT TRAINING THERAPY: CPT

## 2023-07-12 PROCEDURE — 93010 EKG 12-LEAD: ICD-10-PCS | Mod: ,,, | Performed by: INTERNAL MEDICINE

## 2023-07-12 PROCEDURE — 71000039 HC RECOVERY, EACH ADD'L HOUR: Performed by: ORTHOPAEDIC SURGERY

## 2023-07-12 PROCEDURE — 93010 ELECTROCARDIOGRAM REPORT: CPT | Mod: ,,, | Performed by: INTERNAL MEDICINE

## 2023-07-12 PROCEDURE — D9220A PRA ANESTHESIA: Mod: CRNA,,, | Performed by: NURSE ANESTHETIST, CERTIFIED REGISTERED

## 2023-07-12 PROCEDURE — D9220A PRA ANESTHESIA: ICD-10-PCS | Mod: CRNA,,, | Performed by: NURSE ANESTHETIST, CERTIFIED REGISTERED

## 2023-07-12 PROCEDURE — C1713 ANCHOR/SCREW BN/BN,TIS/BN: HCPCS | Performed by: ORTHOPAEDIC SURGERY

## 2023-07-12 PROCEDURE — 12000002 HC ACUTE/MED SURGE SEMI-PRIVATE ROOM

## 2023-07-12 PROCEDURE — 85025 COMPLETE CBC W/AUTO DIFF WBC: CPT | Performed by: EMERGENCY MEDICINE

## 2023-07-12 PROCEDURE — 80053 COMPREHEN METABOLIC PANEL: CPT | Performed by: EMERGENCY MEDICINE

## 2023-07-12 PROCEDURE — 63600175 PHARM REV CODE 636 W HCPCS: Performed by: NURSE ANESTHETIST, CERTIFIED REGISTERED

## 2023-07-12 PROCEDURE — D9220A PRA ANESTHESIA: Mod: ANES,,, | Performed by: ANESTHESIOLOGY

## 2023-07-12 PROCEDURE — 27245 TREAT THIGH FRACTURE: CPT | Mod: 79,LT,, | Performed by: ORTHOPAEDIC SURGERY

## 2023-07-12 PROCEDURE — 63600175 PHARM REV CODE 636 W HCPCS: Performed by: ANESTHESIOLOGY

## 2023-07-12 PROCEDURE — 36000710: Performed by: ORTHOPAEDIC SURGERY

## 2023-07-12 PROCEDURE — 71000015 HC POSTOP RECOV 1ST HR: Performed by: ORTHOPAEDIC SURGERY

## 2023-07-12 PROCEDURE — 27201423 OPTIME MED/SURG SUP & DEVICES STERILE SUPPLY: Performed by: ORTHOPAEDIC SURGERY

## 2023-07-12 PROCEDURE — 36000711: Performed by: ORTHOPAEDIC SURGERY

## 2023-07-12 PROCEDURE — 93005 ELECTROCARDIOGRAM TRACING: CPT

## 2023-07-12 PROCEDURE — D9220A PRA ANESTHESIA: ICD-10-PCS | Mod: ANES,,, | Performed by: ANESTHESIOLOGY

## 2023-07-12 PROCEDURE — 86900 BLOOD TYPING SEROLOGIC ABO: CPT | Performed by: EMERGENCY MEDICINE

## 2023-07-12 PROCEDURE — 97162 PT EVAL MOD COMPLEX 30 MIN: CPT

## 2023-07-12 PROCEDURE — 25000003 PHARM REV CODE 250: Performed by: NURSE ANESTHETIST, CERTIFIED REGISTERED

## 2023-07-12 PROCEDURE — 27245 PR OPEN FIX INTER/SUBTROCH FX,IMPLNT: ICD-10-PCS | Mod: 79,LT,, | Performed by: ORTHOPAEDIC SURGERY

## 2023-07-12 PROCEDURE — 37000009 HC ANESTHESIA EA ADD 15 MINS: Performed by: ORTHOPAEDIC SURGERY

## 2023-07-12 PROCEDURE — 99284 EMERGENCY DEPT VISIT MOD MDM: CPT | Mod: 25

## 2023-07-12 PROCEDURE — 25000003 PHARM REV CODE 250: Performed by: EMERGENCY MEDICINE

## 2023-07-12 PROCEDURE — 71000033 HC RECOVERY, INTIAL HOUR: Performed by: ORTHOPAEDIC SURGERY

## 2023-07-12 DEVICE — BLADE HELICAL PERF GOLD 90MM: Type: IMPLANTABLE DEVICE | Site: FEMUR | Status: FUNCTIONAL

## 2023-07-12 DEVICE — NAIL IM CANN 130 DEG 11X170: Type: IMPLANTABLE DEVICE | Site: FEMUR | Status: FUNCTIONAL

## 2023-07-12 DEVICE — SCREW LOCK T25 5.0X38MM: Type: IMPLANTABLE DEVICE | Site: FEMUR | Status: FUNCTIONAL

## 2023-07-12 RX ORDER — LIDOCAINE HYDROCHLORIDE 10 MG/ML
1 INJECTION, SOLUTION EPIDURAL; INFILTRATION; INTRACAUDAL; PERINEURAL ONCE
Status: DISCONTINUED | OUTPATIENT
Start: 2023-07-12 | End: 2023-07-14 | Stop reason: HOSPADM

## 2023-07-12 RX ORDER — LIDOCAINE HYDROCHLORIDE 20 MG/ML
INJECTION, SOLUTION EPIDURAL; INFILTRATION; INTRACAUDAL; PERINEURAL
Status: DISCONTINUED | OUTPATIENT
Start: 2023-07-12 | End: 2023-07-12

## 2023-07-12 RX ORDER — CEFAZOLIN SODIUM 2 G/50ML
SOLUTION INTRAVENOUS
Status: DISCONTINUED
Start: 2023-07-12 | End: 2023-07-12 | Stop reason: HOSPADM

## 2023-07-12 RX ORDER — HYDROCODONE BITARTRATE AND ACETAMINOPHEN 7.5; 325 MG/1; MG/1
1-2 TABLET ORAL EVERY 4 HOURS PRN
Qty: 20 TABLET | Refills: 0 | Status: SHIPPED | OUTPATIENT
Start: 2023-07-12

## 2023-07-12 RX ORDER — KETOROLAC TROMETHAMINE 30 MG/ML
15 INJECTION, SOLUTION INTRAMUSCULAR; INTRAVENOUS ONCE
Status: COMPLETED | OUTPATIENT
Start: 2023-07-12 | End: 2023-07-12

## 2023-07-12 RX ORDER — HYDROCODONE BITARTRATE AND ACETAMINOPHEN 5; 325 MG/1; MG/1
1 TABLET ORAL EVERY 4 HOURS PRN
Status: CANCELLED | OUTPATIENT
Start: 2023-07-12

## 2023-07-12 RX ORDER — MIDAZOLAM HYDROCHLORIDE 1 MG/ML
INJECTION INTRAMUSCULAR; INTRAVENOUS
Status: DISCONTINUED | OUTPATIENT
Start: 2023-07-12 | End: 2023-07-12

## 2023-07-12 RX ORDER — ENOXAPARIN SODIUM 100 MG/ML
40 INJECTION SUBCUTANEOUS EVERY 24 HOURS
Status: CANCELLED | OUTPATIENT
Start: 2023-07-12

## 2023-07-12 RX ORDER — ACETAMINOPHEN 650 MG/1
650 SUPPOSITORY RECTAL EVERY 4 HOURS PRN
Status: CANCELLED | OUTPATIENT
Start: 2023-07-12

## 2023-07-12 RX ORDER — HYDROMORPHONE HYDROCHLORIDE 1 MG/ML
1 INJECTION, SOLUTION INTRAMUSCULAR; INTRAVENOUS; SUBCUTANEOUS ONCE
Status: COMPLETED | OUTPATIENT
Start: 2023-07-12 | End: 2023-07-12

## 2023-07-12 RX ORDER — ROCURONIUM BROMIDE 10 MG/ML
INJECTION, SOLUTION INTRAVENOUS
Status: DISCONTINUED | OUTPATIENT
Start: 2023-07-12 | End: 2023-07-12

## 2023-07-12 RX ORDER — CEFAZOLIN SODIUM 1 G/3ML
INJECTION, POWDER, FOR SOLUTION INTRAMUSCULAR; INTRAVENOUS
Status: DISCONTINUED | OUTPATIENT
Start: 2023-07-12 | End: 2023-07-12

## 2023-07-12 RX ORDER — ONDANSETRON 2 MG/ML
4 INJECTION INTRAMUSCULAR; INTRAVENOUS DAILY PRN
Status: DISCONTINUED | OUTPATIENT
Start: 2023-07-12 | End: 2023-07-14 | Stop reason: HOSPADM

## 2023-07-12 RX ORDER — FENTANYL CITRATE 50 UG/ML
INJECTION, SOLUTION INTRAMUSCULAR; INTRAVENOUS
Status: DISCONTINUED | OUTPATIENT
Start: 2023-07-12 | End: 2023-07-12

## 2023-07-12 RX ORDER — MUPIROCIN 20 MG/G
OINTMENT TOPICAL 2 TIMES DAILY
Status: CANCELLED | OUTPATIENT
Start: 2023-07-12 | End: 2023-07-17

## 2023-07-12 RX ORDER — ACETAMINOPHEN 10 MG/ML
INJECTION, SOLUTION INTRAVENOUS
Status: DISCONTINUED | OUTPATIENT
Start: 2023-07-12 | End: 2023-07-12

## 2023-07-12 RX ORDER — ONDANSETRON 2 MG/ML
INJECTION INTRAMUSCULAR; INTRAVENOUS
Status: DISCONTINUED | OUTPATIENT
Start: 2023-07-12 | End: 2023-07-12

## 2023-07-12 RX ORDER — OXYCODONE AND ACETAMINOPHEN 5; 325 MG/1; MG/1
1 TABLET ORAL
Status: DISCONTINUED | OUTPATIENT
Start: 2023-07-12 | End: 2023-07-14 | Stop reason: HOSPADM

## 2023-07-12 RX ORDER — HYDROCODONE BITARTRATE AND ACETAMINOPHEN 10; 325 MG/1; MG/1
1 TABLET ORAL
Status: COMPLETED | OUTPATIENT
Start: 2023-07-12 | End: 2023-07-12

## 2023-07-12 RX ORDER — SODIUM CHLORIDE, SODIUM LACTATE, POTASSIUM CHLORIDE, CALCIUM CHLORIDE 600; 310; 30; 20 MG/100ML; MG/100ML; MG/100ML; MG/100ML
125 INJECTION, SOLUTION INTRAVENOUS CONTINUOUS
Status: DISCONTINUED | OUTPATIENT
Start: 2023-07-12 | End: 2023-07-14 | Stop reason: HOSPADM

## 2023-07-12 RX ORDER — SODIUM CHLORIDE 9 MG/ML
INJECTION, SOLUTION INTRAVENOUS CONTINUOUS
Status: CANCELLED | OUTPATIENT
Start: 2023-07-12

## 2023-07-12 RX ORDER — DEXAMETHASONE SODIUM PHOSPHATE 4 MG/ML
INJECTION, SOLUTION INTRA-ARTICULAR; INTRALESIONAL; INTRAMUSCULAR; INTRAVENOUS; SOFT TISSUE
Status: DISCONTINUED | OUTPATIENT
Start: 2023-07-12 | End: 2023-07-12

## 2023-07-12 RX ORDER — HYDROMORPHONE HYDROCHLORIDE 1 MG/ML
2 INJECTION, SOLUTION INTRAMUSCULAR; INTRAVENOUS; SUBCUTANEOUS ONCE
Status: COMPLETED | OUTPATIENT
Start: 2023-07-12 | End: 2023-07-12

## 2023-07-12 RX ORDER — SODIUM CHLORIDE, SODIUM LACTATE, POTASSIUM CHLORIDE, CALCIUM CHLORIDE 600; 310; 30; 20 MG/100ML; MG/100ML; MG/100ML; MG/100ML
INJECTION, SOLUTION INTRAVENOUS CONTINUOUS
Status: DISCONTINUED | OUTPATIENT
Start: 2023-07-12 | End: 2023-07-14 | Stop reason: HOSPADM

## 2023-07-12 RX ORDER — PROPOFOL 10 MG/ML
VIAL (ML) INTRAVENOUS
Status: DISCONTINUED | OUTPATIENT
Start: 2023-07-12 | End: 2023-07-12

## 2023-07-12 RX ORDER — CEFAZOLIN SODIUM 2 G/50ML
2 SOLUTION INTRAVENOUS
Status: CANCELLED | OUTPATIENT
Start: 2023-07-12 | End: 2023-07-13

## 2023-07-12 RX ADMIN — HYDROMORPHONE HYDROCHLORIDE 1 MG: 1 INJECTION, SOLUTION INTRAMUSCULAR; INTRAVENOUS; SUBCUTANEOUS at 11:07

## 2023-07-12 RX ADMIN — ROCURONIUM BROMIDE 30 MG: 10 INJECTION, SOLUTION INTRAVENOUS at 10:07

## 2023-07-12 RX ADMIN — SODIUM CHLORIDE, SODIUM LACTATE, POTASSIUM CHLORIDE, AND CALCIUM CHLORIDE 125 ML/HR: .6; .31; .03; .02 INJECTION, SOLUTION INTRAVENOUS at 11:07

## 2023-07-12 RX ADMIN — KETOROLAC TROMETHAMINE 15 MG: 30 INJECTION, SOLUTION INTRAMUSCULAR at 11:07

## 2023-07-12 RX ADMIN — ROCURONIUM BROMIDE 20 MG: 10 INJECTION, SOLUTION INTRAVENOUS at 10:07

## 2023-07-12 RX ADMIN — ONDANSETRON 4 MG: 2 INJECTION INTRAMUSCULAR; INTRAVENOUS at 10:07

## 2023-07-12 RX ADMIN — ONDANSETRON 4 MG: 2 INJECTION INTRAMUSCULAR; INTRAVENOUS at 11:07

## 2023-07-12 RX ADMIN — ACETAMINOPHEN 1000 MG: 10 INJECTION, SOLUTION INTRAVENOUS at 10:07

## 2023-07-12 RX ADMIN — SODIUM CHLORIDE 1000 ML: 9 INJECTION, SOLUTION INTRAVENOUS at 08:07

## 2023-07-12 RX ADMIN — FENTANYL CITRATE 100 MCG: 50 INJECTION, SOLUTION INTRAMUSCULAR; INTRAVENOUS at 10:07

## 2023-07-12 RX ADMIN — LIDOCAINE HYDROCHLORIDE 100 MG: 20 INJECTION, SOLUTION EPIDURAL; INFILTRATION; INTRACAUDAL; PERINEURAL at 10:07

## 2023-07-12 RX ADMIN — SODIUM CHLORIDE, POTASSIUM CHLORIDE, SODIUM LACTATE AND CALCIUM CHLORIDE: 600; 310; 30; 20 INJECTION, SOLUTION INTRAVENOUS at 09:07

## 2023-07-12 RX ADMIN — HYDROMORPHONE HYDROCHLORIDE 2 MG: 1 INJECTION, SOLUTION INTRAMUSCULAR; INTRAVENOUS; SUBCUTANEOUS at 12:07

## 2023-07-12 RX ADMIN — PROPOFOL 200 MG: 10 INJECTION, EMULSION INTRAVENOUS at 10:07

## 2023-07-12 RX ADMIN — HYDROCODONE BITARTRATE AND ACETAMINOPHEN 1 TABLET: 10; 325 TABLET ORAL at 08:07

## 2023-07-12 RX ADMIN — CEFAZOLIN 2 G: 330 INJECTION, POWDER, FOR SOLUTION INTRAMUSCULAR; INTRAVENOUS at 10:07

## 2023-07-12 RX ADMIN — DEXAMETHASONE SODIUM PHOSPHATE 4 MG: 4 INJECTION, SOLUTION INTRAMUSCULAR; INTRAVENOUS at 10:07

## 2023-07-12 RX ADMIN — MIDAZOLAM HYDROCHLORIDE 2 MG: 1 INJECTION, SOLUTION INTRAMUSCULAR; INTRAVENOUS at 09:07

## 2023-07-12 NOTE — OP NOTE
[facility]  Orthopedic Surgery  Operative Note    SUMMARY     Date of Procedure: 7/12/2023     Procedure: Procedure(s) (LRB):  INSERTION, INTRAMEDULLARY TITI, FEMUR (Right)       Surgeon(s) and Role:     * Andre Kennedy MD - Primary    1st Assist: Asa LexaSMA veronica    Pre-Operative Diagnosis: Closed fracture of neck of right femur, initial encounter [S72.001A]  Preop testing [Z01.818]    Post-Operative Diagnosis: Post-Op Diagnosis Codes:     * Closed fracture of neck of right femur, initial encounter [S72.001A]     * Preop testing [Z01.818]    Anesthesia: General    Description of the Findings of the Procedure:  See below    Complications: No    Estimated Blood Loss (EBL): * No values recorded between 7/12/2023 10:30 AM and 7/12/2023 11:26 AM *           Implants:   Implant Name Type Inv. Item Serial No.  Lot No. LRB No. Used Action   BLADE HELICAL PERF GOLD 90MM - [id number]  BLADE HELICAL PERF GOLD 90MM  [employer]  Right 1 Implanted   SCREW LOCK T25 5.0X38MM - [id number]  SCREW LOCK T25 5.0X38MM  [employer]  Right 1 Implanted   NAIL IM LINDA 130 DEG [id number] - [id number]  NAIL IM LINDA 130 DEG [id number]  [employer]  Right 1 Implanted       Specimens:   Specimen (24h ago, onward)      None                    Condition: Good    Disposition: PACU - hemodynamically stable.    Attestation: I was present and scrubbed for the entire procedure.    INDICATIONS FOR THE PROCEDURE: A 52 year-old patient with a history of a presumptive right proximal femoral stress fracture The patient was medically cleared and after discussion with the family proceeded with the procedure above.    PROCEDURE IN DETAIL: Risks, benefits and alternatives of the procedure were   explained to the patient and family including, but not limited to damage to   nerves, arteries, blood vessels. Also explained the risk of nonunion, malunion,  hardware prominence, hardware failure, cut out as well as infection, DVT, PE as  well as anesthetic  complications including seizure, stroke, heart attack and   death. They understood this and signed informed consent. The patient's right   hip was marked prior to coming to the Operating Room. Once a formal timeout was  done in which correct patient, procedure and op site were all correctly   identified and confirmed by the entire operating team. Then, antibiotics were given prior to surgical incision.  General anesthesia was induced. The patient's  right hip and lower lower extremity was prepped and draped in normal   sterile fashion. Fluoro was brought in to make sure that we could adequately   reduce the fracture closed which we could with traction on the fracture table.   A 3 cm incision was made proximal to the tip of the greater trochanter onto skin  in the fascia jany. A guidewire was inserted right into the tip of the greater  trochanter this was over-drilled  this was done, the nail was then inserted all the way down until the lag screw would go into the center of the head.THe lag guide was placed and skin was incised and then the guidewire was inserted into the center of the   femoral head on the AP and lateral view. We then   drilled for a helical blade and then placed  in acceptable position.   After this was done, we then placed the locking set screw up top, and then   removed the guide. I placed a single distal locking screw through the targeter and it was bicortical.  After this was done, all guides were taken out. Fluoro shots were taken at   the hip in both AP and lateral planes confirming appropriate lag screw position   and 1 distally to confirm bicortical locking screw fixation. After this was   done, we proceeded with closing.  Wounds were irrigated with normal   saline. Deep tissue was closed using 0 Vicryl, subcutaneous tissue was closed   using 2-0 Vicryl and then skin staples. Sterile dressing was applied. They were  extubated, awakened, and was transferred from the Operating Room to the  Recovery  Room in stable condition.

## 2023-07-12 NOTE — PT/OT/SLP EVAL
Physical Therapy Evaluation and Discharge Note    Patient Name:  Bessie Elliott   MRN:  [id number]    Recommendations:     Discharge Recommendations:  home with family  Discharge Equipment Recommendations:   RW  Barriers to discharge: None    Assessment:     Bessie Elliott is a 52 y.o. female admitted with a medical diagnosis of <principal problem not specified>. .  At this time, patient is discharging home following surgery and does not require further acute PT services.     Recent Surgery: Procedure(s) (LRB):  INSERTION, INTRAMEDULLARY TITI, FEMUR (Right) Day of Surgery    Plan:     During this hospitalization, patient does not require further acute PT services.  Please re-consult if situation changes.      Subjective     Chief Complaint: s/p right hip IMR  Patient/Family Comments/goals: discharge home with family  Pain/Comfort:  Pain Rating 1:  (3/10 right hip)    Patients cultural, spiritual, Restorationist conflicts given the current situation: no    Living Environment:  Patient lives with her family in a single story home with 6 steps and 1 handrail to enter.  Prior to admission, patients level of function was independent.  Equipment used at home:  rollator.  DME owned (not currently used): none.  Upon discharge, patient will have assistance from family.    Objective:     Communicated with RN prior to session.  Patient found HOB elevated with peripheral IV, telemetry, continuous pulse ox, and sister at bedside upon PT entry to unit.    General Precautions: Standard,      Orthopedic Precautions:  PWB RLE (per RN in discharge orders)  Braces:  N/A  Respiratory Status: Room air    Exams:  Cognitive Exam:  Patient is oriented to Person, Place, Time, and Situation  RLE ROM: WFL except hip  RLE Strength: limited per surgical procedure performed today  LLE ROM: WFL  LLE Strength: WFL    Functional Mobility:  Bed Mobility:  Supine to Sit: minimum assistance  Sit to Supine: activity did not occur as patient left  "sitting up in wheelchair for discharge at completion of session   Transfers:  Sit to Stand:  contact guard assistance with rollator with brakes locked  Bed to Chair: contact guard assistance with  rollator (brakes in locked position)  using  Step Transfer  Gait: patient ambulated 25' with CGA using rollator    AM-PAC 6 CLICK MOBILITY  Total Score:      Treatment and Education:  Patient seen in PACU for gait training prior to discharge. She is s/p right hip IMR with PWB orders per Dr Kennedy. She was instructed in transfer and gait training using her rollator brought from home, PWB RLE. Patient was advised by PT use of RW or SW would be a better safer choice for her postoperatively secondary to PWB status. Patient verbalizing understanding but will only use rollator because "it does not scratch her floors". PT reinforced importance of compliance with WBS at this time, patient and sister verbalized understanding.     AM-PAC 6 CLICK MOBILITY  Total Score:      Patient left  up in wheelchair  with  RN and sister present and RLE elevated on leg rest .    GOALS:   N/A, patient discharging today    History:     Past Medical History:   Diagnosis Date    Arthritis     bilateral knees and hands    Asthma     Bipolar 1 disorder     Hiatal hernia     HLD (hyperlipidemia)     Migraine headache     Morbid obesity 2016    S/P gastric bypass 2016    by     Shoulder injury     left shoulder rotator cuff repair       Past Surgical History:   Procedure Laterality Date     SECTION      COLONOSCOPY N/A 2019    Procedure: COLONOSCOPY;  Surgeon: Dudley Araiza MD;  Location: [facility];  Service: Endoscopy;  Laterality: N/A;    ESOPHAGOGASTRODUODENOSCOPY N/A 2019    Procedure: EGD (ESOPHAGOGASTRODUODENOSCOPY);  Surgeon: Dudley Araiza MD;  Location: [facility];  Service: Endoscopy;  Laterality: N/A;    GASTRIC BYPASS  2016        HYSTERECTOMY      INTRALUMINAL GASTROINTESTINAL TRACT " IMAGING VIA CAPSULE N/A 1/24/2019    Procedure: IMAGING PROCEDURE, GI TRACT, INTRALUMINAL, VIA CAPSULE;  Surgeon: Dudley Araiza MD;  Location: [facility];  Service: Endoscopy;  Laterality: N/A;    JOINT REPLACEMENT      bilateral knees    ROTATOR CUFF REPAIR Left 2016    TOTAL KNEE ARTHROPLASTY Bilateral 2015       Time Tracking:     PT Received On: 07/12/23  PT Start Time: 1300     PT Stop Time: 1330  PT Total Time (min): 30 min     Billable Minutes: Evaluation 10 min and Gait Training 15 min      07/12/2023

## 2023-07-12 NOTE — ANESTHESIA POSTPROCEDURE EVALUATION
Anesthesia Post Evaluation    Patient: Bessie Elliott    Procedure(s) Performed: Procedure(s) (LRB):  INSERTION, INTRAMEDULLARY TITI, FEMUR (Right)    Final Anesthesia Type: general      Patient location during evaluation: PACU  Patient participation: Yes- Able to Participate  Level of consciousness: awake and awake and alert  Post-procedure vital signs: reviewed and stable  Pain management: adequate  Airway patency: patent    PONV status at discharge: No PONV  Anesthetic complications: no      Cardiovascular status: blood pressure returned to baseline  Respiratory status: unassisted and spontaneous ventilation  Hydration status: euvolemic  Follow-up not needed.          Vitals Value Taken Time   /84 07/12/23 1303   Temp 36 07/12/23 1426   Pulse 73 07/12/23 1308   Resp 30 07/12/23 1308   SpO2 100 % 07/12/23 1306   Vitals shown include unvalidated device data.      Event Time   Out of Recovery 13:20:00         Pain/Gerald Score: Pain Rating Prior to Med Admin: 9 (7/12/2023 12:46 PM)  Pain Rating Post Med Admin: 3 (7/12/2023 12:50 PM)  Gerald Score: 10 (7/12/2023 12:50 PM)

## 2023-07-12 NOTE — TRANSFER OF CARE
"Anesthesia Transfer of Care Note    Patient: Bessie Elliott    Procedure(s) Performed: Procedure(s) (LRB):  INSERTION, INTRAMEDULLARY TITI, FEMUR (Right)    Patient location: PACU    Anesthesia Type: general    Transport from OR: Transported from OR on room air with adequate spontaneous ventilation    Post pain: adequate analgesia    Post assessment: no apparent anesthetic complications    Post vital signs: stable    Level of consciousness: sedated and responds to stimulation    Nausea/Vomiting: no nausea/vomiting    Complications: none    Transfer of care protocol was followed      Last vitals:   Visit Vitals  /78 (BP Location: Right arm, Patient Position: Lying)   Pulse 64   Temp 36.6 °C (97.9 °F) (Oral)   Resp 18   Ht 5' 5" (1.651 m)   Wt 86.2 kg (190 lb)   SpO2 100%   Breastfeeding No   BMI 31.62 kg/m²     "

## 2023-07-13 VITALS
HEIGHT: 65 IN | SYSTOLIC BLOOD PRESSURE: 110 MMHG | OXYGEN SATURATION: 100 % | HEART RATE: 93 BPM | BODY MASS INDEX: 29.99 KG/M2 | RESPIRATION RATE: 18 BRPM | DIASTOLIC BLOOD PRESSURE: 69 MMHG | WEIGHT: 180 LBS | TEMPERATURE: 98 F

## 2023-07-13 NOTE — ED PROVIDER NOTES
Encounter Date: 2023       History     Chief Complaint   Patient presents with    Post-op Problem     Pt here with weakness, dizziness and bleeding from her surgical wounds on her right hip onset today. Pt says she had hip surgery here today and has been laying in her bed all day bc she feels like she will pass out when she stands and her leg hurts. No LOC. No CP/SOB.     The history is provided by the patient.   Review of patient's allergies indicates:   Allergen Reactions    Ace inhibitors Other (See Comments)     Cough    Morphine Other (See Comments)     headache    Compazine [prochlorperazine] Anxiety     Past Medical History:   Diagnosis Date    Arthritis     bilateral knees and hands    Asthma     Bipolar 1 disorder     Hiatal hernia     HLD (hyperlipidemia)     Migraine headache     Morbid obesity 2016    S/P gastric bypass 2016    by     Shoulder injury     left shoulder rotator cuff repair     Past Surgical History:   Procedure Laterality Date     SECTION      COLONOSCOPY N/A 2019    Procedure: COLONOSCOPY;  Surgeon: Dudley Araiza MD;  Location: [facility] ENDO;  Service: Endoscopy;  Laterality: N/A;    ESOPHAGOGASTRODUODENOSCOPY N/A 2019    Procedure: EGD (ESOPHAGOGASTRODUODENOSCOPY);  Surgeon: Dudley Araiza MD;  Location: [facility] ENDO;  Service: Endoscopy;  Laterality: N/A;    GASTRIC BYPASS  2016        HYSTERECTOMY      INTRALUMINAL GASTROINTESTINAL TRACT IMAGING VIA CAPSULE N/A 2019    Procedure: IMAGING PROCEDURE, GI TRACT, INTRALUMINAL, VIA CAPSULE;  Surgeon: Dudley Araiza MD;  Location: [facility] ENDO;  Service: Endoscopy;  Laterality: N/A;    JOINT REPLACEMENT      bilateral knees    ROTATOR CUFF REPAIR Left 2016    TOTAL KNEE ARTHROPLASTY Bilateral      Family History   Problem Relation Age of Onset    Diabetes Mother     Hyperlipidemia Mother     Hypertension Mother     Heart disease Father     Hypertension Brother     Breast  cancer Sister      Social History     Tobacco Use    Smoking status: Never    Smokeless tobacco: Never   Substance Use Topics    Alcohol use: No    Drug use: No     Review of Systems   Constitutional:  Positive for fatigue.   Neurological:  Positive for dizziness and light-headedness.   All other systems reviewed and are negative.    Physical Exam     Initial Vitals [07/12/23 1953]   BP Pulse Resp Temp SpO2   102/60 100 18 98.7 °F (37.1 °C) 98 %      MAP       --         Physical Exam    Nursing note and vitals reviewed.  Constitutional: She appears well-developed and well-nourished. She is not diaphoretic. No distress.   Mildly ill appearing. Nontoxic.   HENT:   Head: Normocephalic and atraumatic.   Op clear, MM slightly dry but pink   Eyes: Conjunctivae and EOM are normal. No scleral icterus.   Neck: Neck supple.   Normal range of motion.  Cardiovascular:  Regular rhythm, normal heart sounds and intact distal pulses.           tachy   Pulmonary/Chest: Breath sounds normal. She exhibits no tenderness.   Abdominal: Abdomen is soft. She exhibits no distension and no mass. There is no abdominal tenderness. There is no rebound and no guarding.   Musculoskeletal:         General: Normal range of motion.      Cervical back: Normal range of motion and neck supple.      Comments: Dressing right thigh with serosanguinous fluid. These were removed and wounds intact. No deformities. Distal pulses intact.      Neurological: She is alert and oriented to person, place, and time. She has normal strength. No sensory deficit. GCS score is 15. GCS eye subscore is 4. GCS verbal subscore is 5. GCS motor subscore is 6.   Skin: Skin is warm and dry. Capillary refill takes less than 2 seconds. No rash noted. No erythema. No pallor.   Psychiatric: She has a normal mood and affect.       ED Course   Procedures  Labs Reviewed   CBC W/ AUTO DIFFERENTIAL - Abnormal; Notable for the following components:       Result Value    WBC 13.69 (*)      RBC 2.74 (*)     Hemoglobin 8.4 (*)     Hematocrit 25.1 (*)     Gran # (ANC) 11.7 (*)     Immature Grans (Abs) 0.06 (*)     Gran % 85.8 (*)     Lymph % 8.1 (*)     All other components within normal limits   COMPREHENSIVE METABOLIC PANEL - Abnormal; Notable for the following components:    Sodium 134 (*)     CO2 19 (*)     Glucose 199 (*)     Calcium 7.7 (*)     Albumin 3.0 (*)     ALT 9 (*)     All other components within normal limits   URINALYSIS, REFLEX TO URINE CULTURE - Abnormal; Notable for the following components:    Specific Gravity, UA >=1.030 (*)     Protein, UA Trace (*)     Occult Blood UA 2+ (*)     All other components within normal limits    Narrative:     Preferred Collection Type->Urine, Clean Catch  Specimen Source->Urine   URINALYSIS MICROSCOPIC - Abnormal; Notable for the following components:    RBC, UA 15 (*)     Bacteria Few (*)     Hyaline Casts, UA 10 (*)     All other components within normal limits    Narrative:     Preferred Collection Type->Urine, Clean Catch  Specimen Source->Urine   MAGNESIUM   TYPE & SCREEN     EKG Readings: (Independently Interpreted)   Rhythm: Normal Sinus Rhythm. Heart Rate: 91. Ectopy: No Ectopy. Conduction: Normal. ST Segments: Normal ST Segments. T Waves: Normal. Axis: Normal. Clinical Impression: Normal Sinus Rhythm     Imaging Results    None          Medications   sodium chloride 0.9% bolus 1,000 mL 1,000 mL (0 mLs Intravenous Stopped 7/12/23 2104)   HYDROcodone-acetaminophen  mg per tablet 1 tablet (1 tablet Oral Given 7/12/23 2045)     Medical Decision Making:   Differential Diagnosis:   Postop hemorrhage, infection, dehiscence  Clinical Tests:   Lab Tests: Ordered and Reviewed  Medical Tests: Ordered and Reviewed  ED Management:  Pt presented with bleeding from her surgical incision on her right thigh. She is POD0 s/p IM nailing right hip fx. D/w Dr Kennedy and he says this bleeding not unexpected. Dressings replaced. Pt was mildly orthostatic and  was given 1L NS. Norco for pain. CBC with H/H 8/24. CMP, UA and Mg unremarkable. EKG normal. Pt to continue her postop directions and she was given return precautions for today.                         Clinical Impression:   Final diagnoses:  [R55] Near syncope  [Z51.89] Visit for wound check (Primary)  [D64.9] Anemia, unspecified type        ED Disposition Condition    Discharge Stable          ED Prescriptions    None       Follow-up Information       Follow up With Specialties Details Why Contact Info    Dru Bee MD Internal Medicine Schedule an appointment as soon as possible for a visit   [address] [place] [address] [phone]      Andre Kennedy MD Orthopedic Surgery Schedule an appointment as soon as possible for a visit   [address] [facility] [place] [address] [phone]               Raimundo Busby Jr., MD  07/12/23 6836

## 2023-07-24 ENCOUNTER — OFFICE VISIT (OUTPATIENT)
Dept: ORTHOPEDICS | Facility: CLINIC | Age: 53
End: 2023-07-24
Payer: MEDICARE

## 2023-07-24 DIAGNOSIS — S72.001D CLOSED FRACTURE OF NECK OF RIGHT FEMUR WITH ROUTINE HEALING, SUBSEQUENT ENCOUNTER: Primary | ICD-10-CM

## 2023-07-24 PROCEDURE — 1159F MED LIST DOCD IN RCRD: CPT | Mod: CPTII,S$GLB,, | Performed by: ORTHOPAEDIC SURGERY

## 2023-07-24 PROCEDURE — 99024 POSTOP FOLLOW-UP VISIT: CPT | Mod: S$GLB,,, | Performed by: ORTHOPAEDIC SURGERY

## 2023-07-24 PROCEDURE — 99999 PR PBB SHADOW E&M-EST. PATIENT-LVL III: CPT | Mod: PBBFAC,,, | Performed by: ORTHOPAEDIC SURGERY

## 2023-07-24 PROCEDURE — 99999 PR PBB SHADOW E&M-EST. PATIENT-LVL III: ICD-10-PCS | Mod: PBBFAC,,, | Performed by: ORTHOPAEDIC SURGERY

## 2023-07-24 PROCEDURE — 99024 PR POST-OP FOLLOW-UP VISIT: ICD-10-PCS | Mod: S$GLB,,, | Performed by: ORTHOPAEDIC SURGERY

## 2023-07-24 PROCEDURE — 1159F PR MEDICATION LIST DOCUMENTED IN MEDICAL RECORD: ICD-10-PCS | Mod: CPTII,S$GLB,, | Performed by: ORTHOPAEDIC SURGERY

## 2023-07-24 NOTE — PROGRESS NOTES
Subjective:      Patient ID: Bessie Elliott is a 52 y.o. female.    Chief Complaint: Pain, Post-op Evaluation, and Swelling of the Right Femur    HPI  Patient follow-up status post intramedullary nail for a right proximal femur fracture pain is improving.  She did have this see the emerged department secondary to postoperative bleeding  ROS      Objective:    Ortho Exam     Proximal incision is erythematous and quite inflamed distal incision is healing nicely  It appears that she has a large hematoma and some resolving ecchymosis.    FL Flouro Usage  Narrative: EXAMINATION:  FL FLOURO USAGE    CLINICAL HISTORY:  IM herlinda;    TECHNIQUE:  Fluoroscopy was provided in surgery, and 2 digital spot views of the right hip were obtained.    Fluoroscopic time: 40.7 seconds    COMPARISON:  05/25/2023    FINDINGS:  Fluoroscopy was provided in surgery and two views of the right hip were obtained demonstrating ORIF of a right hip fracture with placement of a lag screw in the femoral head/neck and interconnecting intramedullary herlinda in the proximal femur, locked distally with a cannulated screw.  Fracture fragment alignment is anatomic.  Impression: Fluoroscopy provided for surgical guidance.  Please refer to the operative report.    Electronically signed by: Shayna Leong  Date:    07/12/2023  Time:    12:35       My Radiographs Findings:    No new radiographs today.  Assessment:       Encounter Diagnosis   Name Primary?    Closed fracture of neck of right femur with routine healing, subsequent encounter Yes         Plan:       I have discussed medical condition treatment options with her at length.  She states she has had a bit of drainage  There is no purulence that ice could Appreciate I removed her staples and placed Steri-Strips.  We discussed local wound care.  Daily dressing changes and follow-up with me in 3-4 weeks sooner if there is any questions or problems.  I will place her on 10 days of antibiotics as a  precaution.        Past Medical History:   Diagnosis Date    Arthritis     bilateral knees and hands    Asthma     Bipolar 1 disorder     Hiatal hernia     HLD (hyperlipidemia)     Migraine headache     Morbid obesity 2016    S/P gastric bypass 2016    by     Shoulder injury     left shoulder rotator cuff repair     Past Surgical History:   Procedure Laterality Date     SECTION      COLONOSCOPY N/A 2019    Procedure: COLONOSCOPY;  Surgeon: Dudley Araiza MD;  Location: [facility] ENDO;  Service: Endoscopy;  Laterality: N/A;    ESOPHAGOGASTRODUODENOSCOPY N/A 2019    Procedure: EGD (ESOPHAGOGASTRODUODENOSCOPY);  Surgeon: Dudley Araiza MD;  Location: [facility] ENDO;  Service: Endoscopy;  Laterality: N/A;    GASTRIC BYPASS  2016        HYSTERECTOMY      INTRALUMINAL GASTROINTESTINAL TRACT IMAGING VIA CAPSULE N/A 2019    Procedure: IMAGING PROCEDURE, GI TRACT, INTRALUMINAL, VIA CAPSULE;  Surgeon: Dudley Araiza MD;  Location: [facility] ENDO;  Service: Endoscopy;  Laterality: N/A;    INTRAMEDULLARY RODDING OF FEMUR Right 2023    Procedure: INSERTION, INTRAMEDULLARY TITI, FEMUR;  Surgeon: Andre Kennedy MD;  Location: [facility] OR;  Service: Orthopedics;  Laterality: Right;    JOINT REPLACEMENT      bilateral knees    ROTATOR CUFF REPAIR Left 2016    TOTAL KNEE ARTHROPLASTY Bilateral          Current Outpatient Medications:     albuterol (PROVENTIL) 2.5 mg /3 mL (0.083 %) nebulizer solution, Take 2.5 mg by nebulization every 6 (six) hours as needed for Wheezing or Shortness of Breath. , Disp: , Rfl:     ALBUTEROL SULFATE (VENTOLIN HFA INHL), Inhale 1 puff into the lungs once daily. , Disp: , Rfl:     amitriptyline (ELAVIL) 150 MG Tab, Take 150 mg by mouth nightly., Disp: , Rfl:     amoxicillin (AMOXIL) 500 MG capsule, Take 500 mg by mouth every 8 (eight) hours., Disp: , Rfl:     celecoxib (CELEBREX) 100 MG capsule, Take 100 mg by mouth once daily., Disp: ,  Rfl:     cyanocobalamin 1,000 mcg/mL injection, Inject 1,000 mcg into the muscle every 30 days. , Disp: , Rfl:     dexlansoprazole (DEXILANT) 60 mg capsule, Take 1 capsule (60 mg total) by mouth once daily., Disp: 90 capsule, Rfl: 3    dextroamphetamine-amphetamine (ADDERALL XR) 30 MG 24 hr capsule, Take 30 mg by mouth every morning. , Disp: , Rfl: 0    dextroamphetamine-amphetamine (ADDERALL) 20 mg tablet, Take 20 mg by mouth once daily. , Disp: , Rfl:     epinastine 0.05 % ophthalmic solution, Place 1 drop into both eyes 2 (two) times daily., Disp: , Rfl: 99    estradiol (ESTRACE) 1 MG tablet, Take 1 mg by mouth once daily., Disp: , Rfl: 11    fluticasone-salmeterol diskus inhaler 100-50 mcg, Inhale 1 puff into the lungs 2 (two) times daily., Disp: , Rfl:     gabapentin (NEURONTIN) 300 MG capsule, Take 300 mg by mouth 2 (two) times daily. , Disp: , Rfl:     HYDROcodone-acetaminophen (NORCO) 7.5-325 mg per tablet, Take 1-2 tablets by mouth every 4 (four) hours as needed for Pain., Disp: 20 tablet, Rfl: 0    lamotrigine (LAMICTAL) 150 MG Tab, Take 300 mg by mouth every evening. , Disp: , Rfl:     LIDOcaine (LIDODERM) 5 %, Place 1 patch onto the skin once daily. Remove & Discard patch within 12 hours or as directed by MD, Disp: 11 patch, Rfl: 0    multivitamin (THERAGRAN) per tablet, Take 1 tablet by mouth once daily., Disp: , Rfl:     olopatadine (PATADAY) 0.2 % Drop, Place 1 drop into both eyes once daily., Disp: , Rfl:     ondansetron (ZOFRAN-ODT) 4 MG TbDL, Take 1 tablet (4 mg total) by mouth every 6 (six) hours as needed., Disp: 30 tablet, Rfl: 3    pramipexole (MIRAPEX) 0.25 MG tablet, Take 0.25 mg by mouth 2 (two) times daily., Disp: , Rfl:     sertraline (ZOLOFT) 100 MG tablet, Take 150 mg by mouth every evening. , Disp: , Rfl:     simvastatin (ZOCOR) 20 MG tablet, Take 1 tablet (20 mg total) by mouth every evening., Disp: 90 tablet, Rfl: 3    sulindac (CLINORIL) 200 MG Tab, Take 200 mg by mouth 2 (two) times  daily. HCS, Disp: , Rfl:     sumatriptan (IMITREX STATDOSE) 6 mg/0.5 mL kit, Inject 6 mg into the skin every 2 (two) hours as needed for Migraine., Disp: , Rfl:     sumatriptan (IMITREX) 100 MG tablet, Take 100 mg by mouth every 2 (two) hours as needed for Migraine., Disp: , Rfl:     tiZANidine (ZANAFLEX) 4 MG tablet, Take 4 mg by mouth 2 (two) times a day. , Disp: , Rfl:     topiramate (TOPAMAX) 25 MG tablet, Take 1 tablet (25 mg total) by mouth 2 (two) times daily., Disp: 60 tablet, Rfl: 0    Review of patient's allergies indicates:   Allergen Reactions    Ace inhibitors Other (See Comments)     Cough    Morphine Other (See Comments)     headache    Compazine [prochlorperazine] Anxiety       Family History   Problem Relation Age of Onset    Diabetes Mother     Hyperlipidemia Mother     Hypertension Mother     Heart disease Father     Hypertension Brother     Breast cancer Sister      Social History     Occupational History    Not on file   Tobacco Use    Smoking status: Never    Smokeless tobacco: Never   Substance and Sexual Activity    Alcohol use: No    Drug use: No    Sexual activity: Not on file

## 2023-08-03 NOTE — DISCHARGE SUMMARY
Discharge diagnosis:  Right proximal femur fracture  Procedure:  Intramedullary nail  Discharge condition: Good  Discharge instructions patient was instructed to be partial weight-bearing with crutches or a walker to keep her incision clean and dry and follow up in 10 days postop sooner if there is any questions or problems.

## 2023-08-08 DIAGNOSIS — S72.001D CLOSED FRACTURE OF NECK OF RIGHT FEMUR WITH ROUTINE HEALING, SUBSEQUENT ENCOUNTER: Primary | ICD-10-CM

## 2023-08-17 ENCOUNTER — HOSPITAL ENCOUNTER (OUTPATIENT)
Dept: RADIOLOGY | Facility: HOSPITAL | Age: 53
Discharge: HOME | End: 2023-08-17
Attending: ORTHOPAEDIC SURGERY
Payer: MEDICARE

## 2023-08-17 ENCOUNTER — OFFICE VISIT (OUTPATIENT)
Dept: ORTHOPEDICS | Facility: CLINIC | Age: 53
End: 2023-08-17
Payer: MEDICARE

## 2023-08-17 VITALS — WEIGHT: 190 LBS | RESPIRATION RATE: 16 BRPM | BODY MASS INDEX: 31.65 KG/M2 | HEIGHT: 65 IN

## 2023-08-17 DIAGNOSIS — S72.001D CLOSED FRACTURE OF NECK OF RIGHT FEMUR WITH ROUTINE HEALING, SUBSEQUENT ENCOUNTER: Primary | ICD-10-CM

## 2023-08-17 DIAGNOSIS — S72.001D CLOSED FRACTURE OF NECK OF RIGHT FEMUR WITH ROUTINE HEALING, SUBSEQUENT ENCOUNTER: ICD-10-CM

## 2023-08-17 PROCEDURE — 73502 XR PELVIS 3 VIEW INC HIP 2 VIEW RIGHT: ICD-10-PCS | Mod: 26,RT,, | Performed by: RADIOLOGY

## 2023-08-17 PROCEDURE — 99024 PR POST-OP FOLLOW-UP VISIT: ICD-10-PCS | Mod: S$GLB,,, | Performed by: ORTHOPAEDIC SURGERY

## 2023-08-17 PROCEDURE — 1160F PR REVIEW ALL MEDS BY PRESCRIBER/CLIN PHARMACIST DOCUMENTED: ICD-10-PCS | Mod: CPTII,S$GLB,, | Performed by: ORTHOPAEDIC SURGERY

## 2023-08-17 PROCEDURE — 3008F BODY MASS INDEX DOCD: CPT | Mod: CPTII,S$GLB,, | Performed by: ORTHOPAEDIC SURGERY

## 2023-08-17 PROCEDURE — 73502 X-RAY EXAM HIP UNI 2-3 VIEWS: CPT | Mod: TC,PN,RT

## 2023-08-17 PROCEDURE — 73502 X-RAY EXAM HIP UNI 2-3 VIEWS: CPT | Mod: 26,RT,, | Performed by: RADIOLOGY

## 2023-08-17 PROCEDURE — 3008F PR BODY MASS INDEX (BMI) DOCUMENTED: ICD-10-PCS | Mod: CPTII,S$GLB,, | Performed by: ORTHOPAEDIC SURGERY

## 2023-08-17 PROCEDURE — 99999 PR PBB SHADOW E&M-EST. PATIENT-LVL IV: CPT | Mod: PBBFAC,,, | Performed by: ORTHOPAEDIC SURGERY

## 2023-08-17 PROCEDURE — 1159F PR MEDICATION LIST DOCUMENTED IN MEDICAL RECORD: ICD-10-PCS | Mod: CPTII,S$GLB,, | Performed by: ORTHOPAEDIC SURGERY

## 2023-08-17 PROCEDURE — 99999 PR PBB SHADOW E&M-EST. PATIENT-LVL IV: ICD-10-PCS | Mod: PBBFAC,,, | Performed by: ORTHOPAEDIC SURGERY

## 2023-08-17 PROCEDURE — 1160F RVW MEDS BY RX/DR IN RCRD: CPT | Mod: CPTII,S$GLB,, | Performed by: ORTHOPAEDIC SURGERY

## 2023-08-17 PROCEDURE — 1159F MED LIST DOCD IN RCRD: CPT | Mod: CPTII,S$GLB,, | Performed by: ORTHOPAEDIC SURGERY

## 2023-08-17 PROCEDURE — 99024 POSTOP FOLLOW-UP VISIT: CPT | Mod: S$GLB,,, | Performed by: ORTHOPAEDIC SURGERY

## 2023-08-17 NOTE — PROGRESS NOTES
Subjective:      Patient ID: Bessie Elliott is a 52 y.o. female.    Chief Complaint: Post-op Evaluation of the Right Hip    HPI  Patient follow up on her right hip denies any complaints of pain at this point  ROS      Objective:    Ortho Exam     She has a very minimally antalgic gait she has good hip range of motion      X-Ray Pelvis 3 view inc Hip 2 view Right  Narrative: EXAMINATION:  XR PELVIS 3 VIEW INC HIP 2 VIEW RIGHT    CLINICAL HISTORY:  Fracture of unspecified part of neck of right femur, subsequent encounter for closed fracture with routine healing    TECHNIQUE:  AP pelvis and two views of the right hip.    COMPARISON:  07/12/2023.    FINDINGS:  No acute fracture or dislocation.  No significant soft tissue swelling.    Status post recent ORIF of the proximal right femur with intramedullary herlinda and dynamic femoral neck screw.  Normal alignment.  No plain film evidence to suggest hardware failure.  Mild femoroacetabular degenerative osteoarthrosis.    Pubic symphysis is intact.  SI joints are intact.  Bilateral pubic rami are intact.  There is mild bone demineralization.  Stimulator device projects over the left iliac wing.  Impression: Status post recent ORIF of the proximal right femur without plain film evidence to suggest hardware failure.    Electronically signed by: Christo Guzman  Date:    08/17/2023  Time:    10:14       My Radiographs Findings:    I have personally reviewed radiographs and concur with above findings    Assessment:       Encounter Diagnosis   Name Primary?    Closed fracture of neck of right femur with routine healing, subsequent encounter Yes         Plan:       Patient without complaint status post intramedullary nail for an intertrochanteric hip fracture.  She may continue to slowly advance weight-bearing and activities as tolerated over the next 6 weeks.  We will follow up for repeat radiographs at that point I will see her sooner if any questions or problems.        Past  Medical History:   Diagnosis Date    Arthritis     bilateral knees and hands    Asthma     Bipolar 1 disorder     Hiatal hernia     HLD (hyperlipidemia)     Migraine headache     Morbid obesity 2016    S/P gastric bypass 2016    by     Shoulder injury     left shoulder rotator cuff repair     Past Surgical History:   Procedure Laterality Date     SECTION      COLONOSCOPY N/A 2019    Procedure: COLONOSCOPY;  Surgeon: Dudley Araiza MD;  Location: [facility] ENDO;  Service: Endoscopy;  Laterality: N/A;    ESOPHAGOGASTRODUODENOSCOPY N/A 2019    Procedure: EGD (ESOPHAGOGASTRODUODENOSCOPY);  Surgeon: Dudley Araiza MD;  Location: [facility] ENDO;  Service: Endoscopy;  Laterality: N/A;    GASTRIC BYPASS  2016        HYSTERECTOMY      INTRALUMINAL GASTROINTESTINAL TRACT IMAGING VIA CAPSULE N/A 2019    Procedure: IMAGING PROCEDURE, GI TRACT, INTRALUMINAL, VIA CAPSULE;  Surgeon: Dudley Araiza MD;  Location: [facility] ENDO;  Service: Endoscopy;  Laterality: N/A;    INTRAMEDULLARY RODDING OF FEMUR Right 2023    Procedure: INSERTION, INTRAMEDULLARY TITI, FEMUR;  Surgeon: Andre Kennedy MD;  Location: [facility] OR;  Service: Orthopedics;  Laterality: Right;    JOINT REPLACEMENT      bilateral knees    ROTATOR CUFF REPAIR Left 2016    TOTAL KNEE ARTHROPLASTY Bilateral 2015         Current Outpatient Medications:     albuterol (PROVENTIL) 2.5 mg /3 mL (0.083 %) nebulizer solution, Take 2.5 mg by nebulization every 6 (six) hours as needed for Wheezing or Shortness of Breath. , Disp: , Rfl:     ALBUTEROL SULFATE (VENTOLIN HFA INHL), Inhale 1 puff into the lungs once daily. , Disp: , Rfl:     amitriptyline (ELAVIL) 150 MG Tab, Take 150 mg by mouth nightly., Disp: , Rfl:     amoxicillin (AMOXIL) 500 MG capsule, Take 500 mg by mouth every 8 (eight) hours., Disp: , Rfl:     celecoxib (CELEBREX) 100 MG capsule, Take 100 mg by mouth once daily., Disp: , Rfl:     cyanocobalamin  1,000 mcg/mL injection, Inject 1,000 mcg into the muscle every 30 days. , Disp: , Rfl:     dextroamphetamine-amphetamine (ADDERALL XR) 30 MG 24 hr capsule, Take 30 mg by mouth every morning. , Disp: , Rfl: 0    dextroamphetamine-amphetamine (ADDERALL) 20 mg tablet, Take 20 mg by mouth once daily. , Disp: , Rfl:     epinastine 0.05 % ophthalmic solution, Place 1 drop into both eyes 2 (two) times daily., Disp: , Rfl: 99    estradiol (ESTRACE) 1 MG tablet, Take 1 mg by mouth once daily., Disp: , Rfl: 11    fluticasone-salmeterol diskus inhaler 100-50 mcg, Inhale 1 puff into the lungs 2 (two) times daily., Disp: , Rfl:     gabapentin (NEURONTIN) 300 MG capsule, Take 300 mg by mouth 2 (two) times daily. , Disp: , Rfl:     HYDROcodone-acetaminophen (NORCO) 7.5-325 mg per tablet, Take 1-2 tablets by mouth every 4 (four) hours as needed for Pain., Disp: 20 tablet, Rfl: 0    lamotrigine (LAMICTAL) 150 MG Tab, Take 300 mg by mouth every evening. , Disp: , Rfl:     LIDOcaine (LIDODERM) 5 %, Place 1 patch onto the skin once daily. Remove & Discard patch within 12 hours or as directed by MD, Disp: 11 patch, Rfl: 0    multivitamin (THERAGRAN) per tablet, Take 1 tablet by mouth once daily., Disp: , Rfl:     olopatadine (PATADAY) 0.2 % Drop, Place 1 drop into both eyes once daily., Disp: , Rfl:     ondansetron (ZOFRAN-ODT) 4 MG TbDL, Take 1 tablet (4 mg total) by mouth every 6 (six) hours as needed., Disp: 30 tablet, Rfl: 3    pramipexole (MIRAPEX) 0.25 MG tablet, Take 0.25 mg by mouth 2 (two) times daily., Disp: , Rfl:     sertraline (ZOLOFT) 100 MG tablet, Take 150 mg by mouth every evening. , Disp: , Rfl:     sulindac (CLINORIL) 200 MG Tab, Take 200 mg by mouth 2 (two) times daily. HCS, Disp: , Rfl:     sumatriptan (IMITREX STATDOSE) 6 mg/0.5 mL kit, Inject 6 mg into the skin every 2 (two) hours as needed for Migraine., Disp: , Rfl:     sumatriptan (IMITREX) 100 MG tablet, Take 100 mg by mouth every 2 (two) hours as needed for  Migraine., Disp: , Rfl:     tiZANidine (ZANAFLEX) 4 MG tablet, Take 4 mg by mouth 2 (two) times a day. , Disp: , Rfl:     dexlansoprazole (DEXILANT) 60 mg capsule, Take 1 capsule (60 mg total) by mouth once daily., Disp: 90 capsule, Rfl: 3    simvastatin (ZOCOR) 20 MG tablet, Take 1 tablet (20 mg total) by mouth every evening., Disp: 90 tablet, Rfl: 3    topiramate (TOPAMAX) 25 MG tablet, Take 1 tablet (25 mg total) by mouth 2 (two) times daily., Disp: 60 tablet, Rfl: 0    Review of patient's allergies indicates:   Allergen Reactions    Ace inhibitors Other (See Comments)     Cough    Morphine Other (See Comments)     headache    Compazine [prochlorperazine] Anxiety       Family History   Problem Relation Age of Onset    Diabetes Mother     Hyperlipidemia Mother     Hypertension Mother     Heart disease Father     Hypertension Brother     Breast cancer Sister      Social History     Occupational History    Not on file   Tobacco Use    Smoking status: Never    Smokeless tobacco: Never   Substance and Sexual Activity    Alcohol use: No    Drug use: No    Sexual activity: Not on file

## 2023-09-14 DIAGNOSIS — N95.9 UNSPECIFIED MENOPAUSAL AND PERIMENOPAUSAL DISORDER: Primary | ICD-10-CM

## 2023-09-20 ENCOUNTER — HOSPITAL ENCOUNTER (EMERGENCY)
Facility: HOSPITAL | Age: 53
Discharge: HOME | End: 2023-09-20
Attending: STUDENT IN AN ORGANIZED HEALTH CARE EDUCATION/TRAINING PROGRAM
Payer: MEDICARE

## 2023-09-20 VITALS
OXYGEN SATURATION: 99 % | RESPIRATION RATE: 16 BRPM | HEART RATE: 67 BPM | BODY MASS INDEX: 31.62 KG/M2 | WEIGHT: 190 LBS | DIASTOLIC BLOOD PRESSURE: 90 MMHG | TEMPERATURE: 98 F | SYSTOLIC BLOOD PRESSURE: 165 MMHG

## 2023-09-20 DIAGNOSIS — R07.9 CHEST PAIN: ICD-10-CM

## 2023-09-20 LAB
ALBUMIN SERPL BCP-MCNC: 3.7 G/DL (ref 3.5–5.2)
ALP SERPL-CCNC: 98 U/L (ref 55–135)
ALT SERPL W/O P-5'-P-CCNC: 7 U/L (ref 10–44)
ANION GAP SERPL CALC-SCNC: 8 MMOL/L (ref 8–16)
AST SERPL-CCNC: 13 U/L (ref 10–40)
BASOPHILS # BLD AUTO: 0.02 K/UL (ref 0–0.2)
BASOPHILS NFR BLD: 0.4 % (ref 0–1.9)
BILIRUB SERPL-MCNC: 0.1 MG/DL (ref 0.1–1)
BNP SERPL-MCNC: 114 PG/ML (ref 0–99)
BUN SERPL-MCNC: 11 MG/DL (ref 6–20)
CALCIUM SERPL-MCNC: 8.2 MG/DL (ref 8.7–10.5)
CHLORIDE SERPL-SCNC: 106 MMOL/L (ref 95–110)
CO2 SERPL-SCNC: 25 MMOL/L (ref 23–29)
CREAT SERPL-MCNC: 0.7 MG/DL (ref 0.5–1.4)
DIFFERENTIAL METHOD: ABNORMAL
EOSINOPHIL # BLD AUTO: 0 K/UL (ref 0–0.5)
EOSINOPHIL NFR BLD: 0.7 % (ref 0–8)
ERYTHROCYTE [DISTWIDTH] IN BLOOD BY AUTOMATED COUNT: 16.1 % (ref 11.5–14.5)
EST. GFR  (NO RACE VARIABLE): >60 ML/MIN/1.73 M^2
GLUCOSE SERPL-MCNC: 85 MG/DL (ref 70–110)
HCT VFR BLD AUTO: 34.1 % (ref 37–48.5)
HGB BLD-MCNC: 10.6 G/DL (ref 12–16)
IMM GRANULOCYTES # BLD AUTO: 0.01 K/UL (ref 0–0.04)
IMM GRANULOCYTES NFR BLD AUTO: 0.2 % (ref 0–0.5)
LYMPHOCYTES # BLD AUTO: 2 K/UL (ref 1–4.8)
LYMPHOCYTES NFR BLD: 44.2 % (ref 18–48)
MAGNESIUM SERPL-MCNC: 2.1 MG/DL (ref 1.6–2.6)
MCH RBC QN AUTO: 27.3 PG (ref 27–31)
MCHC RBC AUTO-ENTMCNC: 31.1 G/DL (ref 32–36)
MCV RBC AUTO: 88 FL (ref 82–98)
MONOCYTES # BLD AUTO: 0.3 K/UL (ref 0.3–1)
MONOCYTES NFR BLD: 7.2 % (ref 4–15)
NEUTROPHILS # BLD AUTO: 2.2 K/UL (ref 1.8–7.7)
NEUTROPHILS NFR BLD: 47.3 % (ref 38–73)
NRBC BLD-RTO: 0 /100 WBC
PLATELET # BLD AUTO: 266 K/UL (ref 150–450)
PMV BLD AUTO: 11 FL (ref 9.2–12.9)
POTASSIUM SERPL-SCNC: 3.7 MMOL/L (ref 3.5–5.1)
PROT SERPL-MCNC: 7.2 G/DL (ref 6–8.4)
RBC # BLD AUTO: 3.88 M/UL (ref 4–5.4)
SODIUM SERPL-SCNC: 139 MMOL/L (ref 136–145)
TROPONIN I SERPL HS-MCNC: 3.6 PG/ML (ref 0–14.9)
WBC # BLD AUTO: 4.57 K/UL (ref 3.9–12.7)

## 2023-09-20 PROCEDURE — 63600175 PHARM REV CODE 636 W HCPCS: Performed by: STUDENT IN AN ORGANIZED HEALTH CARE EDUCATION/TRAINING PROGRAM

## 2023-09-20 PROCEDURE — 85025 COMPLETE CBC W/AUTO DIFF WBC: CPT | Performed by: EMERGENCY MEDICINE

## 2023-09-20 PROCEDURE — 25000003 PHARM REV CODE 250: Performed by: STUDENT IN AN ORGANIZED HEALTH CARE EDUCATION/TRAINING PROGRAM

## 2023-09-20 PROCEDURE — 96374 THER/PROPH/DIAG INJ IV PUSH: CPT

## 2023-09-20 PROCEDURE — 83735 ASSAY OF MAGNESIUM: CPT | Performed by: EMERGENCY MEDICINE

## 2023-09-20 PROCEDURE — 83880 ASSAY OF NATRIURETIC PEPTIDE: CPT | Performed by: EMERGENCY MEDICINE

## 2023-09-20 PROCEDURE — 25500020 PHARM REV CODE 255: Performed by: EMERGENCY MEDICINE

## 2023-09-20 PROCEDURE — 84484 ASSAY OF TROPONIN QUANT: CPT | Performed by: EMERGENCY MEDICINE

## 2023-09-20 PROCEDURE — 99285 EMERGENCY DEPT VISIT HI MDM: CPT | Mod: 25

## 2023-09-20 PROCEDURE — 84484 ASSAY OF TROPONIN QUANT: CPT | Mod: 91 | Performed by: EMERGENCY MEDICINE

## 2023-09-20 PROCEDURE — 80053 COMPREHEN METABOLIC PANEL: CPT | Performed by: EMERGENCY MEDICINE

## 2023-09-20 RX ORDER — KETOROLAC TROMETHAMINE 30 MG/ML
15 INJECTION, SOLUTION INTRAMUSCULAR; INTRAVENOUS
Status: COMPLETED | OUTPATIENT
Start: 2023-09-20 | End: 2023-09-20

## 2023-09-20 RX ORDER — BUTALBITAL, ACETAMINOPHEN AND CAFFEINE 50; 325; 40 MG/1; MG/1; MG/1
1 TABLET ORAL ONCE
Status: COMPLETED | OUTPATIENT
Start: 2023-09-20 | End: 2023-09-20

## 2023-09-20 RX ADMIN — KETOROLAC TROMETHAMINE 15 MG: 30 INJECTION, SOLUTION INTRAMUSCULAR; INTRAVENOUS at 08:09

## 2023-09-20 RX ADMIN — BUTALBITAL, ACETAMINOPHEN AND CAFFEINE 1 TABLET: 325; 50; 40 TABLET ORAL at 08:09

## 2023-09-20 RX ADMIN — IOHEXOL 100 ML: 350 INJECTION, SOLUTION INTRAVENOUS at 03:09

## 2023-09-21 LAB — TROPONIN I SERPL HS-MCNC: 2.9 PG/ML (ref 0–14.9)

## 2023-09-21 NOTE — ED PROVIDER NOTES
"Encounter Date: 2023       History     Chief Complaint   Patient presents with    Chest Pain     "All night" with SOB. Denies cardiac hx. Pain worsens upon taking a deep breath.     53-year-old female presents for evaluation of left-sided parasternal chest pain, constant, worse with deep inspiration, starting yesterday, relieved overnight and then recurring again this morning, lasting throughout the day.  She denies shortness of breath.  She denies cough, fevers or chills.  The pain is nonexertional.  She has a history of GERD and gastric bypass.  She also has a history of migraines and has been struggling with a headache similar to prior migraines for the past 5 days.      Review of patient's allergies indicates:   Allergen Reactions    Ace inhibitors Other (See Comments)     Cough    Morphine Other (See Comments)     headache    Compazine [prochlorperazine] Anxiety     Past Medical History:   Diagnosis Date    Arthritis     bilateral knees and hands    Asthma     Bipolar 1 disorder     Hiatal hernia     HLD (hyperlipidemia)     Migraine headache     Morbid obesity 2016    S/P gastric bypass 2016    by     Shoulder injury     left shoulder rotator cuff repair     Past Surgical History:   Procedure Laterality Date     SECTION      COLONOSCOPY N/A 2019    Procedure: COLONOSCOPY;  Surgeon: Dudley Araiza MD;  Location: [facility] ENDO;  Service: Endoscopy;  Laterality: N/A;    ESOPHAGOGASTRODUODENOSCOPY N/A 2019    Procedure: EGD (ESOPHAGOGASTRODUODENOSCOPY);  Surgeon: Dudley Araiza MD;  Location: [facility] ENDO;  Service: Endoscopy;  Laterality: N/A;    GASTRIC BYPASS  2016        HYSTERECTOMY      INTRALUMINAL GASTROINTESTINAL TRACT IMAGING VIA CAPSULE N/A 2019    Procedure: IMAGING PROCEDURE, GI TRACT, INTRALUMINAL, VIA CAPSULE;  Surgeon: Dudley Araiza MD;  Location: [facility] ENDO;  Service: Endoscopy;  Laterality: N/A;    INTRAMEDULLARY RODDING OF FEMUR " Right 7/12/2023    Procedure: INSERTION, INTRAMEDULLARY TITI, FEMUR;  Surgeon: Andre Kennedy MD;  Location: [facility] OR;  Service: Orthopedics;  Laterality: Right;    JOINT REPLACEMENT      bilateral knees    ROTATOR CUFF REPAIR Left 2016    TOTAL KNEE ARTHROPLASTY Bilateral 2015     Family History   Problem Relation Age of Onset    Diabetes Mother     Hyperlipidemia Mother     Hypertension Mother     Heart disease Father     Hypertension Brother     Breast cancer Sister      Social History     Tobacco Use    Smoking status: Never    Smokeless tobacco: Never   Substance Use Topics    Alcohol use: No    Drug use: No     Review of Systems   Constitutional:  Negative for activity change, appetite change, chills, fever and unexpected weight change.   HENT:  Negative for dental problem and drooling.    Eyes:  Negative for discharge and itching.   Respiratory:  Negative for cough, chest tightness, shortness of breath, wheezing and stridor.    Cardiovascular:  Positive for chest pain. Negative for palpitations and leg swelling.   Gastrointestinal:  Negative for abdominal distention, abdominal pain, diarrhea and nausea.   Genitourinary:  Negative for difficulty urinating, dysuria, frequency and urgency.   Musculoskeletal:  Negative for back pain, gait problem and joint swelling.   Neurological:  Positive for headaches. Negative for dizziness, syncope and numbness.   Psychiatric/Behavioral:  Negative for agitation, behavioral problems and confusion.        Physical Exam     Initial Vitals [09/20/23 1227]   BP Pulse Resp Temp SpO2   (!) 186/92 71 17 98.2 °F (36.8 °C) 99 %      MAP       --         Physical Exam    Nursing note and vitals reviewed.  Constitutional: She appears well-developed and well-nourished. She is not diaphoretic.   HENT:   Head: Normocephalic and atraumatic.   Mouth/Throat: Oropharynx is clear and moist.   Eyes: EOM are normal. Pupils are equal, round, and reactive to light. Right eye exhibits no  discharge. Left eye exhibits no discharge.   Neck: No tracheal deviation present.   Normal range of motion.  Cardiovascular:  Normal rate, regular rhythm and intact distal pulses.           Pulmonary/Chest: No respiratory distress. She has no wheezes. She exhibits tenderness.   Left parasternal anterior chest wall tenderness to palpation   Abdominal: Abdomen is soft. She exhibits no distension. There is no abdominal tenderness.   Musculoskeletal:         General: No tenderness or edema. Normal range of motion.      Cervical back: Normal range of motion.     Neurological: She is alert and oriented to person, place, and time. She has normal strength. No cranial nerve deficit or sensory deficit. GCS eye subscore is 4. GCS verbal subscore is 5. GCS motor subscore is 6.   Skin: Skin is warm and dry. No rash noted.   Psychiatric: She has a normal mood and affect. Her behavior is normal. Thought content normal.         ED Course   Procedures  Labs Reviewed   CBC W/ AUTO DIFFERENTIAL - Abnormal; Notable for the following components:       Result Value    RBC 3.88 (*)     Hemoglobin 10.6 (*)     Hematocrit 34.1 (*)     MCHC 31.1 (*)     RDW 16.1 (*)     All other components within normal limits   COMPREHENSIVE METABOLIC PANEL - Abnormal; Notable for the following components:    Calcium 8.2 (*)     ALT 7 (*)     All other components within normal limits   B-TYPE NATRIURETIC PEPTIDE - Abnormal; Notable for the following components:     (*)     All other components within normal limits   MAGNESIUM   TROPONIN I HIGH SENSITIVITY   TROPONIN I HIGH SENSITIVITY          Imaging Results              CTA Chest Non-Coronary (PE Studies) (Final result)  Result time 09/20/23 16:10:54      Final result by Raza Asencio MD (09/20/23 16:10:54)                   Narrative:    EXAM:  CT Angiography Chest With Intravenous Contrast    CLINICAL HISTORY:  The patient is 53 years old and is Female; Pulmonary embolism (PE)  suspected, high prob    TECHNIQUE:  Axial computed tomographic angiography images of the chest with intravenous contrast.  Sagittal and coronal reformatted images were created and reviewed.  This CT exam was performed using one or more of the following dose reduction techniques:  automated exposure control, adjustment of the mA and/or kV according to patient size, and/or use of iterative reconstruction technique.  MIP reconstructed images were created and reviewed.    COMPARISON:  None.    FINDINGS:  PULMONARY ARTERIES:  Unremarkable.  No pulmonary emboli.  AORTA:  No acute findings.  No thoracic aortic aneurysm.  LUNGS AND PLEURAL SPACES:  Unremarkable.  No mass.  No consolidation.  No significant effusion.  No pneumothorax.  HEART:  Mild cardiomegaly.  No significant pericardial effusion.  No evidence of RV dysfunction.  THYROID:  Thyroid gland is unremarkable.  BONES/JOINTS:  No acute fracture.  No dislocation.  SOFT TISSUES:  Unremarkable.  LYMPH NODES:  Unremarkable.  No enlarged lymph nodes.  STOMACH AND BOWEL:  Postsurgical changes stomach and small bowel.    IMPRESSION:  No pulmonary emboli.    Electronically signed by:  Raza Asencio MD  9/20/2023 4:10 PM CDT Workstation: [id number]                                     X-Ray Chest PA And Lateral (Final result)  Result time 09/20/23 14:02:53   Procedure changed from X-Ray Chest AP Portable     Final result by Roshan Stevens MD (09/20/23 14:02:53)                   Narrative:    2 view chest    CLINICAL DATA: Chest pain    FINDINGS: PA and lateral views are compared to December 2020. Heart size is normal. The mediastinum is unremarkable. There are no infiltrates or effusions. No acute osseous abnormality is identified.    IMPRESSION:  1. No acute process.    Electronically signed by:  Roshan Stevens MD  9/20/2023 2:02 PM CDT Workstation: [id number]                                     Medications   iohexoL (OMNIPAQUE 350) injection 100 mL (100  mLs Intravenous Given 9/20/23 1558)   ketorolac injection 15 mg (15 mg Intravenous Given 9/20/23 2029)   butalbital-acetaminophen-caffeine -40 mg per tablet 1 tablet (1 tablet Oral Given 9/20/23 2031)     Medical Decision Making  Risk  Prescription drug management.                             53 year old patient with hx presents secondary to chest pain, ongoing for a day, worse with inspiration. Vitals normal. Patient well appearing and non-toxic on my exam.     Chest pain less likely to be ACS given EKG without acute ischemic changes, trop WNL x 2, lack of risk factors and history less concerning for ACS. Heart score is 1. , mildly elevated, patient is not volume overloaded on my exam, no pleural effusions on CXR, doubt CHF, will refer outpatient to cardiology for further workup. Chest xray without infiltrate, patient has no fever and no cough, lessening concern for bacterial pneumonia. Breath sounds equal, respirations unlabored, CXR w/o evidence of pneumothorax. Patient well appearing with normal vital signs, no muffled heart tones, doubt tamponade. No emesis, dyspaghia, no mediastinal air on CXR, doubt esophageal rupture. Doubt pulmonary embolus, given CT without evidence of pulmonary embolus. Doubt aortic dissection given patient is well appearing with equal pulses bilaterally, no abdominal pain, no peripheral pulse deficit, no new neurological deficit and no tearing chest pain to the back. Unclear etiology of chest pain at this time, but unlikely to be life threatening. No further workup warranted at this time.      Patient felt improved with interventions and is stable for discharge with outpatient follow-up. Return precautions given for new or worsening pain, exertional chest pain, worsening shortness of breath, or other new or worsening symptoms, patient understands and agrees with plan. All questions answered.  Instructed to follow up with PCP.     Clinical Impression:   Final  diagnoses:  [R07.9] Chest pain        ED Disposition Condition    Discharge Stable          ED Prescriptions    None       Follow-up Information       Follow up With Specialties Details Why Contact Info    Dru Bee MD Internal Medicine Call in 1 day To discuss recent ED visit [address] [place] [address] [phone]               Bartolome Chakraborty MD  09/21/23 1557

## 2023-09-21 NOTE — DISCHARGE INSTRUCTIONS

## 2023-10-16 DIAGNOSIS — S72.001D CLOSED FRACTURE OF NECK OF RIGHT FEMUR WITH ROUTINE HEALING, SUBSEQUENT ENCOUNTER: Primary | ICD-10-CM

## 2023-10-26 DIAGNOSIS — Z87.81 HISTORY OF HEALED TRAUMATIC FRACTURE: Primary | ICD-10-CM

## 2025-04-22 DIAGNOSIS — M79.671 RIGHT FOOT PAIN: Primary | ICD-10-CM

## 2025-04-24 ENCOUNTER — HOSPITAL ENCOUNTER (OUTPATIENT)
Dept: RADIOLOGY | Facility: HOSPITAL | Age: 55
Discharge: HOME | End: 2025-04-24
Attending: ORTHOPAEDIC SURGERY
Payer: MEDICARE

## 2025-04-24 ENCOUNTER — OFFICE VISIT (OUTPATIENT)
Dept: ORTHOPEDICS | Facility: CLINIC | Age: 55
End: 2025-04-24
Payer: MEDICARE

## 2025-04-24 VITALS — WEIGHT: 200 LBS | BODY MASS INDEX: 33.32 KG/M2 | HEIGHT: 65 IN

## 2025-04-24 DIAGNOSIS — S93.601A RIGHT FOOT SPRAIN, INITIAL ENCOUNTER: Primary | ICD-10-CM

## 2025-04-24 DIAGNOSIS — M79.671 RIGHT FOOT PAIN: ICD-10-CM

## 2025-04-24 PROCEDURE — 1160F RVW MEDS BY RX/DR IN RCRD: CPT | Mod: CPTII,S$GLB,, | Performed by: ORTHOPAEDIC SURGERY

## 2025-04-24 PROCEDURE — 1159F MED LIST DOCD IN RCRD: CPT | Mod: CPTII,S$GLB,, | Performed by: ORTHOPAEDIC SURGERY

## 2025-04-24 PROCEDURE — 99214 OFFICE O/P EST MOD 30 MIN: CPT | Mod: S$GLB,,, | Performed by: ORTHOPAEDIC SURGERY

## 2025-04-24 PROCEDURE — 73630 X-RAY EXAM OF FOOT: CPT | Mod: 26,RT,, | Performed by: RADIOLOGY

## 2025-04-24 PROCEDURE — 99999 PR PBB SHADOW E&M-EST. PATIENT-LVL IV: CPT | Mod: PBBFAC,,, | Performed by: ORTHOPAEDIC SURGERY

## 2025-04-24 PROCEDURE — 3008F BODY MASS INDEX DOCD: CPT | Mod: CPTII,S$GLB,, | Performed by: ORTHOPAEDIC SURGERY

## 2025-04-24 PROCEDURE — 73630 X-RAY EXAM OF FOOT: CPT | Mod: TC,PN,RT

## 2025-04-24 NOTE — PROGRESS NOTES
Subjective:      Patient ID: Bessie Elliott is a 54 y.o. female.    Chief Complaint: Pain and Injury of the Right Foot (DOI 4/6/2025)    HPI  The patient presents with a several week history of right foot pain.  States she feels like she twisted it.  She has had previous ligament damage in the ankle but felt like that she had recovered from that.  She has given it several weeks of rest with a persistent pain felt that she needed orthopedic evaluation.  ROS      Objective:    Ortho Exam     Constitutional:   Patient is alert  and oriented in no acute distress  HEENT:  normocephalic atraumatic; PERRL EOMI  Neck:  Supple without adenopathy  Cardiovascular:  Normal rate and rhythm  Pulmonary:  Normal respiratory effort normal chest wall expansion  Abdominal:  Nonprotuberant nondistended  Musculoskeletal:  Patient has a stable mildly antalgic gait  She has diffuse forefoot/midfoot tenderness  Adequate ankle range of motion good motor strength intact skin, sensation, and brisk capillary refill of the digit  No gross instability of the ankle.  Neurological:  No focal defect; cranial nerves 2-12 grossly intact  Psychiatric/behavioral:  Mood and behavior normal      X-Ray Foot Complete Right  EXAMINATION:  XR FOOT COMPLETE 3 VIEW RIGHT    CLINICAL HISTORY:  . Pain in right foot    TECHNIQUE:  AP, lateral, and oblique views of the right foot were performed.    COMPARISON:  None    FINDINGS:  Mild hallux valgus deformity.  Mild degenerative osteoarthrosis involving the IP joints of the digits.  Tarsal bones intact with normal tarsometatarsal alignment.  Small dorsal and plantar calcaneal spurs.  There is bone demineralization.    Electronically signed by: Christo Guzman  Date:    04/24/2025  Time:    09:23       My Radiographs Findings:    Radiographs without acute osseous abnormalities.  Assessment:       Encounter Diagnosis   Name Primary?    Foot sprain, left, initial encounter Yes         Plan:       I have discussed  medical condition treatment options her at length.  Due to her pain with weight-bearing we will place him into a walking boot I have discussed activity restrictions she would like some formal therapy we will set that up for her.  Follow up with me in 6 weeks if symptoms fail to improve may need to consider advanced imaging or referral to 1 of our foot and ankle specialists        Past Medical History:   Diagnosis Date    Arthritis     bilateral knees and hands    Asthma     Bipolar 1 disorder     Hiatal hernia     HLD (hyperlipidemia)     Migraine headache     Morbid obesity 2016    S/P gastric bypass 2016    by     Shoulder injury     left shoulder rotator cuff repair     Past Surgical History:   Procedure Laterality Date     SECTION      COLONOSCOPY N/A 2019    Procedure: COLONOSCOPY;  Surgeon: Dudley Araiza MD;  Location: [facility];  Service: Endoscopy;  Laterality: N/A;    ESOPHAGOGASTRODUODENOSCOPY N/A 2019    Procedure: EGD (ESOPHAGOGASTRODUODENOSCOPY);  Surgeon: Dudley Araiza MD;  Location: [facility] ENDO;  Service: Endoscopy;  Laterality: N/A;    GASTRIC BYPASS  2016        HYSTERECTOMY      INTRALUMINAL GASTROINTESTINAL TRACT IMAGING VIA CAPSULE N/A 2019    Procedure: IMAGING PROCEDURE, GI TRACT, INTRALUMINAL, VIA CAPSULE;  Surgeon: Dudley Araiza MD;  Location: [facility] ENDO;  Service: Endoscopy;  Laterality: N/A;    INTRAMEDULLARY RODDING OF FEMUR Right 2023    Procedure: INSERTION, INTRAMEDULLARY TITI, FEMUR;  Surgeon: Andre Kennedy MD;  Location: [facility] OR;  Service: Orthopedics;  Laterality: Right;    JOINT REPLACEMENT      bilateral knees    ROTATOR CUFF REPAIR Left 2016    TOTAL KNEE ARTHROPLASTY Bilateral 2015       Current Medications[1]    Review of patient's allergies indicates:   Allergen Reactions    Ace inhibitors Other (See Comments)     Cough    Morphine Other (See Comments)     headache    Compazine [prochlorperazine]  Anxiety       Family History   Problem Relation Name Age of Onset    Diabetes Mother      Hyperlipidemia Mother      Hypertension Mother      Heart disease Father      Hypertension Brother      Breast cancer Sister       Social History     Occupational History    Not on file   Tobacco Use    Smoking status: Never     Passive exposure: Never    Smokeless tobacco: Never   Substance and Sexual Activity    Alcohol use: No    Drug use: No    Sexual activity: Yes            [1]   Current Outpatient Medications:     albuterol (PROVENTIL) 2.5 mg /3 mL (0.083 %) nebulizer solution, Take 2.5 mg by nebulization every 6 (six) hours as needed for Wheezing or Shortness of Breath. , Disp: , Rfl:     ALBUTEROL SULFATE (VENTOLIN HFA INHL), Inhale 1 puff into the lungs once daily. , Disp: , Rfl:     amitriptyline (ELAVIL) 150 MG Tab, Take 150 mg by mouth nightly., Disp: , Rfl:     amoxicillin (AMOXIL) 500 MG capsule, Take 500 mg by mouth every 8 (eight) hours., Disp: , Rfl:     celecoxib (CELEBREX) 100 MG capsule, Take 100 mg by mouth once daily., Disp: , Rfl:     cyanocobalamin 1,000 mcg/mL injection, Inject 1,000 mcg into the muscle every 30 days. , Disp: , Rfl:     dexlansoprazole (DEXILANT) 60 mg capsule, Take 1 capsule (60 mg total) by mouth once daily., Disp: 90 capsule, Rfl: 3    dextroamphetamine-amphetamine (ADDERALL XR) 30 MG 24 hr capsule, Take 30 mg by mouth every morning. , Disp: , Rfl: 0    dextroamphetamine-amphetamine (ADDERALL) 20 mg tablet, Take 20 mg by mouth once daily. , Disp: , Rfl:     epinastine 0.05 % ophthalmic solution, Place 1 drop into both eyes 2 (two) times daily., Disp: , Rfl: 99    estradiol (ESTRACE) 1 MG tablet, Take 1 mg by mouth once daily., Disp: , Rfl: 11    fluticasone-salmeterol diskus inhaler 100-50 mcg, Inhale 1 puff into the lungs 2 (two) times daily., Disp: , Rfl:     gabapentin (NEURONTIN) 300 MG capsule, Take 300 mg by mouth 2 (two) times daily. , Disp: , Rfl:     lamotrigine (LAMICTAL)  150 MG Tab, Take 300 mg by mouth every evening. , Disp: , Rfl:     LIDOcaine (LIDODERM) 5 %, Place 1 patch onto the skin once daily. Remove & Discard patch within 12 hours or as directed by MD, Disp: 11 patch, Rfl: 0    multivitamin (THERAGRAN) per tablet, Take 1 tablet by mouth once daily., Disp: , Rfl:     olopatadine (PATADAY) 0.2 % Drop, Place 1 drop into both eyes once daily., Disp: , Rfl:     ondansetron (ZOFRAN-ODT) 4 MG TbDL, Take 1 tablet (4 mg total) by mouth every 6 (six) hours as needed., Disp: 30 tablet, Rfl: 3    pramipexole (MIRAPEX) 0.25 MG tablet, Take 0.25 mg by mouth 2 (two) times daily., Disp: , Rfl:     sertraline (ZOLOFT) 100 MG tablet, Take 150 mg by mouth every evening. , Disp: , Rfl:     simvastatin (ZOCOR) 20 MG tablet, Take 1 tablet (20 mg total) by mouth every evening., Disp: 90 tablet, Rfl: 3    sulindac (CLINORIL) 200 MG Tab, Take 200 mg by mouth 2 (two) times daily. HCS, Disp: , Rfl:     sumatriptan (IMITREX STATDOSE) 6 mg/0.5 mL kit, Inject 6 mg into the skin every 2 (two) hours as needed for Migraine., Disp: , Rfl:     sumatriptan (IMITREX) 100 MG tablet, Take 100 mg by mouth every 2 (two) hours as needed for Migraine., Disp: , Rfl:     tiZANidine (ZANAFLEX) 4 MG tablet, Take 4 mg by mouth 2 (two) times a day. , Disp: , Rfl:     topiramate (TOPAMAX) 25 MG tablet, Take 1 tablet (25 mg total) by mouth 2 (two) times daily., Disp: 60 tablet, Rfl: 0

## 2025-04-30 ENCOUNTER — TELEPHONE (OUTPATIENT)
Dept: ORTHOPEDICS | Facility: CLINIC | Age: 55
End: 2025-04-30
Payer: MEDICARE

## 2025-04-30 NOTE — TELEPHONE ENCOUNTER
----- Message from Santosh Bean sent at 4/30/2025  2:01 PM CDT -----  Contact: pt  Pt calling, want PT order to go to [facility] Call back  [phone]

## 2025-07-08 ENCOUNTER — TELEPHONE (OUTPATIENT)
Dept: ORTHOPEDICS | Facility: CLINIC | Age: 55
End: 2025-07-08
Payer: MEDICARE

## 2025-07-08 DIAGNOSIS — S93.601A RIGHT FOOT SPRAIN, INITIAL ENCOUNTER: Primary | ICD-10-CM

## 2025-07-08 NOTE — TELEPHONE ENCOUNTER
----- Message from Santosh Vick sent at 7/8/2025  8:39 AM CDT -----  Contact: patient  Pt needs new order for her PT at [facility].  This is for her right ankle/foot.    Call back number is [phone]

## 2025-07-24 ENCOUNTER — TELEPHONE (OUTPATIENT)
Dept: ORTHOPEDICS | Facility: CLINIC | Age: 55
End: 2025-07-24
Payer: MEDICARE

## 2025-07-24 NOTE — TELEPHONE ENCOUNTER
Called pt, RONALDOM advising she will need a clinic appointment prior to additional PT orders being placed.

## 2025-07-24 NOTE — TELEPHONE ENCOUNTER
----- Message from Hao sent at 7/24/2025  4:15 PM CDT -----  [facility] calling for the patient's updated PT orders    Fax [phone]    Phone [phone]

## (undated) DEVICE — STAPLER SKIN ROTATING HEAD

## (undated) DEVICE — Device

## (undated) DEVICE — SUT 2-0 VICRYL / CT-1

## (undated) DEVICE — DRESSING XEROFORM FOIL 4X4

## (undated) DEVICE — SUT VICRYL 2-0 CT-2 VCP269H

## (undated) DEVICE — DRILL BIT

## (undated) DEVICE — UNDERGLOVE BIOGEL PI SZ 6.5 LF

## (undated) DEVICE — SPONGE COTTON TRAY 4X4IN

## (undated) DEVICE — BNDG COFLEX FOAM LF2 ST 6X5YD

## (undated) DEVICE — GLOVE SURG ULTRA TOUCH 6

## (undated) DEVICE — GOWN POLY REINF BRTH SLV LG

## (undated) DEVICE — GLOVE BIOGEL PI ORTHO PRO SZ 8

## (undated) DEVICE — SUT 0 VICRYL / CT-1

## (undated) DEVICE — PAD ABDOMINAL STERILE 8X10IN

## (undated) DEVICE — DRAPE STERI U-SHAPED 47X51IN

## (undated) DEVICE — SUT CTD VICRYL 0 UND BR

## (undated) DEVICE — WIRE GUIDE 3.2MM 400MM
Type: IMPLANTABLE DEVICE | Site: FEMUR | Status: NON-FUNCTIONAL
Removed: 2023-07-12

## (undated) DEVICE — DRAPE IOBAN 2 STERI

## (undated) DEVICE — GLOVE SURG ULTRA TOUCH 7.5

## (undated) DEVICE — REAMER STEPPED DHS DCS

## (undated) DEVICE — TOWEL OR DISP STRL BLUE 4/PK